# Patient Record
Sex: FEMALE | Race: ASIAN | NOT HISPANIC OR LATINO
[De-identification: names, ages, dates, MRNs, and addresses within clinical notes are randomized per-mention and may not be internally consistent; named-entity substitution may affect disease eponyms.]

---

## 2019-09-23 ENCOUNTER — RESULT REVIEW (OUTPATIENT)
Age: 64
End: 2019-09-23

## 2019-11-14 ENCOUNTER — INPATIENT (INPATIENT)
Facility: HOSPITAL | Age: 64
LOS: 2 days | Discharge: HOME | End: 2019-11-17
Attending: INTERNAL MEDICINE | Admitting: INTERNAL MEDICINE
Payer: COMMERCIAL

## 2019-11-14 VITALS
TEMPERATURE: 98 F | RESPIRATION RATE: 16 BRPM | HEART RATE: 90 BPM | OXYGEN SATURATION: 100 % | DIASTOLIC BLOOD PRESSURE: 107 MMHG | SYSTOLIC BLOOD PRESSURE: 184 MMHG | WEIGHT: 154.32 LBS

## 2019-11-14 DIAGNOSIS — R07.9 CHEST PAIN, UNSPECIFIED: ICD-10-CM

## 2019-11-14 DIAGNOSIS — E11.69 TYPE 2 DIABETES MELLITUS WITH OTHER SPECIFIED COMPLICATION: ICD-10-CM

## 2019-11-14 DIAGNOSIS — I10 ESSENTIAL (PRIMARY) HYPERTENSION: ICD-10-CM

## 2019-11-14 DIAGNOSIS — I82.90 ACUTE EMBOLISM AND THROMBOSIS OF UNSPECIFIED VEIN: ICD-10-CM

## 2019-11-14 DIAGNOSIS — J45.909 UNSPECIFIED ASTHMA, UNCOMPLICATED: ICD-10-CM

## 2019-11-14 DIAGNOSIS — I21.3 ST ELEVATION (STEMI) MYOCARDIAL INFARCTION OF UNSPECIFIED SITE: ICD-10-CM

## 2019-11-14 DIAGNOSIS — Z90.49 ACQUIRED ABSENCE OF OTHER SPECIFIED PARTS OF DIGESTIVE TRACT: Chronic | ICD-10-CM

## 2019-11-14 DIAGNOSIS — E78.5 HYPERLIPIDEMIA, UNSPECIFIED: ICD-10-CM

## 2019-11-14 LAB
ALBUMIN SERPL ELPH-MCNC: 4.4 G/DL — SIGNIFICANT CHANGE UP (ref 3.3–5)
ALP SERPL-CCNC: 72 U/L — SIGNIFICANT CHANGE UP (ref 40–120)
ALT FLD-CCNC: 26 U/L — SIGNIFICANT CHANGE UP (ref 4–33)
ANION GAP SERPL CALC-SCNC: 19 MMO/L — HIGH (ref 7–14)
APTT BLD: 32.2 SEC — SIGNIFICANT CHANGE UP (ref 27.5–36.3)
AST SERPL-CCNC: 30 U/L — SIGNIFICANT CHANGE UP (ref 4–32)
BASOPHILS # BLD AUTO: 0.05 K/UL — SIGNIFICANT CHANGE UP (ref 0–0.2)
BASOPHILS NFR BLD AUTO: 0.4 % — SIGNIFICANT CHANGE UP (ref 0–2)
BILIRUB SERPL-MCNC: 0.5 MG/DL — SIGNIFICANT CHANGE UP (ref 0.2–1.2)
BUN SERPL-MCNC: 16 MG/DL — SIGNIFICANT CHANGE UP (ref 7–23)
CALCIUM SERPL-MCNC: 10.5 MG/DL — SIGNIFICANT CHANGE UP (ref 8.4–10.5)
CHLORIDE SERPL-SCNC: 95 MMOL/L — LOW (ref 98–107)
CK MB BLD-MCNC: 2.9 — HIGH (ref 0–2.5)
CK MB BLD-MCNC: 7.57 NG/ML — HIGH (ref 1–4.7)
CK SERPL-CCNC: 262 U/L — HIGH (ref 25–170)
CO2 SERPL-SCNC: 23 MMOL/L — SIGNIFICANT CHANGE UP (ref 22–31)
CREAT SERPL-MCNC: 0.66 MG/DL — SIGNIFICANT CHANGE UP (ref 0.5–1.3)
EOSINOPHIL # BLD AUTO: 0.24 K/UL — SIGNIFICANT CHANGE UP (ref 0–0.5)
EOSINOPHIL NFR BLD AUTO: 1.8 % — SIGNIFICANT CHANGE UP (ref 0–6)
GLUCOSE BLDC GLUCOMTR-MCNC: 136 MG/DL — HIGH (ref 70–99)
GLUCOSE SERPL-MCNC: 234 MG/DL — HIGH (ref 70–99)
HCT VFR BLD CALC: 49.4 % — HIGH (ref 34.5–45)
HGB BLD-MCNC: 16.8 G/DL — HIGH (ref 11.5–15.5)
IMM GRANULOCYTES NFR BLD AUTO: 0.3 % — SIGNIFICANT CHANGE UP (ref 0–1.5)
INR BLD: 0.97 — SIGNIFICANT CHANGE UP (ref 0.88–1.17)
LYMPHOCYTES # BLD AUTO: 18.2 % — SIGNIFICANT CHANGE UP (ref 13–44)
LYMPHOCYTES # BLD AUTO: 2.4 K/UL — SIGNIFICANT CHANGE UP (ref 1–3.3)
MAGNESIUM SERPL-MCNC: 1.2 MG/DL — LOW (ref 1.6–2.6)
MCHC RBC-ENTMCNC: 29.3 PG — SIGNIFICANT CHANGE UP (ref 27–34)
MCHC RBC-ENTMCNC: 34 % — SIGNIFICANT CHANGE UP (ref 32–36)
MCV RBC AUTO: 86.1 FL — SIGNIFICANT CHANGE UP (ref 80–100)
MONOCYTES # BLD AUTO: 1 K/UL — HIGH (ref 0–0.9)
MONOCYTES NFR BLD AUTO: 7.6 % — SIGNIFICANT CHANGE UP (ref 2–14)
NEUTROPHILS # BLD AUTO: 9.43 K/UL — HIGH (ref 1.8–7.4)
NEUTROPHILS NFR BLD AUTO: 71.7 % — SIGNIFICANT CHANGE UP (ref 43–77)
NRBC # FLD: 0 K/UL — SIGNIFICANT CHANGE UP (ref 0–0)
PHOSPHATE SERPL-MCNC: 3.8 MG/DL — SIGNIFICANT CHANGE UP (ref 2.5–4.5)
PLATELET # BLD AUTO: 290 K/UL — SIGNIFICANT CHANGE UP (ref 150–400)
PMV BLD: 11.9 FL — SIGNIFICANT CHANGE UP (ref 7–13)
POTASSIUM SERPL-MCNC: 3.7 MMOL/L — SIGNIFICANT CHANGE UP (ref 3.5–5.3)
POTASSIUM SERPL-SCNC: 3.7 MMOL/L — SIGNIFICANT CHANGE UP (ref 3.5–5.3)
PROT SERPL-MCNC: 8.2 G/DL — SIGNIFICANT CHANGE UP (ref 6–8.3)
PROTHROM AB SERPL-ACNC: 11.1 SEC — SIGNIFICANT CHANGE UP (ref 9.8–13.1)
RBC # BLD: 5.74 M/UL — HIGH (ref 3.8–5.2)
RBC # FLD: 13.5 % — SIGNIFICANT CHANGE UP (ref 10.3–14.5)
SODIUM SERPL-SCNC: 137 MMOL/L — SIGNIFICANT CHANGE UP (ref 135–145)
TROPONIN T, HIGH SENSITIVITY: 39 NG/L — SIGNIFICANT CHANGE UP (ref ?–14)
TSH SERPL-MCNC: 1.98 UIU/ML — SIGNIFICANT CHANGE UP (ref 0.27–4.2)
WBC # BLD: 13.16 K/UL — HIGH (ref 3.8–10.5)
WBC # FLD AUTO: 13.16 K/UL — HIGH (ref 3.8–10.5)

## 2019-11-14 PROCEDURE — 93010 ELECTROCARDIOGRAM REPORT: CPT

## 2019-11-14 RX ORDER — INSULIN LISPRO 100/ML
VIAL (ML) SUBCUTANEOUS
Refills: 0 | Status: DISCONTINUED | OUTPATIENT
Start: 2019-11-14 | End: 2019-11-17

## 2019-11-14 RX ORDER — DEXTROSE 50 % IN WATER 50 %
12.5 SYRINGE (ML) INTRAVENOUS ONCE
Refills: 0 | Status: DISCONTINUED | OUTPATIENT
Start: 2019-11-14 | End: 2019-11-17

## 2019-11-14 RX ORDER — SODIUM CHLORIDE 9 MG/ML
1000 INJECTION, SOLUTION INTRAVENOUS
Refills: 0 | Status: DISCONTINUED | OUTPATIENT
Start: 2019-11-14 | End: 2019-11-17

## 2019-11-14 RX ORDER — MAGNESIUM SULFATE 500 MG/ML
2 VIAL (ML) INJECTION ONCE
Refills: 0 | Status: COMPLETED | OUTPATIENT
Start: 2019-11-14 | End: 2019-11-14

## 2019-11-14 RX ORDER — HEPARIN SODIUM 5000 [USP'U]/ML
4000 INJECTION INTRAVENOUS; SUBCUTANEOUS EVERY 6 HOURS
Refills: 0 | Status: DISCONTINUED | OUTPATIENT
Start: 2019-11-14 | End: 2019-11-14

## 2019-11-14 RX ORDER — TICAGRELOR 90 MG/1
90 TABLET ORAL EVERY 12 HOURS
Refills: 0 | Status: DISCONTINUED | OUTPATIENT
Start: 2019-11-15 | End: 2019-11-17

## 2019-11-14 RX ORDER — DEXTROSE 50 % IN WATER 50 %
25 SYRINGE (ML) INTRAVENOUS ONCE
Refills: 0 | Status: DISCONTINUED | OUTPATIENT
Start: 2019-11-14 | End: 2019-11-17

## 2019-11-14 RX ORDER — ASPIRIN/CALCIUM CARB/MAGNESIUM 324 MG
324 TABLET ORAL ONCE
Refills: 0 | Status: DISCONTINUED | OUTPATIENT
Start: 2019-11-14 | End: 2019-11-14

## 2019-11-14 RX ORDER — INSULIN LISPRO 100/ML
VIAL (ML) SUBCUTANEOUS AT BEDTIME
Refills: 0 | Status: DISCONTINUED | OUTPATIENT
Start: 2019-11-14 | End: 2019-11-17

## 2019-11-14 RX ORDER — INSULIN GLARGINE 100 [IU]/ML
15 INJECTION, SOLUTION SUBCUTANEOUS AT BEDTIME
Refills: 0 | Status: DISCONTINUED | OUTPATIENT
Start: 2019-11-15 | End: 2019-11-17

## 2019-11-14 RX ORDER — HEPARIN SODIUM 5000 [USP'U]/ML
4000 INJECTION INTRAVENOUS; SUBCUTANEOUS ONCE
Refills: 0 | Status: COMPLETED | OUTPATIENT
Start: 2019-11-14 | End: 2019-11-14

## 2019-11-14 RX ORDER — TICAGRELOR 90 MG/1
180 TABLET ORAL ONCE
Refills: 0 | Status: COMPLETED | OUTPATIENT
Start: 2019-11-14 | End: 2019-11-14

## 2019-11-14 RX ORDER — ASPIRIN/CALCIUM CARB/MAGNESIUM 324 MG
81 TABLET ORAL DAILY
Refills: 0 | Status: DISCONTINUED | OUTPATIENT
Start: 2019-11-15 | End: 2019-11-17

## 2019-11-14 RX ORDER — ATORVASTATIN CALCIUM 80 MG/1
80 TABLET, FILM COATED ORAL AT BEDTIME
Refills: 0 | Status: DISCONTINUED | OUTPATIENT
Start: 2019-11-14 | End: 2019-11-17

## 2019-11-14 RX ORDER — POTASSIUM CHLORIDE 20 MEQ
40 PACKET (EA) ORAL ONCE
Refills: 0 | Status: COMPLETED | OUTPATIENT
Start: 2019-11-14 | End: 2019-11-14

## 2019-11-14 RX ORDER — GLUCAGON INJECTION, SOLUTION 0.5 MG/.1ML
1 INJECTION, SOLUTION SUBCUTANEOUS ONCE
Refills: 0 | Status: DISCONTINUED | OUTPATIENT
Start: 2019-11-14 | End: 2019-11-17

## 2019-11-14 RX ORDER — HEPARIN SODIUM 5000 [USP'U]/ML
INJECTION INTRAVENOUS; SUBCUTANEOUS
Qty: 25000 | Refills: 0 | Status: DISCONTINUED | OUTPATIENT
Start: 2019-11-14 | End: 2019-11-14

## 2019-11-14 RX ORDER — DEXTROSE 50 % IN WATER 50 %
15 SYRINGE (ML) INTRAVENOUS ONCE
Refills: 0 | Status: DISCONTINUED | OUTPATIENT
Start: 2019-11-14 | End: 2019-11-17

## 2019-11-14 RX ORDER — INSULIN GLARGINE 100 [IU]/ML
7 INJECTION, SOLUTION SUBCUTANEOUS ONCE
Refills: 0 | Status: COMPLETED | OUTPATIENT
Start: 2019-11-14 | End: 2019-11-14

## 2019-11-14 RX ORDER — CHLORHEXIDINE GLUCONATE 213 G/1000ML
1 SOLUTION TOPICAL
Refills: 0 | Status: DISCONTINUED | OUTPATIENT
Start: 2019-11-14 | End: 2019-11-17

## 2019-11-14 RX ORDER — ASPIRIN/CALCIUM CARB/MAGNESIUM 324 MG
162 TABLET ORAL ONCE
Refills: 0 | Status: COMPLETED | OUTPATIENT
Start: 2019-11-14 | End: 2019-11-14

## 2019-11-14 RX ORDER — NITROGLYCERIN 6.5 MG
0.4 CAPSULE, EXTENDED RELEASE ORAL ONCE
Refills: 0 | Status: COMPLETED | OUTPATIENT
Start: 2019-11-14 | End: 2019-11-14

## 2019-11-14 RX ADMIN — INSULIN GLARGINE 7 UNIT(S): 100 INJECTION, SOLUTION SUBCUTANEOUS at 22:51

## 2019-11-14 RX ADMIN — HEPARIN SODIUM 800 UNIT(S)/HR: 5000 INJECTION INTRAVENOUS; SUBCUTANEOUS at 19:08

## 2019-11-14 RX ADMIN — ATORVASTATIN CALCIUM 80 MILLIGRAM(S): 80 TABLET, FILM COATED ORAL at 22:12

## 2019-11-14 RX ADMIN — Medication 40 MILLIEQUIVALENT(S): at 22:12

## 2019-11-14 RX ADMIN — Medication 50 GRAM(S): at 22:12

## 2019-11-14 RX ADMIN — Medication 0.4 MILLIGRAM(S): at 18:11

## 2019-11-14 RX ADMIN — TICAGRELOR 180 MILLIGRAM(S): 90 TABLET ORAL at 19:06

## 2019-11-14 RX ADMIN — Medication 162 MILLIGRAM(S): at 18:20

## 2019-11-14 RX ADMIN — HEPARIN SODIUM 4000 UNIT(S): 5000 INJECTION INTRAVENOUS; SUBCUTANEOUS at 19:07

## 2019-11-14 NOTE — CONSULT NOTE ADULT - SUBJECTIVE AND OBJECTIVE BOX
HPI: 65 y/o F w/ PMH of HTN, HLD, IDDM2 c/o CP x 6 hours. Pt states that she was in her USOH when she was woken up from sleep w/ severe CP. CP is substernal and described as severe, 8/10 squeezing pain. No radiation. No associated dyspnea. No associated nausea. No diaphoresis. She attempted to wait out her sxs but somebody called 911 on her behalf. She denies any hx of CAD. On arrival, given ASA and nitro SL x 3; she reports she is w/o CP at time of my interview.    PMH: As above  Meds: lisinopril, amlodipine, metformin, insulin  All: NKDA  Fam: No hx of premature CAD    ROS: 10 organ ROS is unremarkable.    Phyx exam:  /107   Gen: NAD.  HEENT: NCAT  CV: S1, S2 RRR. No MRG.  Chest: CTAB. No WRR.  Abd: +BSx4. Soft. NTND.  Ext: No edema  Skin: No rash.    No labs  No imaging    ECG w/ SARA in inf leads, V3-V6. Possible R wave and ST depression in V1, V2.

## 2019-11-14 NOTE — H&P ADULT - ASSESSMENT
65 y/o F w/ PMH of HTN, HLD, IDDM2 c/o CP x 6 hours. Pt states that she was in her USOH when she was woken up from sleep w/ severe CP w/ ECG w/ STEMI criteria.

## 2019-11-14 NOTE — ED PROVIDER NOTE - OBJECTIVE STATEMENT
64 Y F with hx of asthma, DM, HTN, HLD, no hx of smoking, here with CP that started today while at rest. Pt says that she has never had this before, she denies prior cardiac hx. She says when the pain started she was very fixated on stress at her job. She denies nausea, vomiting, diaphoresis, LOC. 64 Y F with hx of asthma, DM, HTN, HLD, no hx of smoking, here with pressure like CP that started today while at rest at 12pm (6hrs ago). Pt says that she has never had this before, she denies prior cardiac hx. She says when the pain started she was very fixated on stress at her job. She denies nausea, vomiting, diaphoresis, LOC.  Pain has been constant.  EMS gave her 2 sprays NTG and also 162 ASA.  States pain is much improved after sprays, but still present.  Denies SOB though appears SOB. States she is at her baseline resp status and attributes mild SOB to asthma. No recent travel, known sick contacts. No orthopnea.  No recent travel, trauma, or surgeries.

## 2019-11-14 NOTE — ED ADULT NURSE NOTE - OBJECTIVE STATEMENT
Pt. c/o non-radiating cp. Began at 12PM while lying down. Denies n/v, sob, palpitations, dizziness or fevers. Given 162 asa and 2 sublingual nitro in field. PIV inserted, EKG concerning for stemi-Cardiology called. CATH activated. Medicated as ordered.

## 2019-11-14 NOTE — ED ADULT TRIAGE NOTE - CHIEF COMPLAINT QUOTE
Pt. c/o non-radiating cp and diarrhea starting today. Denies n/v, sob, palpitations, dizziness or fevers. Pmhx: HTN, DM Pt. c/o non-radiating cp and diarrhea starting today. Denies n/v, sob, palpitations, dizziness or fevers. Given 162 asa and 2 sublingual nitro.

## 2019-11-14 NOTE — H&P ADULT - PROBLEM SELECTOR PLAN 4
Patient has h/o hyperlipidemia and is on   Lipid panel in am  Continue statin therapy with lipitor 80mg Lipid panel in am  Continue statin therapy with lipitor 80mg

## 2019-11-14 NOTE — H&P ADULT - NEUROLOGICAL DETAILS
sensation intact/responds to pain/deep reflexes intact/alert and oriented x 3/responds to verbal commands

## 2019-11-14 NOTE — H&P ADULT - NSICDXPASTMEDICALHX_GEN_ALL_CORE_FT
PAST MEDICAL HISTORY:  Asthma     DM (diabetes mellitus)     HLD (hyperlipidemia)     HTN (hypertension)

## 2019-11-14 NOTE — H&P ADULT - PROBLEM SELECTOR PLAN 2
-Has h/o diabetes and is on lantus  -Hold metformin  -Check HbA1c  -Monitor blood sugars ac and hs  -Lispro insulin sliding scale  -Lantus 15u at bedtime  -Low carbohydrate diet  -Diabetes education  -Endocrine consult if needed  -Nutrition consult if needed

## 2019-11-14 NOTE — H&P ADULT - GASTROINTESTINAL DETAILS
no bruit/no masses palpable/bowel sounds normal/normal/nontender/no guarding/no rebound tenderness/soft/no distention/no rigidity

## 2019-11-14 NOTE — ED PROVIDER NOTE - CLINICAL SUMMARY MEDICAL DECISION MAKING FREE TEXT BOX
Pt p/w pressure like substernal chest pain x 6 hrs.  Constant, non radiating, no assoc sx.  h/o DM, HTN, HLD, Asthma.  No known cardiac hx.  BP very elevated in ED and chest pain somewhat improved c NTG.  2nd ECG c dynamic changes and concerning for STEMI by Sgarbossa. Will activate STEMI c/s, provide additional ASA, and reassess.

## 2019-11-14 NOTE — H&P ADULT - HISTORY OF PRESENT ILLNESS
This is a 64yoF w/ PMHx HTN, HLD, DMII presents with severe substernal chest pain with no associated symptoms.  Was given ASA and nitro sublingual with resolution of chest pain.  Upon arrival to ED, EKG concerning for STEMI criteria and was emergently taken the cardiac cath lab.  Found to have 99% OM1 and 60% Cx s/p 2 MAURO.  Admitted to CCU for further management.

## 2019-11-14 NOTE — CONSULT NOTE ADULT - ASSESSMENT
63 y/o F w/ PMH of HTN, HLD, IDDM2 c/o CP x 6 hours. Pt states that she was in her USOH when she was woken up from sleep w/ severe CP w/ ECG w/ STEMI criteria.    #STEMI  -S/p   -S/p ticagrelor  -C/w ASA/ticagrelor for maintenance  -S/p IV heparin; c/w gtt  -Start high intensity statin  -Check A1c, lipids  -Check TTE  -For emergent LHC now    #Pt will CCU bed after LHC  #Call w/ any Qs    Erlin Elliott MD  Cardiology Fellow  [phone]  All Cardiology service information can be found 24/7 on [email or web address], password: [email or web address]

## 2019-11-14 NOTE — H&P ADULT - PROBLEM SELECTOR PROBLEM 5
Asthma, unspecified asthma severity, unspecified whether complicated, unspecified whether persistent VTE (venous thromboembolism)

## 2019-11-14 NOTE — H&P ADULT - MUSCULOSKELETAL
details… detailed exam ROM intact/normal/no joint swelling/no joint erythema/no joint warmth/no calf tenderness/normal strength

## 2019-11-14 NOTE — H&P ADULT - PROBLEM SELECTOR PLAN 1
-S/p ASA 32, ticagrelor 180mg and heparin gtt load  -s/p emergent LHC  -C/w ASA/ticagrelor for maintenance  -Start lipitor 80mg  -Check A1c, lipids  -Check TTE -S/p ASA 325mg, ticagrelor 180mg and heparin gtt load  -s/p emergent LHC  -C/w ASA/ticagrelor for maintenance  -Start lipitor 80mg  -Check A1c, lipids  -Check TTE

## 2019-11-14 NOTE — H&P ADULT - RS GEN PE MLT RESP DETAILS PC
airway patent/normal/no rhonchi/breath sounds equal/respirations non-labored/good air movement/no chest wall tenderness/clear to auscultation bilaterally/no intercostal retractions/no wheezes/no subcutaneous emphysema/no rales

## 2019-11-14 NOTE — ED ADULT NURSE NOTE - CHIEF COMPLAINT QUOTE
Pt. c/o non-radiating cp and diarrhea starting today. Denies n/v, sob, palpitations, dizziness or fevers. Given 162 asa and 2 sublingual nitro.

## 2019-11-14 NOTE — H&P ADULT - NSHPLABSRESULTS_GEN_ALL_CORE
LABORATORY VALUES	 	                          16.8   13.16 )-----------( 290      ( 14 Nov 2019 18:00 )             49.4       11-14    137  |  95<L>  |  16  ----------------------------<  234<H>  3.7   |  23  |  0.66    Ca    10.5      14 Nov 2019 18:00  Phos  3.8     11-14  Mg     1.2     11-14    TPro  8.2  /  Alb  4.4  /  TBili  0.5  /  DBili  x   /  AST  30  /  ALT  26  /  AlkPhos  72  11-14    LIVER FUNCTIONS - ( 14 Nov 2019 18:00 )  Alb: 4.4 g/dL / Pro: 8.2 g/dL / ALK PHOS: 72 u/L / ALT: 26 u/L / AST: 30 u/L / GGT: x           Prothrombin Time, Plasma: 11.1 SEC (11-14 @ 18:00)      CARDIAC MARKERS:  Creatine Kinase, Serum: 262 u/L (11-14 @ 18:00)    CKMB: 7.57 ng/mL (11-14 @ 18:00)    CKMB Relative Index: 2.9 (11-14 @ 18:00)        Troponin T, High Sensitivity: 39 ng/L (11-14-19 @ 18:00)            Thyroid Stimulating Hormone, Serum: 1.98 uIU/mL (11-14 @ 18:00)        CAPILLARY BLOOD GLUCOSE      POCT Blood Glucose.: 136 mg/dL (14 Nov 2019 22:08) LABORATORY VALUES	 	                          16.8   13.16 )-----------( 290      ( 14 Nov 2019 18:00 )             49.4       11-14    137  |  95<L>  |  16  ----------------------------<  234<H>  3.7   |  23  |  0.66    Ca    10.5      14 Nov 2019 18:00  Phos  3.8     11-14  Mg     1.2     11-14    TPro  8.2  /  Alb  4.4  /  TBili  0.5  /  DBili  x   /  AST  30  /  ALT  26  /  AlkPhos  72  11-14    LIVER FUNCTIONS - ( 14 Nov 2019 18:00 )  Alb: 4.4 g/dL / Pro: 8.2 g/dL / ALK PHOS: 72 u/L / ALT: 26 u/L / AST: 30 u/L / GGT: x           Prothrombin Time, Plasma: 11.1 SEC (11-14 @ 18:00)      CARDIAC MARKERS:  Creatine Kinase, Serum: 262 u/L (11-14 @ 18:00)    CKMB: 7.57 ng/mL (11-14 @ 18:00)    CKMB Relative Index: 2.9 (11-14 @ 18:00)    Troponin T, High Sensitivity: 39 ng/L (11-14-19 @ 18:00)    Thyroid Stimulating Hormone, Serum: 1.98 uIU/mL (11-14 @ 18:00)      CAPILLARY BLOOD GLUCOSE  POCT Blood Glucose.: 136 mg/dL (14 Nov 2019 22:08)

## 2019-11-14 NOTE — H&P ADULT - PROBLEM SELECTOR PLAN 3
Patient with h/o hypertension and is on   Continue home medications on enalapril and amlodipine  DASH diet  Monitor blood pressure  would hold ACE at this time given recent dye load  dc norvas  would start patient on metoprolol 12.5mg BID

## 2019-11-14 NOTE — ED PROVIDER NOTE - ATTENDING CONTRIBUTION TO CARE
Attending Attestation: Dr. White  I have personally performed a history and physical examination of the patient and discussed management with the resident as well as the patient.  I reviewed the resident's note and agree with the documented findings and plan of care.  I have authored and modified critical sections of the Provider Note, including but not limited to HPI, Physical Exam and MDM. Pt p/w pressure like substernal chest pain x 6 hrs.  Constant, non radiating, no assoc sx.  h/o DM, HTN, HLD, Asthma.  No known cardiac hx.  BP very elevated in ED and chest pain somewhat improved c NTG.  2nd ECG c dynamic changes and concerning for STEMI by Sgarbossa. Will activate STEMI c/s, provide additional ASA, and reassess.

## 2019-11-15 ENCOUNTER — TRANSCRIPTION ENCOUNTER (OUTPATIENT)
Age: 64
End: 2019-11-15

## 2019-11-15 DIAGNOSIS — Z29.9 ENCOUNTER FOR PROPHYLACTIC MEASURES, UNSPECIFIED: ICD-10-CM

## 2019-11-15 LAB
ALBUMIN SERPL ELPH-MCNC: 3.6 G/DL — SIGNIFICANT CHANGE UP (ref 3.3–5)
ALBUMIN SERPL ELPH-MCNC: 3.7 G/DL — SIGNIFICANT CHANGE UP (ref 3.3–5)
ALP SERPL-CCNC: 58 U/L — SIGNIFICANT CHANGE UP (ref 40–120)
ALP SERPL-CCNC: 59 U/L — SIGNIFICANT CHANGE UP (ref 40–120)
ALT FLD-CCNC: 37 U/L — HIGH (ref 4–33)
ALT FLD-CCNC: 48 U/L — HIGH (ref 4–33)
ANION GAP SERPL CALC-SCNC: 12 MMO/L — SIGNIFICANT CHANGE UP (ref 7–14)
ANION GAP SERPL CALC-SCNC: 15 MMO/L — HIGH (ref 7–14)
AST SERPL-CCNC: 177 U/L — HIGH (ref 4–32)
AST SERPL-CCNC: 288 U/L — HIGH (ref 4–32)
BILIRUB SERPL-MCNC: 0.6 MG/DL — SIGNIFICANT CHANGE UP (ref 0.2–1.2)
BILIRUB SERPL-MCNC: 0.8 MG/DL — SIGNIFICANT CHANGE UP (ref 0.2–1.2)
BUN SERPL-MCNC: 15 MG/DL — SIGNIFICANT CHANGE UP (ref 7–23)
BUN SERPL-MCNC: 16 MG/DL — SIGNIFICANT CHANGE UP (ref 7–23)
CALCIUM SERPL-MCNC: 9.5 MG/DL — SIGNIFICANT CHANGE UP (ref 8.4–10.5)
CALCIUM SERPL-MCNC: 9.5 MG/DL — SIGNIFICANT CHANGE UP (ref 8.4–10.5)
CHLORIDE SERPL-SCNC: 97 MMOL/L — LOW (ref 98–107)
CHLORIDE SERPL-SCNC: 99 MMOL/L — SIGNIFICANT CHANGE UP (ref 98–107)
CHOLEST SERPL-MCNC: 101 MG/DL — LOW (ref 120–199)
CHOLEST SERPL-MCNC: 105 MG/DL — LOW (ref 120–199)
CK MB BLD-MCNC: 13.3 — HIGH (ref 0–2.5)
CK MB BLD-MCNC: 148.5 NG/ML — HIGH (ref 1–4.7)
CK MB BLD-MCNC: 309.8 NG/ML — HIGH (ref 1–4.7)
CK MB BLD-MCNC: 353.2 NG/ML — HIGH (ref 1–4.7)
CK MB BLD-MCNC: 8.7 — HIGH (ref 0–2.5)
CK SERPL-CCNC: 1705 U/L — HIGH (ref 25–170)
CK SERPL-CCNC: 2086 U/L — HIGH (ref 25–170)
CK SERPL-CCNC: 2648 U/L — HIGH (ref 25–170)
CO2 SERPL-SCNC: 22 MMOL/L — SIGNIFICANT CHANGE UP (ref 22–31)
CO2 SERPL-SCNC: 26 MMOL/L — SIGNIFICANT CHANGE UP (ref 22–31)
CREAT SERPL-MCNC: 0.75 MG/DL — SIGNIFICANT CHANGE UP (ref 0.5–1.3)
CREAT SERPL-MCNC: 0.78 MG/DL — SIGNIFICANT CHANGE UP (ref 0.5–1.3)
GLUCOSE BLDC GLUCOMTR-MCNC: 126 MG/DL — HIGH (ref 70–99)
GLUCOSE BLDC GLUCOMTR-MCNC: 140 MG/DL — HIGH (ref 70–99)
GLUCOSE BLDC GLUCOMTR-MCNC: 143 MG/DL — HIGH (ref 70–99)
GLUCOSE BLDC GLUCOMTR-MCNC: 173 MG/DL — HIGH (ref 70–99)
GLUCOSE SERPL-MCNC: 148 MG/DL — HIGH (ref 70–99)
GLUCOSE SERPL-MCNC: 179 MG/DL — HIGH (ref 70–99)
HBA1C BLD-MCNC: 8.6 % — HIGH (ref 4–5.6)
HBA1C BLD-MCNC: 8.8 % — HIGH (ref 4–5.6)
HCT VFR BLD CALC: 44.4 % — SIGNIFICANT CHANGE UP (ref 34.5–45)
HCT VFR BLD CALC: 45.2 % — HIGH (ref 34.5–45)
HCV AB S/CO SERPL IA: 0.14 S/CO — SIGNIFICANT CHANGE UP (ref 0–0.99)
HCV AB SERPL-IMP: SIGNIFICANT CHANGE UP
HDLC SERPL-MCNC: 32 MG/DL — LOW (ref 45–65)
HDLC SERPL-MCNC: 34 MG/DL — LOW (ref 45–65)
HGB BLD-MCNC: 14.8 G/DL — SIGNIFICANT CHANGE UP (ref 11.5–15.5)
HGB BLD-MCNC: 15.1 G/DL — SIGNIFICANT CHANGE UP (ref 11.5–15.5)
LIPID PNL WITH DIRECT LDL SERPL: 55 MG/DL — SIGNIFICANT CHANGE UP
LIPID PNL WITH DIRECT LDL SERPL: 56 MG/DL — SIGNIFICANT CHANGE UP
MAGNESIUM SERPL-MCNC: 1.8 MG/DL — SIGNIFICANT CHANGE UP (ref 1.6–2.6)
MAGNESIUM SERPL-MCNC: 2.1 MG/DL — SIGNIFICANT CHANGE UP (ref 1.6–2.6)
MCHC RBC-ENTMCNC: 28.3 PG — SIGNIFICANT CHANGE UP (ref 27–34)
MCHC RBC-ENTMCNC: 28.3 PG — SIGNIFICANT CHANGE UP (ref 27–34)
MCHC RBC-ENTMCNC: 33.3 % — SIGNIFICANT CHANGE UP (ref 32–36)
MCHC RBC-ENTMCNC: 33.4 % — SIGNIFICANT CHANGE UP (ref 32–36)
MCV RBC AUTO: 84.8 FL — SIGNIFICANT CHANGE UP (ref 80–100)
MCV RBC AUTO: 84.9 FL — SIGNIFICANT CHANGE UP (ref 80–100)
NRBC # FLD: 0 K/UL — SIGNIFICANT CHANGE UP (ref 0–0)
NRBC # FLD: 0 K/UL — SIGNIFICANT CHANGE UP (ref 0–0)
PHOSPHATE SERPL-MCNC: 4 MG/DL — SIGNIFICANT CHANGE UP (ref 2.5–4.5)
PHOSPHATE SERPL-MCNC: 4.1 MG/DL — SIGNIFICANT CHANGE UP (ref 2.5–4.5)
PLATELET # BLD AUTO: 264 K/UL — SIGNIFICANT CHANGE UP (ref 150–400)
PLATELET # BLD AUTO: 265 K/UL — SIGNIFICANT CHANGE UP (ref 150–400)
PMV BLD: 11.6 FL — SIGNIFICANT CHANGE UP (ref 7–13)
PMV BLD: 11.8 FL — SIGNIFICANT CHANGE UP (ref 7–13)
POTASSIUM SERPL-MCNC: 3.5 MMOL/L — SIGNIFICANT CHANGE UP (ref 3.5–5.3)
POTASSIUM SERPL-MCNC: 4.3 MMOL/L — SIGNIFICANT CHANGE UP (ref 3.5–5.3)
POTASSIUM SERPL-SCNC: 3.5 MMOL/L — SIGNIFICANT CHANGE UP (ref 3.5–5.3)
POTASSIUM SERPL-SCNC: 4.3 MMOL/L — SIGNIFICANT CHANGE UP (ref 3.5–5.3)
PROT SERPL-MCNC: 6.8 G/DL — SIGNIFICANT CHANGE UP (ref 6–8.3)
PROT SERPL-MCNC: 7 G/DL — SIGNIFICANT CHANGE UP (ref 6–8.3)
RBC # BLD: 5.23 M/UL — HIGH (ref 3.8–5.2)
RBC # BLD: 5.33 M/UL — HIGH (ref 3.8–5.2)
RBC # FLD: 13.3 % — SIGNIFICANT CHANGE UP (ref 10.3–14.5)
RBC # FLD: 13.6 % — SIGNIFICANT CHANGE UP (ref 10.3–14.5)
SODIUM SERPL-SCNC: 134 MMOL/L — LOW (ref 135–145)
SODIUM SERPL-SCNC: 137 MMOL/L — SIGNIFICANT CHANGE UP (ref 135–145)
TRIGL SERPL-MCNC: 157 MG/DL — HIGH (ref 10–149)
TRIGL SERPL-MCNC: 97 MG/DL — SIGNIFICANT CHANGE UP (ref 10–149)
TROPONIN T, HIGH SENSITIVITY: 3299 NG/L — CRITICAL HIGH (ref ?–14)
TROPONIN T, HIGH SENSITIVITY: 6901 NG/L — CRITICAL HIGH (ref ?–14)
TSH SERPL-MCNC: 1.48 UIU/ML — SIGNIFICANT CHANGE UP (ref 0.27–4.2)
TSH SERPL-MCNC: 1.82 UIU/ML — SIGNIFICANT CHANGE UP (ref 0.27–4.2)
WBC # BLD: 10.85 K/UL — HIGH (ref 3.8–10.5)
WBC # BLD: 12.88 K/UL — HIGH (ref 3.8–10.5)
WBC # FLD AUTO: 10.85 K/UL — HIGH (ref 3.8–10.5)
WBC # FLD AUTO: 12.88 K/UL — HIGH (ref 3.8–10.5)

## 2019-11-15 PROCEDURE — 93306 TTE W/DOPPLER COMPLETE: CPT | Mod: 26

## 2019-11-15 RX ORDER — HEPARIN SODIUM 5000 [USP'U]/ML
5000 INJECTION INTRAVENOUS; SUBCUTANEOUS EVERY 12 HOURS
Refills: 0 | Status: DISCONTINUED | OUTPATIENT
Start: 2019-11-15 | End: 2019-11-17

## 2019-11-15 RX ORDER — POTASSIUM CHLORIDE 20 MEQ
40 PACKET (EA) ORAL ONCE
Refills: 0 | Status: COMPLETED | OUTPATIENT
Start: 2019-11-15 | End: 2019-11-15

## 2019-11-15 RX ORDER — MAGNESIUM SULFATE 500 MG/ML
2 VIAL (ML) INJECTION ONCE
Refills: 0 | Status: COMPLETED | OUTPATIENT
Start: 2019-11-15 | End: 2019-11-15

## 2019-11-15 RX ORDER — PANTOPRAZOLE SODIUM 20 MG/1
40 TABLET, DELAYED RELEASE ORAL
Refills: 0 | Status: DISCONTINUED | OUTPATIENT
Start: 2019-11-15 | End: 2019-11-17

## 2019-11-15 RX ORDER — FUROSEMIDE 40 MG
40 TABLET ORAL ONCE
Refills: 0 | Status: COMPLETED | OUTPATIENT
Start: 2019-11-15 | End: 2019-11-15

## 2019-11-15 RX ORDER — HYDROCHLOROTHIAZIDE 25 MG
25 TABLET ORAL DAILY
Refills: 0 | Status: DISCONTINUED | OUTPATIENT
Start: 2019-11-15 | End: 2019-11-17

## 2019-11-15 RX ORDER — MONTELUKAST 4 MG/1
10 TABLET, CHEWABLE ORAL DAILY
Refills: 0 | Status: DISCONTINUED | OUTPATIENT
Start: 2019-11-15 | End: 2019-11-17

## 2019-11-15 RX ORDER — LISINOPRIL 2.5 MG/1
5 TABLET ORAL DAILY
Refills: 0 | Status: DISCONTINUED | OUTPATIENT
Start: 2019-11-15 | End: 2019-11-17

## 2019-11-15 RX ADMIN — HEPARIN SODIUM 5000 UNIT(S): 5000 INJECTION INTRAVENOUS; SUBCUTANEOUS at 05:49

## 2019-11-15 RX ADMIN — Medication 40 MILLIEQUIVALENT(S): at 02:15

## 2019-11-15 RX ADMIN — MONTELUKAST 10 MILLIGRAM(S): 4 TABLET, CHEWABLE ORAL at 22:01

## 2019-11-15 RX ADMIN — CHLORHEXIDINE GLUCONATE 1 APPLICATION(S): 213 SOLUTION TOPICAL at 12:34

## 2019-11-15 RX ADMIN — Medication 50 GRAM(S): at 02:15

## 2019-11-15 RX ADMIN — ATORVASTATIN CALCIUM 80 MILLIGRAM(S): 80 TABLET, FILM COATED ORAL at 22:01

## 2019-11-15 RX ADMIN — PANTOPRAZOLE SODIUM 40 MILLIGRAM(S): 20 TABLET, DELAYED RELEASE ORAL at 05:49

## 2019-11-15 RX ADMIN — INSULIN GLARGINE 15 UNIT(S): 100 INJECTION, SOLUTION SUBCUTANEOUS at 22:01

## 2019-11-15 RX ADMIN — Medication 1: at 16:53

## 2019-11-15 RX ADMIN — TICAGRELOR 90 MILLIGRAM(S): 90 TABLET ORAL at 05:49

## 2019-11-15 RX ADMIN — HEPARIN SODIUM 5000 UNIT(S): 5000 INJECTION INTRAVENOUS; SUBCUTANEOUS at 17:04

## 2019-11-15 RX ADMIN — Medication 81 MILLIGRAM(S): at 12:34

## 2019-11-15 RX ADMIN — Medication 40 MILLIGRAM(S): at 05:52

## 2019-11-15 RX ADMIN — TICAGRELOR 90 MILLIGRAM(S): 90 TABLET ORAL at 17:04

## 2019-11-15 NOTE — DISCHARGE NOTE PROVIDER - NSDCFUADDINST_GEN_ALL_CORE_FT
Follow up with your PCP & cardiology within 1-2 weeks; call for appointments.    Monitor cardiac catheterization site for signs of bleeding, increased bruising, swelling, or discharge. If you experience any of these symptoms, please follow up with your primary care physician or return to the hospital immediately. Do not submerge the site in water (bathe or swim). You may shower. No strenuous activity for 3 weeks. Do not drive for 48hrs following angiogram.

## 2019-11-15 NOTE — DISCHARGE NOTE PROVIDER - PROVIDER TOKENS
PROVIDER:[TOKEN:[[id number]]] PROVIDER:[TOKEN:[[id number]]],FREE:[LAST:[Correa],PHONE:[(   )    -],FAX:[(   )    -],ADDRESS:[PCP]]

## 2019-11-15 NOTE — DISCHARGE NOTE PROVIDER - NSDCACTIVITY_GEN_ALL_CORE
Walking - Outdoors allowed/Showering allowed/Stairs allowed/Walking - Indoors allowed/No restrictions/No heavy lifting/straining

## 2019-11-15 NOTE — CHART NOTE - NSCHARTNOTEFT_GEN_A_CORE
63yo F with PMH of HTN, HLD, asthma, DM2 presented with chest tightness 2/2 inferiorlateral STEMI. Patient woke up in the evening with chest tightness and R hand pain. No previous hx of MI, CVA, HF. EKG demonstrated ST elevation in II, III, avF, V3-V6. Cath showed pLCx 60% stentx1, pOM1 100% (MAURO) with EF 35% and EDP 25. Patient was started on ASA, Brilinta, and high intensity statin.     ICU Vital Signs Last 24 Hrs  T(C): 37.2 (15 Nov 2019 16:17), Max: 37.2 (15 Nov 2019 16:17)  T(F): 98.9 (15 Nov 2019 16:17), Max: 98.9 (15 Nov 2019 16:17)  HR: 94 (15 Nov 2019 17:00) (71 - 100)  BP: 141/80 (15 Nov 2019 17:00) (108/79 - 230/118)  BP(mean): 95 (15 Nov 2019 17:00) (73 - 136)  ABP: --  ABP(mean): --  RR: 15 (15 Nov 2019 17:00) (15 - 23)  SpO2: 99% (15 Nov 2019 17:00) (97% - 100%)    PHYSICAL EXAM  GENERAL: NAD, lying comfortably in bed   HEAD:  Atraumatic, Normocephalic  EYES: EOMI b/l, PERRLA b/l, conjunctiva and sclera clear  NECK: Supple, No JVD, No LAD   CHEST/LUNG: Clear to auscultation bilaterally; No wheeze or ronchi  HEART: Regular rate and rhythm; S1 and S2 present, No murmurs, rubs, or gallops  ABDOMEN: Soft, Nontender, Nondistended; Bowel sounds present  EXTREMITIES:  2+ Peripheral Pulses, No clubbing, cyanosis, or edema. R groin dressing CDI, no hematoma  NEURO: AAOx3, non-focal   SKIN: No rashes or lesions    63yo F with PMH of HTN, HLD, asthma, DM2 presented with chest tightness 2/2 inferiorlateral STEMI s/p L. cath pLCx 60% stentx1, pOM1 100% (MAURO) with EF 35% and EDP 25, on ASA, Brilinta, and atorvastatin.       # ST elevation myocardial infarction (STEMI), unspecified artery.   s/p L. cath pLCx 60% stentx1, pOM1 100% (MAURO) with EF 35% and EDP 25  cw asa, brinlita, atorvastatin  start metoprolol tomorrow 12.5mg BID  Echo: 40% EF with moderate LV systolic dysfunction. Inferior and vasal to mid inferolateral wall hypokinesis   trend cardiac enzymes.     #Type 2 diabetes mellitus with other specified complication, with long-term current use of insulin.   SSI and FS  A1c 8.6.     #Prophylactic measure.  Plan: DVT: Heparin SubQ  Diet: DASH.     OUTSTANDING ISSUES  [] start metoprolol 12.5mg BID tomorrow  [] confirm home insulin dose  [] may increase lisinopril if HTN  [] trend cardiac enzymes until downtrending q8  [] needs follow up with cardiologist 65yo F with PMH of HTN, HLD, asthma, DM2 presented with chest tightness 2/2 inferiorlateral STEMI. Patient woke up in the evening with chest tightness and R hand pain. No previous hx of MI, CVA, HF. EKG demonstrated ST elevation in II, III, avF, V3-V6. Cath showed pLCx 60% stentx1, pOM1 100% (MAURO) with EF 35% and EDP 25. Patient was started on ASA, Brilinta, and high intensity statin.     ICU Vital Signs Last 24 Hrs  T(C): 37.2 (15 Nov 2019 16:17), Max: 37.2 (15 Nov 2019 16:17)  T(F): 98.9 (15 Nov 2019 16:17), Max: 98.9 (15 Nov 2019 16:17)  HR: 94 (15 Nov 2019 17:00) (71 - 100)  BP: 141/80 (15 Nov 2019 17:00) (108/79 - 230/118)  BP(mean): 95 (15 Nov 2019 17:00) (73 - 136)  ABP: --  ABP(mean): --  RR: 15 (15 Nov 2019 17:00) (15 - 23)  SpO2: 99% (15 Nov 2019 17:00) (97% - 100%)    PHYSICAL EXAM  GENERAL: NAD, lying comfortably in bed   HEAD:  Atraumatic, Normocephalic  EYES: EOMI b/l, PERRLA b/l, conjunctiva and sclera clear  NECK: Supple, No JVD, No LAD   CHEST/LUNG: Clear to auscultation bilaterally; No wheeze or ronchi  HEART: Regular rate and rhythm; S1 and S2 present, No murmurs, rubs, or gallops  ABDOMEN: Soft, Nontender, Nondistended; Bowel sounds present  EXTREMITIES:  2+ Peripheral Pulses, No clubbing, cyanosis, or edema. R groin dressing CDI, no hematoma  NEURO: AAOx3, non-focal   SKIN: No rashes or lesions    65yo F with PMH of HTN, HLD, asthma, DM2 presented with chest tightness 2/2 inferiorlateral STEMI s/p L. cath pLCx 60% stentx1, pOM1 100% (MAURO) with EF 35% and EDP 25, on ASA, Brilinta, and atorvastatin.       # ST elevation myocardial infarction (STEMI), unspecified artery.   s/p L. cath pLCx 60% stentx1, pOM1 100% (MAURO) with EF 35% and EDP 25  cw asa, brinlita, atorvastatin  start metoprolol tomorrow 12.5mg BID  Echo: 40% EF with moderate LV systolic dysfunction. Inferior and vasal to mid inferolateral wall hypokinesis   trend cardiac enzymes.     #Type 2 diabetes mellitus with other specified complication, with long-term current use of insulin.   SSI and FS  A1c 8.6.     #Prophylactic measure.  Plan: DVT: Heparin SubQ  Diet: DASH.     OUTSTANDING ISSUES  [] start metoprolol 12.5mg BID tomorrow  [] confirm home insulin dose  [] may increase lisinopril if HTN  [] needs follow up with cardiologist

## 2019-11-15 NOTE — DISCHARGE NOTE PROVIDER - CARE PROVIDER_API CALL
Chloé Triplett)  Cardiovascular Disease  [facility], [address] [place] [address] [id number] [place] [address]  Phone: [phone]  Fax: [phone]  Follow Up Time: Chloé Triplett)  Cardiovascular Disease  [facility] [place] [facility], [address] [place] [address] [id number] [place] [address]  Phone: [phone]  Fax: [phone]  Follow Up Time: Chloé Triplett)  Cardiovascular Disease  [facility], [address] [place] [address] [address] [place] [address]  Phone: [phone]  Fax: [phone]  Follow Up Time:     PARVEEN Correa  Phone: (   )    -  Fax: (   )    -  Follow Up Time:

## 2019-11-15 NOTE — CHART NOTE - NSCHARTNOTEFT_GEN_A_CORE
Rt 6 Fr arterial sheath discontinued. Manual pressure applied for 18 mins and good hemostatis obtained. No hematoma or bleeding noted. Vital sign stable .Patient tolerated procedure well. OBDULIO Vickers instructed to monitor groin for bleeding or hematoma  .

## 2019-11-15 NOTE — DISCHARGE NOTE PROVIDER - CARE PROVIDERS DIRECT ADDRESSES
[email or web address]

## 2019-11-15 NOTE — PROGRESS NOTE ADULT - SUBJECTIVE AND OBJECTIVE BOX
CONTACT INFO:  Melissa Boucher MD  Internal Medicine PGY1  Pager NS: [phone]      HPI / INTERVAL HISTORY:  Patient without significant events overnight. Patient seen and examined this morning.     TELEMTRY:    EKG:    MEDICATIONS:  aspirin enteric coated 81 milliGRAM(s) Oral daily  atorvastatin 80 milliGRAM(s) Oral at bedtime  chlorhexidine 4% Liquid 1 Application(s) Topical <User Schedule>  dextrose 40% Gel 15 Gram(s) Oral once PRN  dextrose 5%. 1000 milliLiter(s) IV Continuous <Continuous>  dextrose 50% Injectable 12.5 Gram(s) IV Push once  dextrose 50% Injectable 25 Gram(s) IV Push once  dextrose 50% Injectable 25 Gram(s) IV Push once  glucagon  Injectable 1 milliGRAM(s) IntraMuscular once PRN  heparin  Injectable 5000 Unit(s) SubCutaneous every 12 hours  insulin glargine Injectable (LANTUS) 15 Unit(s) SubCutaneous at bedtime  insulin lispro (HumaLOG) corrective regimen sliding scale   SubCutaneous three times a day before meals  insulin lispro (HumaLOG) corrective regimen sliding scale   SubCutaneous at bedtime  montelukast 10 milliGRAM(s) Oral daily  pantoprazole    Tablet 40 milliGRAM(s) Oral before breakfast  ticagrelor 90 milliGRAM(s) Oral every 12 hours      ALLERGIES:  No Known Allergies      OBJECTIVE:  VITAL SIGNS:  ICU Vital Signs Last 24 Hrs  T(C): 36.9 (15 Nov 2019 07:36), Max: 36.9 (15 Nov 2019 07:36)  T(F): 98.4 (15 Nov 2019 07:36), Max: 98.4 (15 Nov 2019 07:36)  HR: 84 (15 Nov 2019 08:00) (71 - 90)  BP: 130/96 (15 Nov 2019 08:00) (109/64 - 230/118)  BP(mean): 104 (15 Nov 2019 08:00) (73 - 136)  ABP: --  ABP(mean): --  RR: 17 (15 Nov 2019 08:00) (15 - 21)  SpO2: 99% (15 Nov 2019 08:00) (97% - 100%)        11-14 @ 07:01  -  11-15 @ 07:00  --------------------------------------------------------  IN: 580 mL / OUT: 550 mL / NET: 30 mL    11-15 @ 07:01  -  11-15 @ 08:09  --------------------------------------------------------  IN: 240 mL / OUT: 450 mL / NET: -210 mL      CAPILLARY BLOOD GLUCOSE      POCT Blood Glucose.: 126 mg/dL (15 Nov 2019 07:42)      PHYSICAL EXAM:  Gen: NAD  HEENT: NC/AT; PERRL, anicteric sclera  Neck: supple, no JVD  Resp: clear to ausculation B/L; no wheezes, rales or rhonchi  Cardiovasc: S1, S2 normal; RRR; no murmurs, rubs or gallops  GI: soft, nondistended, nontender; +BS  Extr: warm, well-perfused, PT/DP pulses 2+ B/L; no LE edema  Skin: normal color and turgor  Neuro: no focal deficits     LABS:                        15.1   12.88 )-----------( 264      ( 15 Nov 2019 06:00 )             45.2     11-15    137  |  99  |  16  ----------------------------<  148<H>  4.3   |  26  |  0.75    Ca    9.5      15 Nov 2019 06:00  Phos  4.1     11-15  Mg     2.1     11-15    TPro  6.8  /  Alb  3.7  /  TBili  0.8  /  DBili  x   /  AST  288<H>  /  ALT  48<H>  /  AlkPhos  59  11-15    LIVER FUNCTIONS - ( 15 Nov 2019 06:00 )  Alb: 3.7 g/dL / Pro: 6.8 g/dL / ALK PHOS: 59 u/L / ALT: 48 u/L / AST: 288 u/L / GGT: x           PT/INR - ( 14 Nov 2019 18:00 )   PT: 11.1 SEC;   INR: 0.97          PTT - ( 14 Nov 2019 18:00 )  PTT:32.2 SEC    CARDIAC MARKERS ( 15 Nov 2019 06:00 )  x     / x     / 2648 u/L / 353.20 ng/mL / x      CARDIAC MARKERS ( 15 Nov 2019 00:46 )  x     / x     / 2086 u/L / 309.80 ng/mL / x      CARDIAC MARKERS ( 14 Nov 2019 18:00 )  x     / x     / 262 u/L / 7.57 ng/mL / x              RADIOLOGY & ADDITIONAL TESTS: CONTACT INFO:  Melissa Boucher MD  Internal Medicine PGY1  Pager NS: [phone]      HPI / INTERVAL HISTORY:  63yo F with PMH of HTN, HLD, asthma, DM2 presented with chest tightness 2/2 STEMI. Patient woke up in the evening with chest tightness and R hand pain. No previous hx of MI, CVA, HF. EKG demonstrated ST elevation in II, III, avF, V3-V6, interiorlateral. Cath showed pLCx 60% stentx1, pOM1 100% (MAURO) with EF 35% and EDP 25. Lasix given for SOB    Patient seen and examined this morning. She denies CP, palpiations, SOB, numbness and tingling. She is tolerating diet.     TELEMTERY: NSR 70s    EKG: NSR 81, ST depressions II, III, aVF, V4-6    MEDICATIONS:  aspirin enteric coated 81 milliGRAM(s) Oral daily  atorvastatin 80 milliGRAM(s) Oral at bedtime  chlorhexidine 4% Liquid 1 Application(s) Topical <User Schedule>  dextrose 40% Gel 15 Gram(s) Oral once PRN  dextrose 5%. 1000 milliLiter(s) IV Continuous <Continuous>  dextrose 50% Injectable 12.5 Gram(s) IV Push once  dextrose 50% Injectable 25 Gram(s) IV Push once  dextrose 50% Injectable 25 Gram(s) IV Push once  glucagon  Injectable 1 milliGRAM(s) IntraMuscular once PRN  heparin  Injectable 5000 Unit(s) SubCutaneous every 12 hours  insulin glargine Injectable (LANTUS) 15 Unit(s) SubCutaneous at bedtime  insulin lispro (HumaLOG) corrective regimen sliding scale   SubCutaneous three times a day before meals  insulin lispro (HumaLOG) corrective regimen sliding scale   SubCutaneous at bedtime  montelukast 10 milliGRAM(s) Oral daily  pantoprazole    Tablet 40 milliGRAM(s) Oral before breakfast  ticagrelor 90 milliGRAM(s) Oral every 12 hours      ALLERGIES:  No Known Allergies      OBJECTIVE:  VITAL SIGNS:  ICU Vital Signs Last 24 Hrs  T(C): 36.9 (15 Nov 2019 07:36), Max: 36.9 (15 Nov 2019 07:36)  T(F): 98.4 (15 Nov 2019 07:36), Max: 98.4 (15 Nov 2019 07:36)  HR: 84 (15 Nov 2019 08:00) (71 - 90)  BP: 130/96 (15 Nov 2019 08:00) (109/64 - 230/118)  BP(mean): 104 (15 Nov 2019 08:00) (73 - 136)  ABP: --  ABP(mean): --  RR: 17 (15 Nov 2019 08:00) (15 - 21)  SpO2: 99% (15 Nov 2019 08:00) (97% - 100%)        11-14 @ 07:01  -  11-15 @ 07:00  --------------------------------------------------------  IN: 580 mL / OUT: 550 mL / NET: 30 mL    11-15 @ 07:01  -  11-15 @ 08:09  --------------------------------------------------------  IN: 240 mL / OUT: 450 mL / NET: -210 mL      CAPILLARY BLOOD GLUCOSE      POCT Blood Glucose.: 126 mg/dL (15 Nov 2019 07:42)      PHYSICAL EXAM:  Gen: NAD  HEENT: NC/AT; PERRL, anicteric sclera  Neck: supple, no JVD  Resp: clear to ausculation B/L; no wheezes, rales or rhonchi  Cardiovasc: S1, S2 normal; RRR; no murmurs, rubs or gallops  GI: soft, nondistended, nontender; +BS  Extr: warm, well-perfused, PT/DP pulses 2+ B/L; no LE edema. Right groin dressing CDI, no hematoma  Skin: normal color and turgor  Neuro: no focal deficits     LABS:                        15.1   12.88 )-----------( 264      ( 15 Nov 2019 06:00 )             45.2     11-15    137  |  99  |  16  ----------------------------<  148<H>  4.3   |  26  |  0.75    Ca    9.5      15 Nov 2019 06:00  Phos  4.1     11-15  Mg     2.1     11-15    TPro  6.8  /  Alb  3.7  /  TBili  0.8  /  DBili  x   /  AST  288<H>  /  ALT  48<H>  /  AlkPhos  59  11-15    LIVER FUNCTIONS - ( 15 Nov 2019 06:00 )  Alb: 3.7 g/dL / Pro: 6.8 g/dL / ALK PHOS: 59 u/L / ALT: 48 u/L / AST: 288 u/L / GGT: x           PT/INR - ( 14 Nov 2019 18:00 )   PT: 11.1 SEC;   INR: 0.97          PTT - ( 14 Nov 2019 18:00 )  PTT:32.2 SEC    CARDIAC MARKERS ( 15 Nov 2019 06:00 )  x     / x     / 2648 u/L / 353.20 ng/mL / x      CARDIAC MARKERS ( 15 Nov 2019 00:46 )  x     / x     / 2086 u/L / 309.80 ng/mL / x      CARDIAC MARKERS ( 14 Nov 2019 18:00 )  x     / x     / 262 u/L / 7.57 ng/mL / x          < from: Cardiac Cath Lab - Adult (11.14.19 @ 18:59) >  CORONARY VESSELS: The coronary circulation is left dominant.  LM:   --  LM: Normal.  LAD:   --  LAD: Angiography showed minor luminal irregularities with no  flow limiting lesions.  CX:   --  Proximal circumflex: There was a discrete 60 % stenosis. There  was PEPPER grade 3 flow through the vessel (brisk flow).  --  OM1: There was a 100 % stenosis at the ostium of the vessel segment.  There was PEPPER grade 0 flow through the vessel (no flow).  RCA:   --  RCA: Normal.  COMPLICATIONS: There were no complications.  SUMMARY:  1ST LESION INTERVENTIONS: A successful thrombectomy with stent was  performed on the 100 % lesion in the 1st obtuse marginal. Following  intervention there was a 1 % residual stenosis.  2ND LESION INTERVENTIONS: A successful stent was performed on the 60 %  lesion in the proximal circumflex. Following intervention there was a 1 %  residual stenosis.  DIAGNOSTIC RECOMMENDATIONS: PCI of proximalOM1 for treatment of  inferolateral STEMI.  INTERVENTIONAL RECOMMENDATIONS: Add aspirin, 81 mg, PO, daily. Add  ticagrelor 90 mg twice daily. Patient management should include aggressive  medical therapy.    < end of copied text >

## 2019-11-15 NOTE — DISCHARGE NOTE PROVIDER - HOSPITAL COURSE
64F with HTN, HLD, DM presents with chest pain and + NSTEMI. She is now s/p MAURO X 1 to pLCX and pOM1. Patient with decreased LV function on LV gram (35%). CCU course has been uncomplicated, awaiting ECHO. 64F with HTN, HLD, DM presents with chest pain and STEMI. She is now s/p MAURO X 1 to pLCX and pOM1. Patient with decreased LV function on LV gram (35%). CCU course has been uncomplicated. Repeat Echo demonstrated EF 40% with moderate LV systolic dysfunction with inferior and basal to mid inferolateral wall hypokinesis. Patient on high dose stain, resumed home dose lisinopril and started on ASA with Brilinta. 64F with HTN, HLD, DM presents with chest pain and STEMI. She is now s/p MAURO X 1 to pLCX and pOM1. Patient with decreased LV function on LV gram (35%). CCU course has been uncomplicated. Repeat Echo demonstrated EF 40% with moderate LV systolic dysfunction with inferior and basal to mid inferolateral wall hypokinesis. Patient on high dose stain, resumed home dose lisinopril and started on ASA with Brilinta.  Started low dose BB.  Patient cleared for discharge home with outpatient f/u with PCP & cardiology.         11/15 Echo: 1. Normal left ventricular internal dimensions and wall thicknesses. 2. Endocardial visualization enhanced with intravenous injection of echo contrast (Definity). Moderate segmental left ventricular systolic dysfunction. Inferior and basal to mid inferolateral wall hypokinesis. 3. Normal right ventricular size and function.    11/14/19 Cardiac cath - EF 35%, EDP 25, pLCx 60%=> stent x 1, pOM1 100%=> stent x 1. RFA access. ASA 81mg + Brilinta 90 mg PO BID. 64F with HTN, HLD, DM presents with chest pain and STEMI. She is now s/p MAURO X 1 to pLCX and pOM1. Patient with decreased LV function on LV gram (35%). CCU course has been uncomplicated. Repeat Echo demonstrated EF 40% with moderate LV systolic dysfunction with inferior and basal to mid inferolateral wall hypokinesis. Patient on high dose stain, resumed home dose lisinopril and started on ASA with Brilinta.  Started low dose BB.  Brilinta noted to not be covered by patient's insurance on discharge.  As per cardiology, switch to plavix on discharge.  Give plavix 600mg po x 1 dose on 11/18/19 AM.  Then start plavix 75mg po qd on 11/19/19.  Patient cleared for discharge home with outpatient f/u with PCP & cardiology.         11/15 Echo: 1. Normal left ventricular internal dimensions and wall thicknesses. 2. Endocardial visualization enhanced with intravenous injection of echo contrast (Definity). Moderate segmental left ventricular systolic dysfunction. Inferior and basal to mid inferolateral wall hypokinesis. 3. Normal right ventricular size and function.    11/14/19 Cardiac cath - EF 35%, EDP 25, pLCx 60%=> stent x 1, pOM1 100%=> stent x 1. RFA access. 64F with HTN, HLD, DM presents with chest pain and STEMI. She is now s/p MAURO X 1 to pLCX and pOM1. Patient with decreased LV function on LV gram (35%). CCU course has been uncomplicated. Repeat Echo demonstrated EF 40% with moderate LV systolic dysfunction with inferior and basal to mid inferolateral wall hypokinesis. Patient on high dose stain, resumed home dose lisinopril and started on ASA with Brilinta.  Started low dose BB.  Brilinta noted to not be covered by patient's insurance on discharge.  As per cardiology fellow 11/17, switch to plavix on discharge.  Give plavix 600mg po x 1 dose on 11/18/19 AM.  Then start plavix 75mg po qd on 11/19/19.  Patient cleared for discharge home by cardiology 11/17 with outpatient f/u with PCP & cardiology.         11/15 Echo: 1. Normal left ventricular internal dimensions and wall thicknesses. 2. Endocardial visualization enhanced with intravenous injection of echo contrast (Definity). Moderate segmental left ventricular systolic dysfunction. Inferior and basal to mid inferolateral wall hypokinesis. 3. Normal right ventricular size and function.    11/14/19 Cardiac cath - EF 35%, EDP 25, pLCx 60%=> stent x 1, pOM1 100%=> stent x 1. RFA access. 64F with HTN, HLD, DM presents with chest pain and NSTEMI. She is now s/p MAURO X 1 to pLCX and pOM1. Patient with decreased LV function on LV gram (35%). CCU course has been uncomplicated. Repeat Echo demonstrated EF 40% with moderate LV systolic dysfunction with inferior and basal to mid inferolateral wall hypokinesis. Patient on high dose stain, resumed home dose lisinopril and started on ASA with Brilinta.  Started low dose BB.  Brilinta noted to not be covered by patient's insurance on discharge.  As per cardiology fellow 11/17, switch to plavix on discharge.  Give plavix 600mg po x 1 dose on 11/18/19 AM.  Then start plavix 75mg po qd on 11/19/19.  Patient cleared for discharge home by cardiology 11/17 with outpatient f/u with PCP & cardiology.         11/15 Echo: 1. Normal left ventricular internal dimensions and wall thicknesses. 2. Endocardial visualization enhanced with intravenous injection of echo contrast (Definity). Moderate segmental left ventricular systolic dysfunction. Inferior and basal to mid inferolateral wall hypokinesis. 3. Normal right ventricular size and function.    11/14/19 Cardiac cath - EF 35%, EDP 25, pLCx 60%=> stent x 1, pOM1 100%=> stent x 1. RFA access.

## 2019-11-15 NOTE — DISCHARGE NOTE PROVIDER - NSDCCPCAREPLAN_GEN_ALL_CORE_FT
PRINCIPAL DISCHARGE DIAGNOSIS  Diagnosis: Non-ST elevation MI (NSTEMI)  Assessment and Plan of Treatment: Continue aspirin and brilinta & do not stop unless instructed by your physician.  Continue low salt, fat, cholesterol and carbohydrate diet. Follow up with cardiologist and primary care physician's routine appointment.   Monitor cardiac catheterization site for signs of bleeding, increased bruising, swelling, or discharge. If you experience any of these symptoms, please follow up with your primary care physician or return to the hospital immediately. Do not submerge the site in water (bathe or swim). You may shower. No strenuous activity for 3 weeks. Do not drive for 48hrs following angiogram.      SECONDARY DISCHARGE DIAGNOSES  Diagnosis: Asthma  Assessment and Plan of Treatment: Continue medications as prescribed.  Follow up with your PCP.    Diagnosis: HTN (hypertension)  Assessment and Plan of Treatment: Low sodium and fat diet, continue anti-hypertensive medications, and follow up with primary care physician.    Diagnosis: HLD (hyperlipidemia)  Assessment and Plan of Treatment: Low fat diet, exercise daily and continue current medications. Follow up with primary care physician and cardiologist for management.    Diagnosis: DM (diabetes mellitus)  Assessment and Plan of Treatment: Monitor finger sticks pre-meal and bedtime, low salt, fat and carbohydrate diet, minimize glucose intake.  Exercise daily for at least 30 minutes and weight loss.  Follow up with primary care physician and endocrinologist for routine Hemoglobin A1C checks and management.  Follow up with your ophthalmologist for routine yearly vision exams. PRINCIPAL DISCHARGE DIAGNOSIS  Diagnosis: Non-ST elevation MI (NSTEMI)  Assessment and Plan of Treatment: Continue aspirin and plavix & do not stop unless instructed by your physician.    Take 600mg by mouth of plavix on 11/18/19 AM for one dose.  Then on 11/19/19, take plavix 75mg by mouth daily.  Continue low salt, fat, cholesterol and carbohydrate diet. Follow up with your PCP & cardiology within 1-2 weeks; call for appointments.   Monitor cardiac catheterization site for signs of bleeding, increased bruising, swelling, or discharge. If you experience any of these symptoms, please follow up with your primary care physician or return to the hospital immediately. Do not submerge the site in water (bathe or swim). You may shower. No strenuous activity for 3 weeks. Do not drive for 48hrs following angiogram.      SECONDARY DISCHARGE DIAGNOSES  Diagnosis: Asthma  Assessment and Plan of Treatment: Continue medications as prescribed.  Follow up with your PCP.    Diagnosis: HTN (hypertension)  Assessment and Plan of Treatment: Low sodium and fat diet, continue anti-hypertensive medications, and follow up with primary care physician.    Diagnosis: HLD (hyperlipidemia)  Assessment and Plan of Treatment: Low fat diet, exercise daily and continue current medications. Follow up with primary care physician and cardiologist for management.    Diagnosis: DM (diabetes mellitus)  Assessment and Plan of Treatment: Monitor finger sticks pre-meal and bedtime, low salt, fat and carbohydrate diet, minimize glucose intake.  Exercise daily for at least 30 minutes and weight loss.  Follow up with primary care physician and endocrinologist for routine Hemoglobin A1C checks and management.  Follow up with your ophthalmologist for routine yearly vision exams.

## 2019-11-16 DIAGNOSIS — Z02.9 ENCOUNTER FOR ADMINISTRATIVE EXAMINATIONS, UNSPECIFIED: ICD-10-CM

## 2019-11-16 DIAGNOSIS — I21.4 NON-ST ELEVATION (NSTEMI) MYOCARDIAL INFARCTION: ICD-10-CM

## 2019-11-16 LAB
ANION GAP SERPL CALC-SCNC: 14 MMO/L — SIGNIFICANT CHANGE UP (ref 7–14)
ANION GAP SERPL CALC-SCNC: 14 MMO/L — SIGNIFICANT CHANGE UP (ref 7–14)
BUN SERPL-MCNC: 29 MG/DL — HIGH (ref 7–23)
BUN SERPL-MCNC: 29 MG/DL — HIGH (ref 7–23)
CALCIUM SERPL-MCNC: 9.3 MG/DL — SIGNIFICANT CHANGE UP (ref 8.4–10.5)
CALCIUM SERPL-MCNC: 9.3 MG/DL — SIGNIFICANT CHANGE UP (ref 8.4–10.5)
CHLORIDE SERPL-SCNC: 99 MMOL/L — SIGNIFICANT CHANGE UP (ref 98–107)
CHLORIDE SERPL-SCNC: 99 MMOL/L — SIGNIFICANT CHANGE UP (ref 98–107)
CK MB BLD-MCNC: 27.52 NG/ML — HIGH (ref 1–4.7)
CK MB BLD-MCNC: 3.3 — HIGH (ref 0–2.5)
CK SERPL-CCNC: 829 U/L — HIGH (ref 25–170)
CO2 SERPL-SCNC: 24 MMOL/L — SIGNIFICANT CHANGE UP (ref 22–31)
CO2 SERPL-SCNC: 24 MMOL/L — SIGNIFICANT CHANGE UP (ref 22–31)
CREAT SERPL-MCNC: 0.89 MG/DL — SIGNIFICANT CHANGE UP (ref 0.5–1.3)
CREAT SERPL-MCNC: 0.89 MG/DL — SIGNIFICANT CHANGE UP (ref 0.5–1.3)
GLUCOSE BLDC GLUCOMTR-MCNC: 140 MG/DL — HIGH (ref 70–99)
GLUCOSE BLDC GLUCOMTR-MCNC: 144 MG/DL — HIGH (ref 70–99)
GLUCOSE BLDC GLUCOMTR-MCNC: 150 MG/DL — HIGH (ref 70–99)
GLUCOSE BLDC GLUCOMTR-MCNC: 189 MG/DL — HIGH (ref 70–99)
GLUCOSE SERPL-MCNC: 156 MG/DL — HIGH (ref 70–99)
GLUCOSE SERPL-MCNC: 156 MG/DL — HIGH (ref 70–99)
HCT VFR BLD CALC: 43 % — SIGNIFICANT CHANGE UP (ref 34.5–45)
HGB BLD-MCNC: 14.4 G/DL — SIGNIFICANT CHANGE UP (ref 11.5–15.5)
MAGNESIUM SERPL-MCNC: 1.8 MG/DL — SIGNIFICANT CHANGE UP (ref 1.6–2.6)
MCHC RBC-ENTMCNC: 28.9 PG — SIGNIFICANT CHANGE UP (ref 27–34)
MCHC RBC-ENTMCNC: 33.5 % — SIGNIFICANT CHANGE UP (ref 32–36)
MCV RBC AUTO: 86.3 FL — SIGNIFICANT CHANGE UP (ref 80–100)
NRBC # FLD: 0 K/UL — SIGNIFICANT CHANGE UP (ref 0–0)
PHOSPHATE SERPL-MCNC: 3.8 MG/DL — SIGNIFICANT CHANGE UP (ref 2.5–4.5)
PLATELET # BLD AUTO: 263 K/UL — SIGNIFICANT CHANGE UP (ref 150–400)
PMV BLD: 12.1 FL — SIGNIFICANT CHANGE UP (ref 7–13)
POTASSIUM SERPL-MCNC: 3.4 MMOL/L — LOW (ref 3.5–5.3)
POTASSIUM SERPL-MCNC: 3.4 MMOL/L — LOW (ref 3.5–5.3)
POTASSIUM SERPL-SCNC: 3.4 MMOL/L — LOW (ref 3.5–5.3)
POTASSIUM SERPL-SCNC: 3.4 MMOL/L — LOW (ref 3.5–5.3)
RBC # BLD: 4.98 M/UL — SIGNIFICANT CHANGE UP (ref 3.8–5.2)
RBC # FLD: 13.6 % — SIGNIFICANT CHANGE UP (ref 10.3–14.5)
SODIUM SERPL-SCNC: 137 MMOL/L — SIGNIFICANT CHANGE UP (ref 135–145)
SODIUM SERPL-SCNC: 137 MMOL/L — SIGNIFICANT CHANGE UP (ref 135–145)
TROPONIN T, HIGH SENSITIVITY: 5808 NG/L — CRITICAL HIGH (ref ?–14)
WBC # BLD: 10.56 K/UL — HIGH (ref 3.8–10.5)
WBC # FLD AUTO: 10.56 K/UL — HIGH (ref 3.8–10.5)

## 2019-11-16 PROCEDURE — 99233 SBSQ HOSP IP/OBS HIGH 50: CPT

## 2019-11-16 RX ORDER — POTASSIUM CHLORIDE 20 MEQ
40 PACKET (EA) ORAL EVERY 4 HOURS
Refills: 0 | Status: COMPLETED | OUTPATIENT
Start: 2019-11-16 | End: 2019-11-16

## 2019-11-16 RX ORDER — ASPIRIN/CALCIUM CARB/MAGNESIUM 324 MG
1 TABLET ORAL
Qty: 0 | Refills: 0 | DISCHARGE
Start: 2019-11-16

## 2019-11-16 RX ORDER — TICAGRELOR 90 MG/1
1 TABLET ORAL
Qty: 0 | Refills: 0 | DISCHARGE
Start: 2019-11-16

## 2019-11-16 RX ORDER — METOPROLOL TARTRATE 50 MG
12.5 TABLET ORAL
Refills: 0 | Status: DISCONTINUED | OUTPATIENT
Start: 2019-11-16 | End: 2019-11-17

## 2019-11-16 RX ORDER — METOPROLOL TARTRATE 50 MG
12.5 TABLET ORAL
Qty: 0 | Refills: 0 | DISCHARGE
Start: 2019-11-16

## 2019-11-16 RX ORDER — ATORVASTATIN CALCIUM 80 MG/1
1 TABLET, FILM COATED ORAL
Qty: 0 | Refills: 0 | DISCHARGE

## 2019-11-16 RX ORDER — PANTOPRAZOLE SODIUM 20 MG/1
1 TABLET, DELAYED RELEASE ORAL
Qty: 0 | Refills: 0 | DISCHARGE
Start: 2019-11-16

## 2019-11-16 RX ADMIN — Medication 12.5 MILLIGRAM(S): at 11:34

## 2019-11-16 RX ADMIN — Medication 1: at 17:16

## 2019-11-16 RX ADMIN — TICAGRELOR 90 MILLIGRAM(S): 90 TABLET ORAL at 17:15

## 2019-11-16 RX ADMIN — INSULIN GLARGINE 15 UNIT(S): 100 INJECTION, SOLUTION SUBCUTANEOUS at 22:01

## 2019-11-16 RX ADMIN — HEPARIN SODIUM 5000 UNIT(S): 5000 INJECTION INTRAVENOUS; SUBCUTANEOUS at 05:23

## 2019-11-16 RX ADMIN — MONTELUKAST 10 MILLIGRAM(S): 4 TABLET, CHEWABLE ORAL at 11:34

## 2019-11-16 RX ADMIN — Medication 81 MILLIGRAM(S): at 11:34

## 2019-11-16 RX ADMIN — Medication 25 MILLIGRAM(S): at 05:24

## 2019-11-16 RX ADMIN — Medication 12.5 MILLIGRAM(S): at 22:01

## 2019-11-16 RX ADMIN — LISINOPRIL 5 MILLIGRAM(S): 2.5 TABLET ORAL at 05:24

## 2019-11-16 RX ADMIN — TICAGRELOR 90 MILLIGRAM(S): 90 TABLET ORAL at 05:24

## 2019-11-16 RX ADMIN — PANTOPRAZOLE SODIUM 40 MILLIGRAM(S): 20 TABLET, DELAYED RELEASE ORAL at 05:24

## 2019-11-16 RX ADMIN — ATORVASTATIN CALCIUM 80 MILLIGRAM(S): 80 TABLET, FILM COATED ORAL at 22:01

## 2019-11-16 RX ADMIN — Medication 40 MILLIEQUIVALENT(S): at 11:34

## 2019-11-16 RX ADMIN — Medication 40 MILLIEQUIVALENT(S): at 08:51

## 2019-11-16 NOTE — PROGRESS NOTE ADULT - SUBJECTIVE AND OBJECTIVE BOX
Subjective/Objective: Patient feels well, OOB in room no difficulty.    MEDICATIONS  (STANDING):  aspirin enteric coated 81 milliGRAM(s) Oral daily  atorvastatin 80 milliGRAM(s) Oral at bedtime  chlorhexidine 4% Liquid 1 Application(s) Topical <User Schedule>  dextrose 5%. 1000 milliLiter(s) (50 mL/Hr) IV Continuous <Continuous>  dextrose 50% Injectable 12.5 Gram(s) IV Push once  dextrose 50% Injectable 25 Gram(s) IV Push once  dextrose 50% Injectable 25 Gram(s) IV Push once  heparin  Injectable 5000 Unit(s) SubCutaneous every 12 hours  hydrochlorothiazide 25 milliGRAM(s) Oral daily  insulin glargine Injectable (LANTUS) 15 Unit(s) SubCutaneous at bedtime  insulin lispro (HumaLOG) corrective regimen sliding scale   SubCutaneous three times a day before meals  insulin lispro (HumaLOG) corrective regimen sliding scale   SubCutaneous at bedtime  lisinopril 5 milliGRAM(s) Oral daily  montelukast 10 milliGRAM(s) Oral daily  pantoprazole    Tablet 40 milliGRAM(s) Oral before breakfast  potassium chloride    Tablet ER 40 milliEquivalent(s) Oral every 4 hours  ticagrelor 90 milliGRAM(s) Oral every 12 hours    MEDICATIONS  (PRN):  dextrose 40% Gel 15 Gram(s) Oral once PRN Blood Glucose LESS THAN 70 milliGRAM(s)/deciliter  glucagon  Injectable 1 milliGRAM(s) IntraMuscular once PRN Glucose LESS THAN 70 milligrams/deciliter          Vital Signs Last 24 Hrs  T(C): 36.7 (16 Nov 2019 05:23), Max: 37.2 (15 Nov 2019 16:17)  T(F): 98 (16 Nov 2019 05:23), Max: 98.9 (15 Nov 2019 16:17)  HR: 80 (16 Nov 2019 05:23) (80 - 100)  BP: 109/70 (16 Nov 2019 05:23) (108/79 - 141/80)  BP(mean): 82 (15 Nov 2019 18:00) (74 - 95)  RR: 17 (16 Nov 2019 05:23) (15 - 23)  SpO2: 100% (16 Nov 2019 05:23) (96% - 100%)  I&O's Detail    15 Nov 2019 07:01  -  16 Nov 2019 07:00  --------------------------------------------------------  IN:    Oral Fluid: 720 mL  Total IN: 720 mL    OUT:    Voided: 1050 mL  Total OUT: 1050 mL    Total NET: -330 mL    PHYSICAL EXAM  GEN: NAD, skin W & D  RESP: CTA B/L  CV: nl S1S2  GI: soft, NT/ND  EXT: no C/C/E  NEURO: A & O X 3      EKG/ TELEM: NSR    LABS:                          14.4   10.56 )-----------( 263      ( 16 Nov 2019 06:00 )             43.0     PT/INR - ( 14 Nov 2019 18:00 )   PT: 11.1 SEC;   INR: 0.97          PTT - ( 14 Nov 2019 18:00 )  PTT:32.2 SEC  16 Nov 2019 06:00    137    |  99     |  29<H>  ----------------------------<  156<H>  3.4<L>   |  24     |  0.89     15 Nov 2019 06:00    137    |  99     |  16     ----------------------------<  148<H>  4.3     |  26     |  0.75     Ca    9.3        16 Nov 2019 06:00  Ca    9.5        15 Nov 2019 06:00  Phos  3.8       16 Nov 2019 06:00  Phos  4.1       15 Nov 2019 06:00  Mg     1.8       16 Nov 2019 06:00  Mg     2.1       15 Nov 2019 06:00    TPro  6.8    /  Alb  3.7    /  TBili  0.8    /  DBili  x      /  AST  288<H>  /  ALT  48<H>  /  AlkPhos  59     15 Nov 2019 06:00  TPro  7.0    /  Alb  3.6    /  TBili  0.6    /  DBili  x      /  AST  177<H>  /  ALT  37<H>  /  AlkPhos  58     15 Nov 2019 00:46    CARDIAC MARKERS ( 16 Nov 2019 06:00 )  x     / x     / 829 u/L / 27.52 ng/mL / x      CARDIAC MARKERS ( 15 Nov 2019 14:00 )  x     / x     / 1705 u/L / 148.50 ng/mL / x      CARDIAC MARKERS ( 15 Nov 2019 06:00 )  x     / x     / 2648 u/L / 353.20 ng/mL / x      CARDIAC MARKERS ( 15 Nov 2019 00:46 )  x     / x     / 2086 u/L / 309.80 ng/mL / x      CARDIAC MARKERS ( 14 Nov 2019 18:00 )  x     / x     / 262 u/L / 7.57 ng/mL / x          Troponin T, High Sensitivity: 5808 ng/L (11-16-19 @ 06:00)  Troponin T, High Sensitivity: 6901 ng/L (11-15-19 @ 14:00)  Troponin T, High Sensitivity: 3299 ng/L (11-15-19 @ 00:46)    Creatine Kinase, Serum: 829 u/L (11-16-19 @ 06:00)  Creatine Kinase, Serum: 1705 u/L (11-15-19 @ 14:00)  Creatine Kinase, Serum: 2648 u/L (11-15-19 @ 06:00)  Creatine Kinase, Serum: 2086 u/L (11-15-19 @ 00:46)  Creatine Kinase, Serum: 262 u/L (11-14-19 @ 18:00)    CKMB: 27.52 ng/mL (11-16-19 @ 06:00)  CKMB: 148.50 ng/mL (11-15-19 @ 14:00)  CKMB: 353.20 ng/mL (11-15-19 @ 06:00)  CKMB: 309.80 ng/mL (11-15-19 @ 00:46)  CKMB: 7.57 ng/mL (11-14-19 @ 18:00)      Hemoglobin A1C, Whole Blood: 8.6 % (11-15-19 @ 06:00)  Hemoglobin A1C, Whole Blood: 8.8 % (11-15-19 @ 00:40)

## 2019-11-17 ENCOUNTER — TRANSCRIPTION ENCOUNTER (OUTPATIENT)
Age: 64
End: 2019-11-17

## 2019-11-17 VITALS
HEART RATE: 79 BPM | DIASTOLIC BLOOD PRESSURE: 86 MMHG | OXYGEN SATURATION: 100 % | TEMPERATURE: 98 F | RESPIRATION RATE: 17 BRPM | SYSTOLIC BLOOD PRESSURE: 125 MMHG

## 2019-11-17 LAB
ANION GAP SERPL CALC-SCNC: 13 MMO/L — SIGNIFICANT CHANGE UP (ref 7–14)
BUN SERPL-MCNC: 32 MG/DL — HIGH (ref 7–23)
CALCIUM SERPL-MCNC: 9.6 MG/DL — SIGNIFICANT CHANGE UP (ref 8.4–10.5)
CHLORIDE SERPL-SCNC: 103 MMOL/L — SIGNIFICANT CHANGE UP (ref 98–107)
CO2 SERPL-SCNC: 25 MMOL/L — SIGNIFICANT CHANGE UP (ref 22–31)
CREAT SERPL-MCNC: 0.94 MG/DL — SIGNIFICANT CHANGE UP (ref 0.5–1.3)
GLUCOSE BLDC GLUCOMTR-MCNC: 143 MG/DL — HIGH (ref 70–99)
GLUCOSE BLDC GLUCOMTR-MCNC: 164 MG/DL — HIGH (ref 70–99)
GLUCOSE SERPL-MCNC: 157 MG/DL — HIGH (ref 70–99)
HCT VFR BLD CALC: 42 % — SIGNIFICANT CHANGE UP (ref 34.5–45)
HGB BLD-MCNC: 14.2 G/DL — SIGNIFICANT CHANGE UP (ref 11.5–15.5)
MAGNESIUM SERPL-MCNC: 1.7 MG/DL — SIGNIFICANT CHANGE UP (ref 1.6–2.6)
MCHC RBC-ENTMCNC: 29.5 PG — SIGNIFICANT CHANGE UP (ref 27–34)
MCHC RBC-ENTMCNC: 33.8 % — SIGNIFICANT CHANGE UP (ref 32–36)
MCV RBC AUTO: 87.3 FL — SIGNIFICANT CHANGE UP (ref 80–100)
NRBC # FLD: 0 K/UL — SIGNIFICANT CHANGE UP (ref 0–0)
PLATELET # BLD AUTO: 245 K/UL — SIGNIFICANT CHANGE UP (ref 150–400)
PMV BLD: 11.8 FL — SIGNIFICANT CHANGE UP (ref 7–13)
POTASSIUM SERPL-MCNC: 3.8 MMOL/L — SIGNIFICANT CHANGE UP (ref 3.5–5.3)
POTASSIUM SERPL-SCNC: 3.8 MMOL/L — SIGNIFICANT CHANGE UP (ref 3.5–5.3)
RBC # BLD: 4.81 M/UL — SIGNIFICANT CHANGE UP (ref 3.8–5.2)
RBC # FLD: 13.4 % — SIGNIFICANT CHANGE UP (ref 10.3–14.5)
SODIUM SERPL-SCNC: 141 MMOL/L — SIGNIFICANT CHANGE UP (ref 135–145)
WBC # BLD: 9.99 K/UL — SIGNIFICANT CHANGE UP (ref 3.8–10.5)
WBC # FLD AUTO: 9.99 K/UL — SIGNIFICANT CHANGE UP (ref 3.8–10.5)

## 2019-11-17 PROCEDURE — 99232 SBSQ HOSP IP/OBS MODERATE 35: CPT

## 2019-11-17 RX ORDER — CLOPIDOGREL BISULFATE 75 MG/1
75 TABLET, FILM COATED ORAL DAILY
Refills: 0 | Status: CANCELLED | OUTPATIENT
Start: 2019-11-19 | End: 2019-11-17

## 2019-11-17 RX ORDER — ATORVASTATIN CALCIUM 80 MG/1
1 TABLET, FILM COATED ORAL
Qty: 30 | Refills: 0
Start: 2019-11-17 | End: 2019-12-16

## 2019-11-17 RX ORDER — LISINOPRIL 2.5 MG/1
1 TABLET ORAL
Qty: 30 | Refills: 0
Start: 2019-11-17 | End: 2019-12-16

## 2019-11-17 RX ORDER — METOPROLOL TARTRATE 50 MG
0.5 TABLET ORAL
Qty: 30 | Refills: 0
Start: 2019-11-17 | End: 2019-12-16

## 2019-11-17 RX ORDER — CLOPIDOGREL BISULFATE 75 MG/1
600 TABLET, FILM COATED ORAL
Refills: 0 | Status: DISCONTINUED | OUTPATIENT
Start: 2019-11-18 | End: 2019-11-17

## 2019-11-17 RX ORDER — OMEPRAZOLE 10 MG/1
1 CAPSULE, DELAYED RELEASE ORAL
Qty: 30 | Refills: 0
Start: 2019-11-17 | End: 2019-12-16

## 2019-11-17 RX ORDER — PANTOPRAZOLE SODIUM 20 MG/1
1 TABLET, DELAYED RELEASE ORAL
Qty: 30 | Refills: 0
Start: 2019-11-17 | End: 2019-12-16

## 2019-11-17 RX ORDER — INSULIN GLARGINE 100 [IU]/ML
15 INJECTION, SOLUTION SUBCUTANEOUS
Qty: 0 | Refills: 0 | DISCHARGE
Start: 2019-11-17

## 2019-11-17 RX ORDER — AMLODIPINE BESYLATE 2.5 MG/1
1 TABLET ORAL
Qty: 0 | Refills: 0 | DISCHARGE

## 2019-11-17 RX ORDER — ATORVASTATIN CALCIUM 80 MG/1
1 TABLET, FILM COATED ORAL
Qty: 0 | Refills: 0 | DISCHARGE

## 2019-11-17 RX ORDER — LISINOPRIL 2.5 MG/1
1 TABLET ORAL
Qty: 0 | Refills: 0 | DISCHARGE

## 2019-11-17 RX ORDER — TICAGRELOR 90 MG/1
1 TABLET ORAL
Qty: 60 | Refills: 0
Start: 2019-11-17 | End: 2019-12-16

## 2019-11-17 RX ADMIN — Medication 81 MILLIGRAM(S): at 12:01

## 2019-11-17 RX ADMIN — Medication 1: at 12:01

## 2019-11-17 RX ADMIN — HEPARIN SODIUM 5000 UNIT(S): 5000 INJECTION INTRAVENOUS; SUBCUTANEOUS at 05:55

## 2019-11-17 RX ADMIN — TICAGRELOR 90 MILLIGRAM(S): 90 TABLET ORAL at 05:54

## 2019-11-17 RX ADMIN — Medication 25 MILLIGRAM(S): at 05:54

## 2019-11-17 RX ADMIN — PANTOPRAZOLE SODIUM 40 MILLIGRAM(S): 20 TABLET, DELAYED RELEASE ORAL at 05:54

## 2019-11-17 RX ADMIN — Medication 12.5 MILLIGRAM(S): at 05:55

## 2019-11-17 RX ADMIN — MONTELUKAST 10 MILLIGRAM(S): 4 TABLET, CHEWABLE ORAL at 12:01

## 2019-11-17 RX ADMIN — LISINOPRIL 5 MILLIGRAM(S): 2.5 TABLET ORAL at 05:54

## 2019-11-17 NOTE — PROGRESS NOTE ADULT - ATTENDING COMMENTS
Patient seen and examined  Agree with above sp NSTEMI with PCI x 2   Cont current meds
personally saw and examined patient  labs/vitals reviewed  agree with above assessment and plan
personally saw and examined patient  labs/vitals reviewed  agree with above assessment and plan

## 2019-11-17 NOTE — DISCHARGE NOTE NURSING/CASE MANAGEMENT/SOCIAL WORK - PATIENT PORTAL LINK FT
You can access the FollowMyHealth Patient Portal offered by [facility] by registering at the following website: [email or web address]. By joining [facility]’s FollowMyHealth portal, you will also be able to view your health information using other applications (apps) compatible with our system.

## 2019-11-17 NOTE — PROGRESS NOTE ADULT - SUBJECTIVE AND OBJECTIVE BOX
Subjective/Objective: Patient feels well, OOB in room.    MEDICATIONS  (STANDING):  aspirin enteric coated 81 milliGRAM(s) Oral daily  atorvastatin 80 milliGRAM(s) Oral at bedtime  chlorhexidine 4% Liquid 1 Application(s) Topical <User Schedule>  dextrose 5%. 1000 milliLiter(s) (50 mL/Hr) IV Continuous <Continuous>  dextrose 50% Injectable 12.5 Gram(s) IV Push once  dextrose 50% Injectable 25 Gram(s) IV Push once  dextrose 50% Injectable 25 Gram(s) IV Push once  heparin  Injectable 5000 Unit(s) SubCutaneous every 12 hours  hydrochlorothiazide 25 milliGRAM(s) Oral daily  insulin glargine Injectable (LANTUS) 15 Unit(s) SubCutaneous at bedtime  insulin lispro (HumaLOG) corrective regimen sliding scale   SubCutaneous three times a day before meals  insulin lispro (HumaLOG) corrective regimen sliding scale   SubCutaneous at bedtime  lisinopril 5 milliGRAM(s) Oral daily  metoprolol tartrate 12.5 milliGRAM(s) Oral two times a day  montelukast 10 milliGRAM(s) Oral daily  pantoprazole    Tablet 40 milliGRAM(s) Oral before breakfast  ticagrelor 90 milliGRAM(s) Oral every 12 hours    MEDICATIONS  (PRN):  dextrose 40% Gel 15 Gram(s) Oral once PRN Blood Glucose LESS THAN 70 milliGRAM(s)/deciliter  glucagon  Injectable 1 milliGRAM(s) IntraMuscular once PRN Glucose LESS THAN 70 milligrams/deciliter          Vital Signs Last 24 Hrs  T(C): 36.8 (17 Nov 2019 05:53), Max: 37.2 (16 Nov 2019 21:57)  T(F): 98.3 (17 Nov 2019 05:53), Max: 98.9 (16 Nov 2019 21:57)  HR: 79 (17 Nov 2019 05:53) (79 - 82)  BP: 125/86 (17 Nov 2019 05:53) (104/67 - 125/86)  BP(mean): --  RR: 17 (17 Nov 2019 05:53) (17 - 17)  SpO2: 100% (17 Nov 2019 05:53) (100% - 100%)  I&O's Detail    16 Nov 2019 07:01  -  17 Nov 2019 07:00  --------------------------------------------------------  IN:    Oral Fluid: 400 mL  Total IN: 400 mL    OUT:  Total OUT: 0 mL    Total NET: 400 mL    PHYSICAL EXAM  GEN: NAD, skin W & D  RESP: CTA B/L  CV: nl S1S2  GI: soft, NT/ND  EXT: no C/C/E  NEURO: A & O X 3    EKG/ TELEM: NSR    LABS:                          14.2   9.99  )-----------( 245      ( 17 Nov 2019 07:18 )             42.0       17 Nov 2019 07:18    141    |  103    |  32<H>  ----------------------------<  157<H>  3.8     |  25     |  0.94     16 Nov 2019 06:00    137    |  99     |  29<H>  ----------------------------<  156<H>  3.4<L>   |  24     |  0.89     Ca    9.6        17 Nov 2019 07:18  Ca    9.3        16 Nov 2019 06:00  Phos  3.8       16 Nov 2019 06:00  Mg     1.7       17 Nov 2019 07:18  Mg     1.8       16 Nov 2019 06:00      CARDIAC MARKERS ( 16 Nov 2019 06:00 )  x     / x     / 829 u/L / 27.52 ng/mL / x      CARDIAC MARKERS ( 15 Nov 2019 14:00 )  x     / x     / 1705 u/L / 148.50 ng/mL / x          Troponin T, High Sensitivity: 5808 ng/L (11-16-19 @ 06:00)  Troponin T, High Sensitivity: 6901 ng/L (11-15-19 @ 14:00)  Troponin T, High Sensitivity: 3299 ng/L (11-15-19 @ 00:46)    Creatine Kinase, Serum: 829 u/L (11-16-19 @ 06:00)  Creatine Kinase, Serum: 1705 u/L (11-15-19 @ 14:00)  Creatine Kinase, Serum: 2648 u/L (11-15-19 @ 06:00)  Creatine Kinase, Serum: 2086 u/L (11-15-19 @ 00:46)  Creatine Kinase, Serum: 262 u/L (11-14-19 @ 18:00)    CKMB: 27.52 ng/mL (11-16-19 @ 06:00)  CKMB: 148.50 ng/mL (11-15-19 @ 14:00)  CKMB: 353.20 ng/mL (11-15-19 @ 06:00)  CKMB: 309.80 ng/mL (11-15-19 @ 00:46)  CKMB: 7.57 ng/mL (11-14-19 @ 18:00)

## 2019-11-17 NOTE — PROGRESS NOTE ADULT - PROBLEM SELECTOR PROBLEM 2
Discharge planning issues
Type 2 diabetes mellitus with other specified complication, with long-term current use of insulin
Type 2 diabetes mellitus with other specified complication, with long-term current use of insulin

## 2019-11-17 NOTE — PROGRESS NOTE ADULT - PROBLEM SELECTOR PLAN 1
Continue DAPT, lipitor, and Lisinopril. Tolerating metoprolol well.
Continue DAPT, lipitor, lisinopril. Will add low dose metoprolol today. Cardiac enzymes trending down.
s/p L. cath pLCx 60% stentx1, pOM1 100% (MAURO) with EF 35% and EDP 25  cw asa, brinlita, atorvastatin  start metoprolol tomorrow  f/u echo  trend cardiac enzymes

## 2019-11-17 NOTE — PROGRESS NOTE ADULT - PROBLEM SELECTOR PLAN 2
D/C planning today with outpatient followup in 1-2 weeks.
FS stable, continue diet, lantus, and IHSS.
SSI and FS  A1c 8.6

## 2019-11-17 NOTE — PROGRESS NOTE ADULT - ASSESSMENT
64F with HTN, HLD, and DM presents with chest pain and found to have NSTEMI, now s/p MAURO to pLCX and pOM1.
64F with HTN, HLD, and DM presents with chest pain and found to have NSTEMI, now s/p MAURO to pLCX and pOM1.
65yo F with PMH of HTN, HLD, asthma, DM2 presented with chest tightness 2/2 inferiorlateral STEMI s/p L. cath pLCx 60% stentx1, pOM1 100% (MAURO) with EF 35% and EDP 25, on ASA, brilinta, and atorvastatin.

## 2019-11-17 NOTE — PROGRESS NOTE ADULT - PROBLEM SELECTOR PROBLEM 1
Non-ST elevation MI (NSTEMI)
Non-ST elevation MI (NSTEMI)
ST elevation myocardial infarction (STEMI), unspecified artery

## 2019-11-18 RX ORDER — CLOPIDOGREL BISULFATE 75 MG/1
2 TABLET, FILM COATED ORAL
Qty: 2 | Refills: 0
Start: 2019-11-18 | End: 2019-11-18

## 2019-11-19 RX ORDER — CLOPIDOGREL BISULFATE 75 MG/1
1 TABLET, FILM COATED ORAL
Qty: 30 | Refills: 0
Start: 2019-11-19 | End: 2019-12-18

## 2019-11-21 PROBLEM — E78.5 HYPERLIPIDEMIA, UNSPECIFIED: Chronic | Status: ACTIVE | Noted: 2019-11-14

## 2019-11-21 PROBLEM — Z00.00 ENCOUNTER FOR PREVENTIVE HEALTH EXAMINATION: Status: ACTIVE | Noted: 2019-11-21

## 2019-11-21 PROBLEM — E11.9 TYPE 2 DIABETES MELLITUS WITHOUT COMPLICATIONS: Chronic | Status: ACTIVE | Noted: 2019-11-14

## 2019-11-21 PROBLEM — I10 ESSENTIAL (PRIMARY) HYPERTENSION: Chronic | Status: ACTIVE | Noted: 2019-11-14

## 2019-11-26 ENCOUNTER — RECORD ABSTRACTING (OUTPATIENT)
Age: 64
End: 2019-11-26

## 2019-11-26 DIAGNOSIS — I21.3 ST ELEVATION (STEMI) MYOCARDIAL INFARCTION OF UNSPECIFIED SITE: ICD-10-CM

## 2019-11-26 DIAGNOSIS — I82.90 ACUTE EMBOLISM AND THROMBOSIS OF UNSPECIFIED VEIN: ICD-10-CM

## 2019-11-26 DIAGNOSIS — Z78.9 OTHER SPECIFIED HEALTH STATUS: ICD-10-CM

## 2019-11-26 DIAGNOSIS — I10 ESSENTIAL (PRIMARY) HYPERTENSION: ICD-10-CM

## 2019-11-26 DIAGNOSIS — E78.5 HYPERLIPIDEMIA, UNSPECIFIED: ICD-10-CM

## 2019-11-26 DIAGNOSIS — E11.9 TYPE 2 DIABETES MELLITUS W/OUT COMPLICATIONS: ICD-10-CM

## 2019-11-26 RX ORDER — GLYBURIDE 5 MG/1
5 TABLET ORAL TWICE DAILY
Refills: 0 | Status: ACTIVE | COMMUNITY

## 2019-11-26 RX ORDER — METFORMIN HYDROCHLORIDE 500 MG/1
500 TABLET, COATED ORAL TWICE DAILY
Refills: 0 | Status: ACTIVE | COMMUNITY

## 2019-11-26 RX ORDER — MONTELUKAST 10 MG/1
10 TABLET, FILM COATED ORAL DAILY
Refills: 0 | Status: ACTIVE | COMMUNITY

## 2019-11-26 RX ORDER — OMEPRAZOLE 20 MG/1
20 TABLET, DELAYED RELEASE ORAL DAILY
Refills: 0 | Status: ACTIVE | COMMUNITY

## 2019-11-26 RX ORDER — HYDROCHLOROTHIAZIDE 25 MG/1
25 TABLET ORAL DAILY
Refills: 0 | Status: ACTIVE | COMMUNITY

## 2019-11-26 RX ORDER — ATORVASTATIN CALCIUM 80 MG/1
80 TABLET, FILM COATED ORAL
Qty: 90 | Refills: 3 | Status: ACTIVE | COMMUNITY

## 2019-11-27 ENCOUNTER — NON-APPOINTMENT (OUTPATIENT)
Age: 64
End: 2019-11-27

## 2019-11-27 ENCOUNTER — APPOINTMENT (OUTPATIENT)
Dept: CARDIOLOGY | Facility: CLINIC | Age: 64
End: 2019-11-27
Payer: COMMERCIAL

## 2019-11-27 VITALS
WEIGHT: 172 LBS | HEART RATE: 80 BPM | BODY MASS INDEX: 30.48 KG/M2 | HEIGHT: 63 IN | OXYGEN SATURATION: 98 % | DIASTOLIC BLOOD PRESSURE: 79 MMHG | SYSTOLIC BLOOD PRESSURE: 144 MMHG

## 2019-11-27 PROCEDURE — 99214 OFFICE O/P EST MOD 30 MIN: CPT

## 2019-11-27 PROCEDURE — 93000 ELECTROCARDIOGRAM COMPLETE: CPT

## 2019-11-27 RX ORDER — BLOOD SUGAR DIAGNOSTIC
STRIP MISCELLANEOUS
Qty: 200 | Refills: 0 | Status: ACTIVE | COMMUNITY
Start: 2019-11-22

## 2019-11-27 RX ORDER — ATORVASTATIN CALCIUM 20 MG/1
20 TABLET, FILM COATED ORAL
Qty: 90 | Refills: 0 | Status: DISCONTINUED | COMMUNITY
Start: 2019-09-05 | End: 2019-11-27

## 2019-11-27 RX ORDER — PEN NEEDLE, DIABETIC 29 G X1/2"
31G X 8 MM NEEDLE, DISPOSABLE MISCELLANEOUS
Qty: 50 | Refills: 0 | Status: ACTIVE | COMMUNITY
Start: 2019-09-05

## 2019-11-27 RX ORDER — LISINOPRIL AND HYDROCHLOROTHIAZIDE TABLETS 20; 25 MG/1; MG/1
20-25 TABLET ORAL
Qty: 180 | Refills: 0 | Status: DISCONTINUED | COMMUNITY
Start: 2019-02-01 | End: 2019-11-27

## 2019-11-27 RX ORDER — BLOOD-GLUCOSE METER
EACH MISCELLANEOUS
Qty: 300 | Refills: 0 | Status: ACTIVE | COMMUNITY
Start: 2019-11-22

## 2019-11-27 RX ORDER — CLOPIDOGREL BISULFATE 75 MG/1
75 TABLET, FILM COATED ORAL
Qty: 90 | Refills: 3 | Status: ACTIVE | COMMUNITY
Start: 1900-01-01 | End: 1900-01-01

## 2019-11-27 RX ORDER — BLOOD-GLUCOSE METER
KIT MISCELLANEOUS
Qty: 1 | Refills: 0 | Status: ACTIVE | COMMUNITY
Start: 2019-11-23

## 2019-11-27 RX ORDER — ALCOHOL ANTISEPTIC PADS
33G X 5 MM PADS, MEDICATED (EA) TOPICAL
Qty: 100 | Refills: 0 | Status: ACTIVE | COMMUNITY
Start: 2019-11-22

## 2019-11-27 RX ORDER — METOPROLOL TARTRATE 25 MG/1
25 TABLET, FILM COATED ORAL TWICE DAILY
Refills: 0 | Status: DISCONTINUED | COMMUNITY
End: 2019-11-27

## 2019-11-27 RX ORDER — GLYBURIDE AND METFORMIN HYDROCHLORIDE 5; 500 MG/1; MG/1
5-500 TABLET, FILM COATED ORAL
Qty: 360 | Refills: 0 | Status: DISCONTINUED | COMMUNITY
Start: 2019-11-22 | End: 2019-11-27

## 2019-11-27 RX ORDER — CARVEDILOL 6.25 MG/1
6.25 TABLET, FILM COATED ORAL TWICE DAILY
Qty: 180 | Refills: 2 | Status: ACTIVE | COMMUNITY
Start: 2019-11-27 | End: 1900-01-01

## 2019-11-27 RX ORDER — ASPIRIN ENTERIC COATED TABLETS 81 MG 81 MG/1
81 TABLET, DELAYED RELEASE ORAL
Qty: 90 | Refills: 3 | Status: ACTIVE | COMMUNITY
Start: 1900-01-01 | End: 1900-01-01

## 2019-11-27 RX ORDER — INSULIN DETEMIR 100 [IU]/ML
100 INJECTION, SOLUTION SUBCUTANEOUS
Qty: 15 | Refills: 0 | Status: ACTIVE | COMMUNITY
Start: 2019-11-22

## 2019-11-27 RX ORDER — LISINOPRIL 10 MG/1
10 TABLET ORAL DAILY
Qty: 30 | Refills: 3 | Status: ACTIVE | COMMUNITY
Start: 1900-01-01 | End: 1900-01-01

## 2019-11-27 RX ORDER — FLUTICASONE PROPIONATE AND SALMETEROL 250; 50 UG/1; UG/1
250-50 POWDER RESPIRATORY (INHALATION)
Qty: 60 | Refills: 0 | Status: ACTIVE | COMMUNITY
Start: 2019-02-28

## 2019-11-27 RX ORDER — AMLODIPINE BESYLATE 5 MG/1
5 TABLET ORAL
Qty: 90 | Refills: 0 | Status: ACTIVE | COMMUNITY
Start: 2019-06-07

## 2019-11-27 NOTE — REVIEW OF SYSTEMS
[Fever] : no fever [Blurry Vision] : no blurred vision [Earache] : no earache [Shortness Of Breath] : no shortness of breath [Cough] : no cough [Heartburn] : no heartburn [Incontinence] : no incontinence [Skin: A Rash] : no rash: [Dizziness] : no dizziness [Excessive Thirst] : no polydipsia

## 2019-11-27 NOTE — DISCUSSION/SUMMARY
[FreeTextEntry1] : CAD: continue DAPT and aggressive risk factor modification \par \par HTN: Increase Lisinopril to 10mg po daily and change metoprolol to carvedilol. \par \par Ischemic CM: no symptoms, changes as above and repeat TTE in 3 months.

## 2019-11-27 NOTE — HISTORY OF PRESENT ILLNESS
[FreeTextEntry1] : 64 yr F with CAD STEMI s/p MAURO to LCx and OM-1 11/14/2109, EF 40%\par \par No chest pain, dyspnea, palpitations, syncope or presyncope.

## 2019-11-27 NOTE — PHYSICAL EXAM
[General Appearance - Well Developed] : well developed [Normal Conjunctiva] : the conjunctiva exhibited no abnormalities [Normal Oropharynx] : normal oropharynx [Normal Jugular Venous V Waves Present] : normal jugular venous V waves present [Heart Sounds] : normal S1 and S2 [Respiration, Rhythm And Depth] : normal respiratory rhythm and effort [Abdomen Soft] : soft [Abnormal Walk] : normal gait

## 2020-02-19 ENCOUNTER — APPOINTMENT (OUTPATIENT)
Dept: CV DIAGNOSITCS | Facility: HOSPITAL | Age: 65
End: 2020-02-19
Payer: COMMERCIAL

## 2020-02-19 ENCOUNTER — NON-APPOINTMENT (OUTPATIENT)
Age: 65
End: 2020-02-19

## 2020-02-19 ENCOUNTER — APPOINTMENT (OUTPATIENT)
Dept: CARDIOLOGY | Facility: CLINIC | Age: 65
End: 2020-02-19
Payer: COMMERCIAL

## 2020-02-19 ENCOUNTER — OUTPATIENT (OUTPATIENT)
Dept: OUTPATIENT SERVICES | Facility: HOSPITAL | Age: 65
LOS: 1 days | End: 2020-02-19

## 2020-02-19 VITALS
HEART RATE: 76 BPM | SYSTOLIC BLOOD PRESSURE: 167 MMHG | BODY MASS INDEX: 30.48 KG/M2 | OXYGEN SATURATION: 99 % | DIASTOLIC BLOOD PRESSURE: 108 MMHG | HEIGHT: 63 IN | WEIGHT: 172 LBS

## 2020-02-19 VITALS — DIASTOLIC BLOOD PRESSURE: 86 MMHG | SYSTOLIC BLOOD PRESSURE: 142 MMHG

## 2020-02-19 DIAGNOSIS — I21.3 ST ELEVATION (STEMI) MYOCARDIAL INFARCTION OF UNSPECIFIED SITE: ICD-10-CM

## 2020-02-19 DIAGNOSIS — Z90.49 ACQUIRED ABSENCE OF OTHER SPECIFIED PARTS OF DIGESTIVE TRACT: Chronic | ICD-10-CM

## 2020-02-19 PROCEDURE — 93306 TTE W/DOPPLER COMPLETE: CPT | Mod: 26

## 2020-02-19 PROCEDURE — 93000 ELECTROCARDIOGRAM COMPLETE: CPT

## 2020-02-19 PROCEDURE — 99215 OFFICE O/P EST HI 40 MIN: CPT

## 2020-02-19 NOTE — PHYSICAL EXAM
[General Appearance - Well Developed] : well developed [Normal Conjunctiva] : the conjunctiva exhibited no abnormalities [Normal Oropharynx] : normal oropharynx [Normal Jugular Venous V Waves Present] : normal jugular venous V waves present [Respiration, Rhythm And Depth] : normal respiratory rhythm and effort [Heart Sounds] : normal S1 and S2 [Abdomen Soft] : soft [Abnormal Walk] : normal gait

## 2020-02-27 NOTE — REVIEW OF SYSTEMS
[Fever] : no fever [Earache] : no earache [Shortness Of Breath] : no shortness of breath [Blurry Vision] : no blurred vision [Heartburn] : no heartburn [Cough] : no cough [Skin: A Rash] : no rash: [Incontinence] : no incontinence [Dizziness] : no dizziness [Excessive Thirst] : no polydipsia

## 2020-05-20 ENCOUNTER — APPOINTMENT (OUTPATIENT)
Dept: CARDIOLOGY | Facility: CLINIC | Age: 65
End: 2020-05-20

## 2020-06-05 ENCOUNTER — APPOINTMENT (OUTPATIENT)
Dept: CARDIOLOGY | Facility: CLINIC | Age: 65
End: 2020-06-05

## 2020-06-29 ENCOUNTER — RESULT REVIEW (OUTPATIENT)
Age: 65
End: 2020-06-29

## 2020-07-20 ENCOUNTER — APPOINTMENT (OUTPATIENT)
Dept: SURGERY | Facility: CLINIC | Age: 65
End: 2020-07-20
Payer: COMMERCIAL

## 2020-07-20 VITALS
TEMPERATURE: 97.2 F | DIASTOLIC BLOOD PRESSURE: 102 MMHG | SYSTOLIC BLOOD PRESSURE: 152 MMHG | OXYGEN SATURATION: 99 % | HEART RATE: 97 BPM | HEIGHT: 63 IN | BODY MASS INDEX: 29.77 KG/M2 | WEIGHT: 168 LBS

## 2020-07-20 DIAGNOSIS — C50.911 MALIGNANT NEOPLASM OF UNSPECIFIED SITE OF RIGHT FEMALE BREAST: ICD-10-CM

## 2020-07-20 PROCEDURE — 99204 OFFICE O/P NEW MOD 45 MIN: CPT

## 2020-07-20 NOTE — ASSESSMENT
[FreeTextEntry1] : SATINDER KELLY is a 65 year old F w PMHX T2DM, Asthma, hx MI/ 2 coronary stents placed, (11/2019) who is referred to the office for consultation visit, she presents w recently diagnosed, R. breast CA. Patient is S/P R. breast retroareolar, 12:00 core BX, 06/29/20; results c/w DCIS; ER/OR positive. Path results were consistent with DCIS, solid papillary pattern, which is concordant w imaging.

## 2020-07-20 NOTE — CONSULT LETTER
[Dear  ___] : Dear  [unfilled], [Consult Closing:] : Thank you very much for allowing me to participate in the care of this patient.  If you have any questions, please do not hesitate to contact me. [Consult Letter:] : I had the pleasure of evaluating your patient, [unfilled]. [Sincerely,] : Sincerely, [FreeTextEntry3] : Good Lopez MD\par

## 2020-07-20 NOTE — REVIEW OF SYSTEMS
[Chills] : no chills [Feeling Poorly] : not feeling poorly [Fever] : no fever [Shortness Of Breath] : no shortness of breath [Lower Ext Edema] : no lower extremity edema [Abdominal Pain] : no abdominal pain [Wheezing] : no wheezing [Pelvic Pain] : no pelvic pain [Skin Lesions] : no skin lesions [Joint Stiffness] : no joint stiffness [Joint Swelling] : no joint swelling [Skin Wound] : no skin wound [Breast Pain] : no breast pain [Dizziness] : no dizziness [Breast Lump] : no breast lump [Anxiety] : no anxiety [Muscle Weakness] : no muscle weakness [Swollen Glands] : no swollen glands [FreeTextEntry6] : hx asthma, denies steroid use. denies intubation/hospitalization hx ; denies SOB [de-identified] : R. breast, DCIS [FreeTextEntry5] : recently had MI, /CAD- 2 stents placed (Nov 2019)

## 2020-07-20 NOTE — PLAN
[FreeTextEntry1] : Had  a long d/w the patient. She has newly diagnosed Right breast cancer/DCIS. All the options, benefits and risks were discussed. The options between breast conservation versus mastectomy were discussed. Lumpectomy followed by radiation therapy versus mastectomy with or without reconstruction were discussed. The potential complications including infection, bleeding, upper extremity swelling, recurrence and other complications were also discussed. All the questions were answered to her satisfaction. \par \par We will schedule for pre op needle loc followed by R. breast lumpectomy, at [facility] [place]. \par \par

## 2020-07-20 NOTE — HISTORY OF PRESENT ILLNESS
[de-identified] : SATINDER KELLY is a 65 year old F w PMHX T2DM, Asthma, hx MI/ 2 coronary stents placed, (11/2019) who is referred to the office for consultation visit, she presents w recently diagnosed, R. breast CA. Patient is S/P R. breast retroareolar, 12:00 core BX, 06/29/20; results c/w DCIS; ER/DC positive. Path results were consistent with DCIS, solid papillary pattern, which is concordant w imaging. \par FHx: Mother - had Breast CA. S/P mastectomy- Age 62 \par \par Denies breast pain. Denies palpable breast masses/lumps to either breast. No nipple discharge, no nipple retraction/inversion, or skin changes. Denies constitutional symptoms.\par No previous breast biopsies reported.

## 2020-07-20 NOTE — REASON FOR VISIT
[Consultation] : a consultation visit [Family Member] : family member [FreeTextEntry1] : DCIS, R. breast

## 2020-07-20 NOTE — PHYSICAL EXAM
[Normal Rate and Rhythm] : normal rate and rhythm [Normal Breath Sounds] : Normal breath sounds [No Rash or Lesion] : No rash or lesion [Oriented to Person] : oriented to person [Alert] : alert [Oriented to Place] : oriented to place [Calm] : calm [Oriented to Time] : oriented to time [JVD] : no jugular venous distention  [de-identified] : A/Ox3; NAD. appears comfortable [de-identified] : EOMI; sclera anicteric. Nasal mucosa pink, septum midline. Oral mucosa pink. Tongue midline, Pharynx without exudates. [de-identified] : +ROM, no joint swelling [de-identified] : abd is soft, NT/ND\par  [de-identified] : Nodularity felt to the R. breast, posterior to areola, 11-12:00; No chest deformity, No asymmetry, Normal contours,No nodules, No masses, No axillary adenopathy, No nipple discharge,No tenderness\par

## 2020-07-20 NOTE — DATA REVIEWED
[FreeTextEntry1] : Date of Exam: 06-\par EXAM:  DIGITAL BILATERAL SCREENING MAMMOGRAM WITH CAD AND TOMOSYNTHESIS AND RIGHT \par BREAST ULTRASOUND\par \par HISTORY:  The patient is 65 years old and is seen for a short term follow-up. Family history of breast cancer: Mother diagnosed at an unknown age. \par CLINICAL BREAST EXAMINATION:  The patient does not state when she had her last clinical breast exam. \par \par COMPARISON:  The present examination has been compared to prior breast imaging studies dating back to 9/20/2019\par \par MAMMOGRAM:\par FINDINGS: \par BREAST COMPOSITION:  There are scattered areas of fibroglandular density.\par No suspicious masses, architectural distortion, or significant calcifications are detected.\par \par BREAST ULTRASOUND:\par HISTORY:  Supplemental screening evaluation of dense breasts.\par FINDINGS: \par The right 12 o'clock retroareolar region demonstrated a 4 x 4 x 5 mm intraductal mass and an adjacent 5 x 3 x 4 mm cyst. \par \par No suspicious solid or complex cystic mass is detected in either breast. There is no pathological lymphadenopathy in either axilla.\par \par IMPRESSION: Right retroareolar intraductal mass for which ultrasound-guided core biopsy is recommended. This was told the patient immediately after the examination. The patient has been scheduled for 6/29/2020.\par FOLLOW-UP:  Ultrasound guided biopsy.\par ASSESSMENT:  BI-RADS Category 4:  Suspicious.

## 2020-08-07 ENCOUNTER — OUTPATIENT (OUTPATIENT)
Dept: OUTPATIENT SERVICES | Facility: HOSPITAL | Age: 65
LOS: 1 days | End: 2020-08-07

## 2020-08-07 DIAGNOSIS — Z90.49 ACQUIRED ABSENCE OF OTHER SPECIFIED PARTS OF DIGESTIVE TRACT: Chronic | ICD-10-CM

## 2020-08-07 DIAGNOSIS — C50.911 MALIGNANT NEOPLASM OF UNSPECIFIED SITE OF RIGHT FEMALE BREAST: ICD-10-CM

## 2020-08-12 ENCOUNTER — OUTPATIENT (OUTPATIENT)
Dept: OUTPATIENT SERVICES | Facility: HOSPITAL | Age: 65
LOS: 1 days | End: 2020-08-12

## 2020-08-12 VITALS
OXYGEN SATURATION: 98 % | RESPIRATION RATE: 16 BRPM | SYSTOLIC BLOOD PRESSURE: 157 MMHG | HEIGHT: 63 IN | TEMPERATURE: 99 F | DIASTOLIC BLOOD PRESSURE: 88 MMHG | WEIGHT: 175.93 LBS | HEART RATE: 88 BPM

## 2020-08-12 DIAGNOSIS — I10 ESSENTIAL (PRIMARY) HYPERTENSION: ICD-10-CM

## 2020-08-12 DIAGNOSIS — J45.909 UNSPECIFIED ASTHMA, UNCOMPLICATED: ICD-10-CM

## 2020-08-12 DIAGNOSIS — Z98.891 HISTORY OF UTERINE SCAR FROM PREVIOUS SURGERY: Chronic | ICD-10-CM

## 2020-08-12 DIAGNOSIS — Z01.818 ENCOUNTER FOR OTHER PREPROCEDURAL EXAMINATION: ICD-10-CM

## 2020-08-12 DIAGNOSIS — E11.9 TYPE 2 DIABETES MELLITUS WITHOUT COMPLICATIONS: ICD-10-CM

## 2020-08-12 DIAGNOSIS — C50.911 MALIGNANT NEOPLASM OF UNSPECIFIED SITE OF RIGHT FEMALE BREAST: ICD-10-CM

## 2020-08-12 DIAGNOSIS — Z95.5 PRESENCE OF CORONARY ANGIOPLASTY IMPLANT AND GRAFT: ICD-10-CM

## 2020-08-12 DIAGNOSIS — E78.5 HYPERLIPIDEMIA, UNSPECIFIED: ICD-10-CM

## 2020-08-12 DIAGNOSIS — Z98.890 OTHER SPECIFIED POSTPROCEDURAL STATES: Chronic | ICD-10-CM

## 2020-08-12 DIAGNOSIS — Z90.49 ACQUIRED ABSENCE OF OTHER SPECIFIED PARTS OF DIGESTIVE TRACT: Chronic | ICD-10-CM

## 2020-08-12 RX ORDER — CHLORHEXIDINE GLUCONATE 213 G/1000ML
1 SOLUTION TOPICAL
Qty: 1 | Refills: 0
Start: 2020-08-12 | End: 2020-08-12

## 2020-08-12 RX ORDER — MONTELUKAST 4 MG/1
1 TABLET, CHEWABLE ORAL
Qty: 0 | Refills: 0 | DISCHARGE

## 2020-08-12 RX ORDER — METFORMIN HYDROCHLORIDE 850 MG/1
1 TABLET ORAL
Qty: 0 | Refills: 0 | DISCHARGE

## 2020-08-12 RX ORDER — GLYBURIDE 5 MG
1 TABLET ORAL
Qty: 0 | Refills: 0 | DISCHARGE

## 2020-08-12 NOTE — H&P PST ADULT - NEGATIVE NEUROLOGICAL SYMPTOMS
no syncope/no tremors/no loss of consciousness/no hemiparesis/no transient paralysis/no focal seizures/no loss of sensation/no difficulty walking/no paresthesias/no headache/no confusion/no weakness/no generalized seizures/no vertigo

## 2020-08-12 NOTE — H&P PST ADULT - NSICDXPASTSURGICALHX_GEN_ALL_CORE_FT
PAST SURGICAL HISTORY:  History of cholecystectomy     S/P cardiac catheterization NSTEMI with 2 coronary stents 2019    S/P  section 1986

## 2020-08-12 NOTE — H&P PST ADULT - NSANTHOSAYNRD_GEN_A_CORE
No. SHAHRIAR screening performed.  STOP BANG Legend: 0-2 = LOW Risk; 3-4 = INTERMEDIATE Risk; 5-8 = HIGH Risk

## 2020-08-12 NOTE — H&P PST ADULT - NEGATIVE GENERAL GENITOURINARY SYMPTOMS
no flank pain L/no dysuria/no incontinence/no hematuria/normal urinary frequency/no renal colic/no flank pain R/no urinary hesitancy

## 2020-08-12 NOTE — H&P PST ADULT - NSICDXPASTMEDICALHX_GEN_ALL_CORE_FT
PAST MEDICAL HISTORY:  Asthma last attack 3 moths ago, never intubated , never hospitalized    Cystocele with prolapse     DM (diabetes mellitus)     Exostosis of bone of ankle left    H/O cholecystitis     HLD (hyperlipidemia)     HTN (hypertension)     Non-ST elevation MI (NSTEMI) 11/14/2019 with 2 coronary stents placement, pt on Plavix and ASA 81 mg    Posterior heel bump left

## 2020-08-12 NOTE — H&P PST ADULT - NEGATIVE OPHTHALMOLOGIC SYMPTOMS
no blurred vision L/no blurred vision R/no lacrimation L/no lacrimation R/no photophobia/no diplopia

## 2020-08-12 NOTE — H&P PST ADULT - HISTORY OF PRESENT ILLNESS
65 years old female called to obtain info before surgery, patient was diagnosed with malignant neoplasm of unspecified site of right female breast and is scheduled for right breast lumpectomy with preop needle localization on 8/14/2020.

## 2020-08-12 NOTE — H&P PST ADULT - NSICDXPROBLEM_GEN_ALL_CORE_FT
PROBLEM DIAGNOSES  Problem: Malignant neoplasm of unspecified site of right female breast  Assessment and Plan: Patient  is scheduled for right breast lumpectomy with preop needle localization on 8/14/2020.     Problem: Coronary stent patent  Assessment and Plan: Patient on Plavix and ASA 81 mg , as per pt she stopped Plavix 5 days ago for sx and ASA 81 mg 10 days ago for previous bx, notified Dr. Gamez Anesthesia and Dr. Lopez Surgery, pt to start ASA 81 mg tonight  8/12/2020 and next dose tommorow evening 8/13/2020 , both doctors aware and ok with it , pt verbalized nderstanding , still hold Plavix. As per Dr. Gamez no need for cardiac evaluation, pt denies chest pain, palpitations, SMITH, can walk 5-6 blocks w/o SOB, climb 2 floors w/o SOB, echo 2/19/2020 EF- 45% , pt has medical clearance for surgery, optimized BP ,increased meds for better control of HTN    Problem: Asthma  Assessment and Plan: Continue use of inhalers and use same on day of surgery. Follow-up with PCP postop for management     Problem: Type 2 diabetes mellitus  Assessment and Plan: Continue antidiabetic meds and hold on day of surgery. Take 15 units of Detemir Insulin night before surgery. Perioperative glucose monitoring and cover as needed. Follow-up with PCP for diabetic management     Problem: Hypertension  Assessment and Plan: Continue antihypertensive meds and take with sips of water on day of surgery.  Cleared by PCP. Follow-up with PCP postop for management.     Problem: HLD (hyperlipidemia)  Assessment and Plan: Instructed pt to continue Atorvastatin. Follow-up with PCP postop for lipid management

## 2020-08-12 NOTE — H&P PST ADULT - RS GEN PE MLT RESP DETAILS PC
airway patent/no rales/respirations non-labored/clear to auscultation bilaterally/no wheezes/no rhonchi

## 2020-08-12 NOTE — H&P PST ADULT - NEGATIVE GASTROINTESTINAL SYMPTOMS
no flatulence/no constipation/no abdominal pain/no vomiting/no diarrhea/no nausea/no change in bowel habits

## 2020-08-12 NOTE — H&P PST ADULT - NEGATIVE BREAST SYMPTOMS
no breast lump L/no breast tenderness L/no nipple discharge L/no nipple discharge R/no breast tenderness R

## 2020-08-13 ENCOUNTER — TRANSCRIPTION ENCOUNTER (OUTPATIENT)
Age: 65
End: 2020-08-13

## 2020-08-14 ENCOUNTER — RESULT REVIEW (OUTPATIENT)
Age: 65
End: 2020-08-14

## 2020-08-14 ENCOUNTER — APPOINTMENT (OUTPATIENT)
Dept: SURGERY | Facility: HOSPITAL | Age: 65
End: 2020-08-14

## 2020-08-14 ENCOUNTER — OUTPATIENT (OUTPATIENT)
Dept: OUTPATIENT SERVICES | Facility: HOSPITAL | Age: 65
LOS: 1 days | End: 2020-08-14
Payer: COMMERCIAL

## 2020-08-14 VITALS
HEART RATE: 88 BPM | OXYGEN SATURATION: 98 % | TEMPERATURE: 99 F | SYSTOLIC BLOOD PRESSURE: 157 MMHG | RESPIRATION RATE: 16 BRPM | HEIGHT: 63 IN | DIASTOLIC BLOOD PRESSURE: 88 MMHG | WEIGHT: 175.93 LBS

## 2020-08-14 VITALS
SYSTOLIC BLOOD PRESSURE: 140 MMHG | OXYGEN SATURATION: 96 % | HEART RATE: 79 BPM | TEMPERATURE: 98 F | RESPIRATION RATE: 15 BRPM | DIASTOLIC BLOOD PRESSURE: 70 MMHG

## 2020-08-14 DIAGNOSIS — Z98.890 OTHER SPECIFIED POSTPROCEDURAL STATES: Chronic | ICD-10-CM

## 2020-08-14 DIAGNOSIS — C50.911 MALIGNANT NEOPLASM OF UNSPECIFIED SITE OF RIGHT FEMALE BREAST: ICD-10-CM

## 2020-08-14 DIAGNOSIS — Z90.49 ACQUIRED ABSENCE OF OTHER SPECIFIED PARTS OF DIGESTIVE TRACT: Chronic | ICD-10-CM

## 2020-08-14 DIAGNOSIS — Z98.891 HISTORY OF UTERINE SCAR FROM PREVIOUS SURGERY: Chronic | ICD-10-CM

## 2020-08-14 LAB
GLUCOSE BLDC GLUCOMTR-MCNC: 122 MG/DL — HIGH (ref 70–99)
GLUCOSE BLDC GLUCOMTR-MCNC: 79 MG/DL — SIGNIFICANT CHANGE UP (ref 70–99)

## 2020-08-14 PROCEDURE — 88305 TISSUE EXAM BY PATHOLOGIST: CPT | Mod: 26

## 2020-08-14 PROCEDURE — 88341 IMHCHEM/IMCYTCHM EA ADD ANTB: CPT | Mod: 26

## 2020-08-14 PROCEDURE — 88342 IMHCHEM/IMCYTCHM 1ST ANTB: CPT

## 2020-08-14 PROCEDURE — 88305 TISSUE EXAM BY PATHOLOGIST: CPT

## 2020-08-14 PROCEDURE — 88342 IMHCHEM/IMCYTCHM 1ST ANTB: CPT | Mod: 26

## 2020-08-14 PROCEDURE — 88307 TISSUE EXAM BY PATHOLOGIST: CPT

## 2020-08-14 PROCEDURE — 19281 PERQ DEVICE BREAST 1ST IMAG: CPT

## 2020-08-14 PROCEDURE — 82962 GLUCOSE BLOOD TEST: CPT

## 2020-08-14 PROCEDURE — 88307 TISSUE EXAM BY PATHOLOGIST: CPT | Mod: 26

## 2020-08-14 PROCEDURE — 19281 PERQ DEVICE BREAST 1ST IMAG: CPT | Mod: RT

## 2020-08-14 PROCEDURE — 76098 X-RAY EXAM SURGICAL SPECIMEN: CPT

## 2020-08-14 PROCEDURE — 19301 PARTIAL MASTECTOMY: CPT | Mod: AS,RT

## 2020-08-14 PROCEDURE — 19301 PARTIAL MASTECTOMY: CPT | Mod: RT

## 2020-08-14 PROCEDURE — 76098 X-RAY EXAM SURGICAL SPECIMEN: CPT | Mod: 26

## 2020-08-14 PROCEDURE — 88341 IMHCHEM/IMCYTCHM EA ADD ANTB: CPT

## 2020-08-14 RX ORDER — HYDROMORPHONE HYDROCHLORIDE 2 MG/ML
0.5 INJECTION INTRAMUSCULAR; INTRAVENOUS; SUBCUTANEOUS
Refills: 0 | Status: DISCONTINUED | OUTPATIENT
Start: 2020-08-14 | End: 2020-08-14

## 2020-08-14 RX ORDER — SODIUM CHLORIDE 9 MG/ML
3 INJECTION INTRAMUSCULAR; INTRAVENOUS; SUBCUTANEOUS EVERY 8 HOURS
Refills: 0 | Status: DISCONTINUED | OUTPATIENT
Start: 2020-08-14 | End: 2020-08-14

## 2020-08-14 RX ORDER — HYDROMORPHONE HYDROCHLORIDE 2 MG/ML
1 INJECTION INTRAMUSCULAR; INTRAVENOUS; SUBCUTANEOUS
Refills: 0 | Status: DISCONTINUED | OUTPATIENT
Start: 2020-08-14 | End: 2020-08-14

## 2020-08-14 RX ORDER — TRAMADOL HYDROCHLORIDE 50 MG/1
1 TABLET ORAL
Qty: 12 | Refills: 0
Start: 2020-08-14

## 2020-08-14 RX ORDER — SODIUM CHLORIDE 9 MG/ML
1000 INJECTION, SOLUTION INTRAVENOUS
Refills: 0 | Status: DISCONTINUED | OUTPATIENT
Start: 2020-08-14 | End: 2020-08-14

## 2020-08-14 RX ADMIN — SODIUM CHLORIDE 3 MILLILITER(S): 9 INJECTION INTRAMUSCULAR; INTRAVENOUS; SUBCUTANEOUS at 10:26

## 2020-08-14 NOTE — ASU DISCHARGE PLAN (ADULT/PEDIATRIC) - ASU DC SPECIAL INSTRUCTIONSFT
- can remove dressing in 48 hours  - do not take steri-strips off, allow them to fall off on their own  - take over the counter medications for pain, tylenol or ibuprofen, and prescribed medication only for severe pain  - shower as necessary, do not soak or bathe until cleared by surgeon  - follow up with surgeon in 1-2 weeks, call to schedule an appointment

## 2020-08-14 NOTE — ASU PATIENT PROFILE, ADULT - AS SC BRADEN ACTIVITY
89yo  M w/ no PPH, PMH Diabetes, s/p unwitnessed fall with TBI, Traumatic SAH stable, SDH. Psychiatry consulted for delirium and visual hallucination
(4) walks frequently

## 2020-08-14 NOTE — BRIEF OPERATIVE NOTE - NSICDXBRIEFPROCEDURE_GEN_ALL_CORE_FT
PROCEDURES:  Lumpectomy of right breast after needle localization 14-Aug-2020 14:14:49  Patricia Bejarano Statement Selected

## 2020-08-14 NOTE — BRIEF OPERATIVE NOTE - OPERATION/FINDINGS
R breast lumpectomy  specimen clip intact confirmed per radiology  subareolar margin negative per pathology

## 2020-08-14 NOTE — ASU DISCHARGE PLAN (ADULT/PEDIATRIC) - CALL YOUR DOCTOR IF YOU HAVE ANY OF THE FOLLOWING:
Wound/Surgical Site with redness, or foul smelling discharge or pus/Bleeding that does not stop Wound/Surgical Site with redness, or foul smelling discharge or pus/Bleeding that does not stop/Fever greater than (need to indicate Fahrenheit or Celsius)/Nausea and vomiting that does not stop

## 2020-08-14 NOTE — ASU PATIENT PROFILE, ADULT - NS TRANSFER DENTURES
Spoke to pt. Pt stated that she received the message. She reports some improvement with the cramping. She stated that she completed her morning walk today.  U/a and urine culture scheduled for today.   Upper/Partial

## 2020-08-14 NOTE — ASU DISCHARGE PLAN (ADULT/PEDIATRIC) - CARE PROVIDER_API CALL
Good Lopez (MD)  Surgery  [address] [place] [id number]  Phone: [phone]  Fax: [phone]  Follow Up Time: 2 weeks

## 2020-08-20 PROBLEM — M89.8X7 OTHER SPECIFIED DISORDERS OF BONE, ANKLE AND FOOT: Chronic | Status: ACTIVE | Noted: 2020-08-12

## 2020-08-20 PROBLEM — J45.909 UNSPECIFIED ASTHMA, UNCOMPLICATED: Chronic | Status: ACTIVE | Noted: 2019-11-14

## 2020-08-20 PROBLEM — R22.40 LOCALIZED SWELLING, MASS AND LUMP, UNSPECIFIED LOWER LIMB: Chronic | Status: ACTIVE | Noted: 2020-08-12

## 2020-08-20 PROBLEM — N81.4 UTEROVAGINAL PROLAPSE, UNSPECIFIED: Chronic | Status: ACTIVE | Noted: 2020-08-12

## 2020-08-20 PROBLEM — Z87.19 PERSONAL HISTORY OF OTHER DISEASES OF THE DIGESTIVE SYSTEM: Chronic | Status: ACTIVE | Noted: 2020-08-12

## 2020-08-20 PROBLEM — I21.4 NON-ST ELEVATION (NSTEMI) MYOCARDIAL INFARCTION: Chronic | Status: ACTIVE | Noted: 2020-08-12

## 2020-08-21 NOTE — H&P ADULT - LYMPHATIC
119 supraclavicular R/posterior cervical R/anterior cervical R/supraclavicular L/posterior cervical L/anterior cervical L

## 2020-08-25 LAB — SURGICAL PATHOLOGY STUDY: SIGNIFICANT CHANGE UP

## 2020-08-27 ENCOUNTER — APPOINTMENT (OUTPATIENT)
Dept: SURGERY | Facility: CLINIC | Age: 65
End: 2020-08-27
Payer: COMMERCIAL

## 2020-08-27 PROCEDURE — 99024 POSTOP FOLLOW-UP VISIT: CPT

## 2020-08-27 NOTE — HISTORY OF PRESENT ILLNESS
[de-identified] : SATINDER KELLY with DCIS of R. breast , presents to the office for postoperative visit today, she is s/p preoperative needle localization and right breast lumpectomy, 08/14/20. \par Patient is without reported complaints. No evidence of infection/inflammation. Incision site healing well. No fevers/chills .\par \par

## 2020-08-27 NOTE — REASON FOR VISIT
[Post Op: _________] : a [unfilled] post op visit [FreeTextEntry1] : s/p preoperative needle localization and right breast lumpectomy, 08/14/20.

## 2020-08-27 NOTE — PHYSICAL EXAM
[Normal Breath Sounds] : Normal breath sounds [Normal Rate and Rhythm] : normal rate and rhythm [No Rash or Lesion] : No rash or lesion [Oriented to Person] : oriented to person [Alert] : alert [Oriented to Place] : oriented to place [Oriented to Time] : oriented to time [Calm] : calm [JVD] : no jugular venous distention  [de-identified] : A/Ox3; NAD. appears comfortable [de-identified] : wound healing well with no seroma, drainage or erythema. No infection noted. \par

## 2020-08-27 NOTE — PLAN
[FreeTextEntry1] : called to schedule appointment with oncology, Dr. Morton, 08/31\par will call patient to follow up with results after speaking w pathologist \par \par return to the office for F/U visit in 3 months/sooner if there are any problems

## 2020-08-27 NOTE — ASSESSMENT
[FreeTextEntry1] : SATINDER KELLY with DCIS of R. breast , presents to the office for postoperative visit today, she is s/p preoperative needle localization and right breast lumpectomy, 08/14/20. \par \par Incision site is healing well and as expected. There is no evidence of infection/complication, and patient is progressing as expected. Post-operative wound care, activities, restrictions and precautions were reinforced. Pathology results were discussed in detail. Will also follow up w pathologist and call patient w final results. \par \par Patient's questions and concerns addressed to patient's satisfaction.\par

## 2020-08-27 NOTE — CONSULT LETTER
[Dear  ___] : Dear  [unfilled], [Consult Letter:] : I had the pleasure of evaluating your patient, [unfilled]. [Consult Closing:] : Thank you very much for allowing me to participate in the care of this patient.  If you have any questions, please do not hesitate to contact me. [Sincerely,] : Sincerely, [FreeTextEntry3] : Good Lopez MD\par

## 2021-08-14 NOTE — DISCHARGE NOTE PROVIDER - NSDCMRMEDTOKEN_GEN_ALL_CORE_FT
amLODIPine 5 mg oral tablet: 1 tab(s) orally once a day  atorvastatin 20 mg oral tablet: 1 tab(s) orally once a day  glyBURIDE 5 mg oral tablet: 1 tab(s) orally 2 times a day  hydroCHLOROthiazide 25 mg oral tablet: 1 tab(s) orally once a day  lisinopril 5 mg oral tablet: 1 tab(s) orally once a day  metFORMIN 500 mg oral tablet: 1 tab(s) orally 2 times a day  montelukast 10 mg oral tablet: 1 tab(s) orally once a day FAMILY HISTORY:  Father  Still living? Unknown  Family history of heart disease, Age at diagnosis: Age Unknown     amLODIPine 5 mg oral tablet: 1 tab(s) orally once a day  aspirin 81 mg oral delayed release tablet: 1 tab(s) orally once a day  atorvastatin 80 mg oral tablet: 1 tab(s) orally once a day  glyBURIDE 5 mg oral tablet: 1 tab(s) orally 2 times a day  hydroCHLOROthiazide 25 mg oral tablet: 1 tab(s) orally once a day  lisinopril 5 mg oral tablet: 1 tab(s) orally once a day  metFORMIN 500 mg oral tablet: 1 tab(s) orally 2 times a day  metoprolol: 12.5 milligram(s) orally 2 times a day  montelukast 10 mg oral tablet: 1 tab(s) orally once a day  pantoprazole 40 mg oral delayed release tablet: 1 tab(s) orally once a day (before a meal)  ticagrelor 90 mg oral tablet: 1 tab(s) orally every 12 hours aspirin 81 mg oral delayed release tablet: 1 tab(s) orally once a day  atorvastatin 80 mg oral tablet: 1 tab(s) orally once a day  glyBURIDE 5 mg oral tablet: 1 tab(s) orally 2 times a day  hydroCHLOROthiazide 25 mg oral tablet: 1 tab(s) orally once a day  insulin glargine: 15 unit(s) subcutaneous once a day (at bedtime)  lisinopril 5 mg oral tablet: 1 tab(s) orally once a day  metFORMIN 500 mg oral tablet: 1 tab(s) orally 2 times a day  metoprolol tartrate 25 mg oral tablet: 0.5 tab(s) orally 2 times a day   montelukast 10 mg oral tablet: 1 tab(s) orally once a day  pantoprazole 40 mg oral delayed release tablet: 1 tab(s) orally once a day (before a meal)  ticagrelor 90 mg oral tablet: 1 tab(s) orally every 12 hours aspirin 81 mg oral delayed release tablet: 1 tab(s) orally once a day  atorvastatin 80 mg oral tablet: 1 tab(s) orally once a day  clopidogrel 75 mg oral tablet: 1 tab(s) orally once a day (start on 11/19/19)  glyBURIDE 5 mg oral tablet: 1 tab(s) orally 2 times a day  hydroCHLOROthiazide 25 mg oral tablet: 1 tab(s) orally once a day  insulin glargine: 15 unit(s) subcutaneous once a day (at bedtime)  lisinopril 5 mg oral tablet: 1 tab(s) orally once a day  metFORMIN 500 mg oral tablet: 1 tab(s) orally 2 times a day  metoprolol tartrate 25 mg oral tablet: 0.5 tab(s) orally 2 times a day   montelukast 10 mg oral tablet: 1 tab(s) orally once a day  omeprazole 20 mg oral delayed release tablet: 1 tab(s) orally once a day   Plavix 300 mg oral tablet: 2 tab(s) orally once a day (Take on 11/18/19 AM) aspirin 81 mg oral delayed release tablet: 1 tab(s) orally once a day  atorvastatin 80 mg oral tablet: 1 tab(s) orally once a day  clopidogrel 75 mg oral tablet: 1 tab(s) orally once a day (start on 11/19/19)  glyBURIDE 5 mg oral tablet: 1 tab(s) orally 2 times a day  hydroCHLOROthiazide 25 mg oral tablet: 1 tab(s) orally once a day  insulin glargine: 15 unit(s) subcutaneous once a day (at bedtime)  lisinopril 5 mg oral tablet: 1 tab(s) orally once a day  metFORMIN 500 mg oral tablet: 1 tab(s) orally 2 times a day  metoprolol tartrate 25 mg oral tablet: 0.5 tab(s) orally 2 times a day   montelukast 10 mg oral tablet: 1 tab(s) orally once a day  omeprazole 20 mg oral delayed release tablet: 1 tab(s) orally once a day   Plavix 300 mg oral tablet: 2 tab(s) orally once  (Take on 11/18/19 AM)

## 2023-11-10 ENCOUNTER — INPATIENT (INPATIENT)
Facility: HOSPITAL | Age: 68
LOS: 3 days | Discharge: HOME | End: 2023-11-14
Attending: HOSPITALIST | Admitting: HOSPITALIST
Payer: COMMERCIAL

## 2023-11-10 VITALS — WEIGHT: 175.05 LBS

## 2023-11-10 DIAGNOSIS — J45.909 UNSPECIFIED ASTHMA, UNCOMPLICATED: ICD-10-CM

## 2023-11-10 DIAGNOSIS — Z98.890 OTHER SPECIFIED POSTPROCEDURAL STATES: Chronic | ICD-10-CM

## 2023-11-10 DIAGNOSIS — Z98.891 HISTORY OF UTERINE SCAR FROM PREVIOUS SURGERY: Chronic | ICD-10-CM

## 2023-11-10 DIAGNOSIS — E11.9 TYPE 2 DIABETES MELLITUS WITHOUT COMPLICATIONS: ICD-10-CM

## 2023-11-10 DIAGNOSIS — I25.10 ATHEROSCLEROTIC HEART DISEASE OF NATIVE CORONARY ARTERY WITHOUT ANGINA PECTORIS: ICD-10-CM

## 2023-11-10 DIAGNOSIS — I50.9 HEART FAILURE, UNSPECIFIED: ICD-10-CM

## 2023-11-10 DIAGNOSIS — I10 ESSENTIAL (PRIMARY) HYPERTENSION: ICD-10-CM

## 2023-11-10 DIAGNOSIS — Z29.9 ENCOUNTER FOR PROPHYLACTIC MEASURES, UNSPECIFIED: ICD-10-CM

## 2023-11-10 DIAGNOSIS — J96.01 ACUTE RESPIRATORY FAILURE WITH HYPOXIA: ICD-10-CM

## 2023-11-10 DIAGNOSIS — Z90.49 ACQUIRED ABSENCE OF OTHER SPECIFIED PARTS OF DIGESTIVE TRACT: Chronic | ICD-10-CM

## 2023-11-10 LAB
ALBUMIN SERPL ELPH-MCNC: 4.2 G/DL — SIGNIFICANT CHANGE UP (ref 3.3–5)
ALBUMIN SERPL ELPH-MCNC: 4.2 G/DL — SIGNIFICANT CHANGE UP (ref 3.3–5)
ALP SERPL-CCNC: 64 U/L — SIGNIFICANT CHANGE UP (ref 40–120)
ALP SERPL-CCNC: 64 U/L — SIGNIFICANT CHANGE UP (ref 40–120)
ALT FLD-CCNC: 20 U/L — SIGNIFICANT CHANGE UP (ref 4–33)
ALT FLD-CCNC: 20 U/L — SIGNIFICANT CHANGE UP (ref 4–33)
ANION GAP SERPL CALC-SCNC: 16 MMOL/L — HIGH (ref 7–14)
APTT BLD: 30.7 SEC — SIGNIFICANT CHANGE UP (ref 24.5–35.6)
APTT BLD: 30.7 SEC — SIGNIFICANT CHANGE UP (ref 24.5–35.6)
AST SERPL-CCNC: 22 U/L — SIGNIFICANT CHANGE UP (ref 4–32)
AST SERPL-CCNC: 22 U/L — SIGNIFICANT CHANGE UP (ref 4–32)
B PERT DNA SPEC QL NAA+PROBE: SIGNIFICANT CHANGE UP
B PERT DNA SPEC QL NAA+PROBE: SIGNIFICANT CHANGE UP
B PERT+PARAPERT DNA PNL SPEC NAA+PROBE: SIGNIFICANT CHANGE UP
B PERT+PARAPERT DNA PNL SPEC NAA+PROBE: SIGNIFICANT CHANGE UP
B-OH-BUTYR SERPL-SCNC: 0.2 MMOL/L — SIGNIFICANT CHANGE UP (ref 0–0.4)
B-OH-BUTYR SERPL-SCNC: 0.2 MMOL/L — SIGNIFICANT CHANGE UP (ref 0–0.4)
BASE EXCESS BLDV CALC-SCNC: -4.1 MMOL/L — LOW (ref -2–3)
BASE EXCESS BLDV CALC-SCNC: -4.1 MMOL/L — LOW (ref -2–3)
BASE EXCESS BLDV CALC-SCNC: -5.4 MMOL/L — LOW (ref -2–3)
BASE EXCESS BLDV CALC-SCNC: -5.4 MMOL/L — LOW (ref -2–3)
BASOPHILS # BLD AUTO: 0.05 K/UL — SIGNIFICANT CHANGE UP (ref 0–0.2)
BASOPHILS # BLD AUTO: 0.05 K/UL — SIGNIFICANT CHANGE UP (ref 0–0.2)
BASOPHILS NFR BLD AUTO: 0.3 % — SIGNIFICANT CHANGE UP (ref 0–2)
BASOPHILS NFR BLD AUTO: 0.3 % — SIGNIFICANT CHANGE UP (ref 0–2)
BILIRUB SERPL-MCNC: 0.5 MG/DL — SIGNIFICANT CHANGE UP (ref 0.2–1.2)
BILIRUB SERPL-MCNC: 0.5 MG/DL — SIGNIFICANT CHANGE UP (ref 0.2–1.2)
BLOOD GAS VENOUS COMPREHENSIVE RESULT: SIGNIFICANT CHANGE UP
BORDETELLA PARAPERTUSSIS (RAPRVP): SIGNIFICANT CHANGE UP
BORDETELLA PARAPERTUSSIS (RAPRVP): SIGNIFICANT CHANGE UP
BUN SERPL-MCNC: 16 MG/DL — SIGNIFICANT CHANGE UP (ref 7–23)
BUN SERPL-MCNC: 16 MG/DL — SIGNIFICANT CHANGE UP (ref 7–23)
BUN SERPL-MCNC: 19 MG/DL — SIGNIFICANT CHANGE UP (ref 7–23)
BUN SERPL-MCNC: 19 MG/DL — SIGNIFICANT CHANGE UP (ref 7–23)
C PNEUM DNA SPEC QL NAA+PROBE: SIGNIFICANT CHANGE UP
C PNEUM DNA SPEC QL NAA+PROBE: SIGNIFICANT CHANGE UP
CALCIUM SERPL-MCNC: 9.1 MG/DL — SIGNIFICANT CHANGE UP (ref 8.4–10.5)
CALCIUM SERPL-MCNC: 9.1 MG/DL — SIGNIFICANT CHANGE UP (ref 8.4–10.5)
CALCIUM SERPL-MCNC: 9.2 MG/DL — SIGNIFICANT CHANGE UP (ref 8.4–10.5)
CALCIUM SERPL-MCNC: 9.2 MG/DL — SIGNIFICANT CHANGE UP (ref 8.4–10.5)
CHLORIDE BLDV-SCNC: 100 MMOL/L — SIGNIFICANT CHANGE UP (ref 96–108)
CHLORIDE BLDV-SCNC: 100 MMOL/L — SIGNIFICANT CHANGE UP (ref 96–108)
CHLORIDE BLDV-SCNC: 103 MMOL/L — SIGNIFICANT CHANGE UP (ref 96–108)
CHLORIDE BLDV-SCNC: 103 MMOL/L — SIGNIFICANT CHANGE UP (ref 96–108)
CHLORIDE SERPL-SCNC: 100 MMOL/L — SIGNIFICANT CHANGE UP (ref 98–107)
CHLORIDE SERPL-SCNC: 100 MMOL/L — SIGNIFICANT CHANGE UP (ref 98–107)
CHLORIDE SERPL-SCNC: 102 MMOL/L — SIGNIFICANT CHANGE UP (ref 98–107)
CHLORIDE SERPL-SCNC: 102 MMOL/L — SIGNIFICANT CHANGE UP (ref 98–107)
CK MB BLD-MCNC: 2.6 % — HIGH (ref 0–2.5)
CK MB BLD-MCNC: 2.6 % — HIGH (ref 0–2.5)
CK MB CFR SERPL CALC: 4.9 NG/ML — HIGH
CK MB CFR SERPL CALC: 4.9 NG/ML — HIGH
CK MB CFR SERPL CALC: 7 NG/ML — HIGH
CK MB CFR SERPL CALC: 7 NG/ML — HIGH
CK SERPL-CCNC: 190 U/L — HIGH (ref 25–170)
CK SERPL-CCNC: 190 U/L — HIGH (ref 25–170)
CO2 BLDV-SCNC: 24.5 MMOL/L — SIGNIFICANT CHANGE UP (ref 22–26)
CO2 BLDV-SCNC: 24.5 MMOL/L — SIGNIFICANT CHANGE UP (ref 22–26)
CO2 BLDV-SCNC: 27.1 MMOL/L — HIGH (ref 22–26)
CO2 BLDV-SCNC: 27.1 MMOL/L — HIGH (ref 22–26)
CO2 SERPL-SCNC: 20 MMOL/L — LOW (ref 22–31)
CREAT SERPL-MCNC: 0.81 MG/DL — SIGNIFICANT CHANGE UP (ref 0.5–1.3)
CREAT SERPL-MCNC: 0.81 MG/DL — SIGNIFICANT CHANGE UP (ref 0.5–1.3)
CREAT SERPL-MCNC: 1.06 MG/DL — SIGNIFICANT CHANGE UP (ref 0.5–1.3)
CREAT SERPL-MCNC: 1.06 MG/DL — SIGNIFICANT CHANGE UP (ref 0.5–1.3)
EGFR: 58 ML/MIN/1.73M2 — LOW
EGFR: 58 ML/MIN/1.73M2 — LOW
EGFR: 80 ML/MIN/1.73M2 — SIGNIFICANT CHANGE UP
EGFR: 80 ML/MIN/1.73M2 — SIGNIFICANT CHANGE UP
EOSINOPHIL # BLD AUTO: 0.26 K/UL — SIGNIFICANT CHANGE UP (ref 0–0.5)
EOSINOPHIL # BLD AUTO: 0.26 K/UL — SIGNIFICANT CHANGE UP (ref 0–0.5)
EOSINOPHIL NFR BLD AUTO: 1.3 % — SIGNIFICANT CHANGE UP (ref 0–6)
EOSINOPHIL NFR BLD AUTO: 1.3 % — SIGNIFICANT CHANGE UP (ref 0–6)
FLUAV SUBTYP SPEC NAA+PROBE: SIGNIFICANT CHANGE UP
FLUAV SUBTYP SPEC NAA+PROBE: SIGNIFICANT CHANGE UP
FLUBV RNA SPEC QL NAA+PROBE: SIGNIFICANT CHANGE UP
FLUBV RNA SPEC QL NAA+PROBE: SIGNIFICANT CHANGE UP
GAS PNL BLDV: 129 MMOL/L — LOW (ref 136–145)
GAS PNL BLDV: 129 MMOL/L — LOW (ref 136–145)
GAS PNL BLDV: 133 MMOL/L — LOW (ref 136–145)
GAS PNL BLDV: 133 MMOL/L — LOW (ref 136–145)
GAS PNL BLDV: SIGNIFICANT CHANGE UP
GAS PNL BLDV: SIGNIFICANT CHANGE UP
GLUCOSE BLDC GLUCOMTR-MCNC: 110 MG/DL — HIGH (ref 70–99)
GLUCOSE BLDC GLUCOMTR-MCNC: 110 MG/DL — HIGH (ref 70–99)
GLUCOSE BLDC GLUCOMTR-MCNC: 128 MG/DL — HIGH (ref 70–99)
GLUCOSE BLDC GLUCOMTR-MCNC: 128 MG/DL — HIGH (ref 70–99)
GLUCOSE BLDC GLUCOMTR-MCNC: 302 MG/DL — HIGH (ref 70–99)
GLUCOSE BLDC GLUCOMTR-MCNC: 302 MG/DL — HIGH (ref 70–99)
GLUCOSE BLDV-MCNC: 260 MG/DL — HIGH (ref 70–99)
GLUCOSE BLDV-MCNC: 260 MG/DL — HIGH (ref 70–99)
GLUCOSE BLDV-MCNC: 375 MG/DL — HIGH (ref 70–99)
GLUCOSE BLDV-MCNC: 375 MG/DL — HIGH (ref 70–99)
GLUCOSE SERPL-MCNC: 116 MG/DL — HIGH (ref 70–99)
GLUCOSE SERPL-MCNC: 116 MG/DL — HIGH (ref 70–99)
GLUCOSE SERPL-MCNC: 340 MG/DL — HIGH (ref 70–99)
GLUCOSE SERPL-MCNC: 340 MG/DL — HIGH (ref 70–99)
HADV DNA SPEC QL NAA+PROBE: SIGNIFICANT CHANGE UP
HADV DNA SPEC QL NAA+PROBE: SIGNIFICANT CHANGE UP
HCO3 BLDV-SCNC: 23 MMOL/L — SIGNIFICANT CHANGE UP (ref 22–29)
HCO3 BLDV-SCNC: 23 MMOL/L — SIGNIFICANT CHANGE UP (ref 22–29)
HCO3 BLDV-SCNC: 25 MMOL/L — SIGNIFICANT CHANGE UP (ref 22–29)
HCO3 BLDV-SCNC: 25 MMOL/L — SIGNIFICANT CHANGE UP (ref 22–29)
HCOV 229E RNA SPEC QL NAA+PROBE: SIGNIFICANT CHANGE UP
HCOV 229E RNA SPEC QL NAA+PROBE: SIGNIFICANT CHANGE UP
HCOV HKU1 RNA SPEC QL NAA+PROBE: SIGNIFICANT CHANGE UP
HCOV HKU1 RNA SPEC QL NAA+PROBE: SIGNIFICANT CHANGE UP
HCOV NL63 RNA SPEC QL NAA+PROBE: SIGNIFICANT CHANGE UP
HCOV NL63 RNA SPEC QL NAA+PROBE: SIGNIFICANT CHANGE UP
HCOV OC43 RNA SPEC QL NAA+PROBE: SIGNIFICANT CHANGE UP
HCOV OC43 RNA SPEC QL NAA+PROBE: SIGNIFICANT CHANGE UP
HCT VFR BLD CALC: 46.2 % — HIGH (ref 34.5–45)
HCT VFR BLD CALC: 46.2 % — HIGH (ref 34.5–45)
HCT VFR BLDA CALC: 42 % — SIGNIFICANT CHANGE UP (ref 34.5–46.5)
HCT VFR BLDA CALC: 42 % — SIGNIFICANT CHANGE UP (ref 34.5–46.5)
HCT VFR BLDA CALC: 46 % — SIGNIFICANT CHANGE UP (ref 34.5–46.5)
HCT VFR BLDA CALC: 46 % — SIGNIFICANT CHANGE UP (ref 34.5–46.5)
HGB BLD CALC-MCNC: 14.1 G/DL — SIGNIFICANT CHANGE UP (ref 11.7–16.1)
HGB BLD CALC-MCNC: 14.1 G/DL — SIGNIFICANT CHANGE UP (ref 11.7–16.1)
HGB BLD CALC-MCNC: 15.2 G/DL — SIGNIFICANT CHANGE UP (ref 11.7–16.1)
HGB BLD CALC-MCNC: 15.2 G/DL — SIGNIFICANT CHANGE UP (ref 11.7–16.1)
HGB BLD-MCNC: 15 G/DL — SIGNIFICANT CHANGE UP (ref 11.5–15.5)
HGB BLD-MCNC: 15 G/DL — SIGNIFICANT CHANGE UP (ref 11.5–15.5)
HMPV RNA SPEC QL NAA+PROBE: SIGNIFICANT CHANGE UP
HMPV RNA SPEC QL NAA+PROBE: SIGNIFICANT CHANGE UP
HPIV1 RNA SPEC QL NAA+PROBE: SIGNIFICANT CHANGE UP
HPIV1 RNA SPEC QL NAA+PROBE: SIGNIFICANT CHANGE UP
HPIV2 RNA SPEC QL NAA+PROBE: SIGNIFICANT CHANGE UP
HPIV2 RNA SPEC QL NAA+PROBE: SIGNIFICANT CHANGE UP
HPIV3 RNA SPEC QL NAA+PROBE: SIGNIFICANT CHANGE UP
HPIV3 RNA SPEC QL NAA+PROBE: SIGNIFICANT CHANGE UP
HPIV4 RNA SPEC QL NAA+PROBE: SIGNIFICANT CHANGE UP
HPIV4 RNA SPEC QL NAA+PROBE: SIGNIFICANT CHANGE UP
IANC: 15.4 K/UL — HIGH (ref 1.8–7.4)
IANC: 15.4 K/UL — HIGH (ref 1.8–7.4)
IMM GRANULOCYTES NFR BLD AUTO: 0.4 % — SIGNIFICANT CHANGE UP (ref 0–0.9)
IMM GRANULOCYTES NFR BLD AUTO: 0.4 % — SIGNIFICANT CHANGE UP (ref 0–0.9)
INR BLD: 0.93 RATIO — SIGNIFICANT CHANGE UP (ref 0.85–1.18)
INR BLD: 0.93 RATIO — SIGNIFICANT CHANGE UP (ref 0.85–1.18)
LACTATE BLDV-MCNC: 3.1 MMOL/L — HIGH (ref 0.5–2)
LACTATE BLDV-MCNC: 3.1 MMOL/L — HIGH (ref 0.5–2)
LACTATE BLDV-MCNC: 3.9 MMOL/L — HIGH (ref 0.5–2)
LACTATE BLDV-MCNC: 3.9 MMOL/L — HIGH (ref 0.5–2)
LYMPHOCYTES # BLD AUTO: 15.6 % — SIGNIFICANT CHANGE UP (ref 13–44)
LYMPHOCYTES # BLD AUTO: 15.6 % — SIGNIFICANT CHANGE UP (ref 13–44)
LYMPHOCYTES # BLD AUTO: 3.09 K/UL — SIGNIFICANT CHANGE UP (ref 1–3.3)
LYMPHOCYTES # BLD AUTO: 3.09 K/UL — SIGNIFICANT CHANGE UP (ref 1–3.3)
M PNEUMO DNA SPEC QL NAA+PROBE: SIGNIFICANT CHANGE UP
M PNEUMO DNA SPEC QL NAA+PROBE: SIGNIFICANT CHANGE UP
MAGNESIUM SERPL-MCNC: 1.6 MG/DL — SIGNIFICANT CHANGE UP (ref 1.6–2.6)
MAGNESIUM SERPL-MCNC: 1.6 MG/DL — SIGNIFICANT CHANGE UP (ref 1.6–2.6)
MCHC RBC-ENTMCNC: 29.1 PG — SIGNIFICANT CHANGE UP (ref 27–34)
MCHC RBC-ENTMCNC: 29.1 PG — SIGNIFICANT CHANGE UP (ref 27–34)
MCHC RBC-ENTMCNC: 32.5 GM/DL — SIGNIFICANT CHANGE UP (ref 32–36)
MCHC RBC-ENTMCNC: 32.5 GM/DL — SIGNIFICANT CHANGE UP (ref 32–36)
MCV RBC AUTO: 89.7 FL — SIGNIFICANT CHANGE UP (ref 80–100)
MCV RBC AUTO: 89.7 FL — SIGNIFICANT CHANGE UP (ref 80–100)
MONOCYTES # BLD AUTO: 0.93 K/UL — HIGH (ref 0–0.9)
MONOCYTES # BLD AUTO: 0.93 K/UL — HIGH (ref 0–0.9)
MONOCYTES NFR BLD AUTO: 4.7 % — SIGNIFICANT CHANGE UP (ref 2–14)
MONOCYTES NFR BLD AUTO: 4.7 % — SIGNIFICANT CHANGE UP (ref 2–14)
NEUTROPHILS # BLD AUTO: 15.4 K/UL — HIGH (ref 1.8–7.4)
NEUTROPHILS # BLD AUTO: 15.4 K/UL — HIGH (ref 1.8–7.4)
NEUTROPHILS NFR BLD AUTO: 77.7 % — HIGH (ref 43–77)
NEUTROPHILS NFR BLD AUTO: 77.7 % — HIGH (ref 43–77)
NRBC # BLD: 0 /100 WBCS — SIGNIFICANT CHANGE UP (ref 0–0)
NRBC # BLD: 0 /100 WBCS — SIGNIFICANT CHANGE UP (ref 0–0)
NRBC # FLD: 0 K/UL — SIGNIFICANT CHANGE UP (ref 0–0)
NRBC # FLD: 0 K/UL — SIGNIFICANT CHANGE UP (ref 0–0)
NT-PROBNP SERPL-SCNC: 1619 PG/ML — HIGH
NT-PROBNP SERPL-SCNC: 1619 PG/ML — HIGH
PCO2 BLDV: 49 MMHG — SIGNIFICANT CHANGE UP (ref 39–52)
PCO2 BLDV: 49 MMHG — SIGNIFICANT CHANGE UP (ref 39–52)
PCO2 BLDV: 70 MMHG — HIGH (ref 39–52)
PCO2 BLDV: 70 MMHG — HIGH (ref 39–52)
PH BLDV: 7.16 — LOW (ref 7.32–7.43)
PH BLDV: 7.16 — LOW (ref 7.32–7.43)
PH BLDV: 7.28 — LOW (ref 7.32–7.43)
PH BLDV: 7.28 — LOW (ref 7.32–7.43)
PLATELET # BLD AUTO: 309 K/UL — SIGNIFICANT CHANGE UP (ref 150–400)
PLATELET # BLD AUTO: 309 K/UL — SIGNIFICANT CHANGE UP (ref 150–400)
PO2 BLDV: 42 MMHG — SIGNIFICANT CHANGE UP (ref 25–45)
PO2 BLDV: 42 MMHG — SIGNIFICANT CHANGE UP (ref 25–45)
PO2 BLDV: 48 MMHG — HIGH (ref 25–45)
PO2 BLDV: 48 MMHG — HIGH (ref 25–45)
POTASSIUM BLDV-SCNC: 5.3 MMOL/L — HIGH (ref 3.5–5.1)
POTASSIUM BLDV-SCNC: 5.3 MMOL/L — HIGH (ref 3.5–5.1)
POTASSIUM BLDV-SCNC: 5.8 MMOL/L — HIGH (ref 3.5–5.1)
POTASSIUM BLDV-SCNC: 5.8 MMOL/L — HIGH (ref 3.5–5.1)
POTASSIUM SERPL-MCNC: 4.9 MMOL/L — SIGNIFICANT CHANGE UP (ref 3.5–5.3)
POTASSIUM SERPL-MCNC: 4.9 MMOL/L — SIGNIFICANT CHANGE UP (ref 3.5–5.3)
POTASSIUM SERPL-MCNC: 5.5 MMOL/L — HIGH (ref 3.5–5.3)
POTASSIUM SERPL-MCNC: 5.5 MMOL/L — HIGH (ref 3.5–5.3)
POTASSIUM SERPL-SCNC: 4.9 MMOL/L — SIGNIFICANT CHANGE UP (ref 3.5–5.3)
POTASSIUM SERPL-SCNC: 4.9 MMOL/L — SIGNIFICANT CHANGE UP (ref 3.5–5.3)
POTASSIUM SERPL-SCNC: 5.5 MMOL/L — HIGH (ref 3.5–5.3)
POTASSIUM SERPL-SCNC: 5.5 MMOL/L — HIGH (ref 3.5–5.3)
PROCALCITONIN SERPL-MCNC: 0.03 NG/ML — SIGNIFICANT CHANGE UP (ref 0.02–0.1)
PROCALCITONIN SERPL-MCNC: 0.03 NG/ML — SIGNIFICANT CHANGE UP (ref 0.02–0.1)
PROT SERPL-MCNC: 7.3 G/DL — SIGNIFICANT CHANGE UP (ref 6–8.3)
PROT SERPL-MCNC: 7.3 G/DL — SIGNIFICANT CHANGE UP (ref 6–8.3)
PROTHROM AB SERPL-ACNC: 10.5 SEC — SIGNIFICANT CHANGE UP (ref 9.5–13)
PROTHROM AB SERPL-ACNC: 10.5 SEC — SIGNIFICANT CHANGE UP (ref 9.5–13)
RAPID RVP RESULT: SIGNIFICANT CHANGE UP
RAPID RVP RESULT: SIGNIFICANT CHANGE UP
RBC # BLD: 5.15 M/UL — SIGNIFICANT CHANGE UP (ref 3.8–5.2)
RBC # BLD: 5.15 M/UL — SIGNIFICANT CHANGE UP (ref 3.8–5.2)
RBC # FLD: 13.9 % — SIGNIFICANT CHANGE UP (ref 10.3–14.5)
RBC # FLD: 13.9 % — SIGNIFICANT CHANGE UP (ref 10.3–14.5)
RSV RNA SPEC QL NAA+PROBE: SIGNIFICANT CHANGE UP
RSV RNA SPEC QL NAA+PROBE: SIGNIFICANT CHANGE UP
RV+EV RNA SPEC QL NAA+PROBE: SIGNIFICANT CHANGE UP
RV+EV RNA SPEC QL NAA+PROBE: SIGNIFICANT CHANGE UP
SAO2 % BLDV: 70.9 % — SIGNIFICANT CHANGE UP (ref 67–88)
SAO2 % BLDV: 70.9 % — SIGNIFICANT CHANGE UP (ref 67–88)
SAO2 % BLDV: 71.6 % — SIGNIFICANT CHANGE UP (ref 67–88)
SAO2 % BLDV: 71.6 % — SIGNIFICANT CHANGE UP (ref 67–88)
SARS-COV-2 RNA SPEC QL NAA+PROBE: SIGNIFICANT CHANGE UP
SARS-COV-2 RNA SPEC QL NAA+PROBE: SIGNIFICANT CHANGE UP
SODIUM SERPL-SCNC: 136 MMOL/L — SIGNIFICANT CHANGE UP (ref 135–145)
SODIUM SERPL-SCNC: 136 MMOL/L — SIGNIFICANT CHANGE UP (ref 135–145)
SODIUM SERPL-SCNC: 138 MMOL/L — SIGNIFICANT CHANGE UP (ref 135–145)
SODIUM SERPL-SCNC: 138 MMOL/L — SIGNIFICANT CHANGE UP (ref 135–145)
TROPONIN T, HIGH SENSITIVITY RESULT: 101 NG/L — CRITICAL HIGH
TROPONIN T, HIGH SENSITIVITY RESULT: 101 NG/L — CRITICAL HIGH
TROPONIN T, HIGH SENSITIVITY RESULT: 118 NG/L — CRITICAL HIGH
TROPONIN T, HIGH SENSITIVITY RESULT: 118 NG/L — CRITICAL HIGH
TROPONIN T, HIGH SENSITIVITY RESULT: 37 NG/L — SIGNIFICANT CHANGE UP
TROPONIN T, HIGH SENSITIVITY RESULT: 37 NG/L — SIGNIFICANT CHANGE UP
WBC # BLD: 19.81 K/UL — HIGH (ref 3.8–10.5)
WBC # BLD: 19.81 K/UL — HIGH (ref 3.8–10.5)
WBC # FLD AUTO: 19.81 K/UL — HIGH (ref 3.8–10.5)
WBC # FLD AUTO: 19.81 K/UL — HIGH (ref 3.8–10.5)

## 2023-11-10 PROCEDURE — 99223 1ST HOSP IP/OBS HIGH 75: CPT | Mod: GC

## 2023-11-10 PROCEDURE — 71045 X-RAY EXAM CHEST 1 VIEW: CPT | Mod: 26

## 2023-11-10 PROCEDURE — 99291 CRITICAL CARE FIRST HOUR: CPT

## 2023-11-10 RX ORDER — MONTELUKAST 4 MG/1
1 TABLET, CHEWABLE ORAL
Qty: 0 | Refills: 0 | DISCHARGE

## 2023-11-10 RX ORDER — CHOLECALCIFEROL (VITAMIN D3) 125 MCG
1000 CAPSULE ORAL DAILY
Refills: 0 | Status: DISCONTINUED | OUTPATIENT
Start: 2023-11-10 | End: 2023-11-14

## 2023-11-10 RX ORDER — ENOXAPARIN SODIUM 100 MG/ML
40 INJECTION SUBCUTANEOUS EVERY 24 HOURS
Refills: 0 | Status: DISCONTINUED | OUTPATIENT
Start: 2023-11-10 | End: 2023-11-10

## 2023-11-10 RX ORDER — HEPARIN SODIUM 5000 [USP'U]/ML
4700 INJECTION INTRAVENOUS; SUBCUTANEOUS EVERY 6 HOURS
Refills: 0 | Status: DISCONTINUED | OUTPATIENT
Start: 2023-11-10 | End: 2023-11-11

## 2023-11-10 RX ORDER — AMLODIPINE BESYLATE 2.5 MG/1
5 TABLET ORAL DAILY
Refills: 0 | Status: DISCONTINUED | OUTPATIENT
Start: 2023-11-10 | End: 2023-11-14

## 2023-11-10 RX ORDER — AZITHROMYCIN 500 MG/1
500 TABLET, FILM COATED ORAL DAILY
Refills: 0 | Status: DISCONTINUED | OUTPATIENT
Start: 2023-11-10 | End: 2023-11-14

## 2023-11-10 RX ORDER — AZITHROMYCIN 500 MG/1
500 TABLET, FILM COATED ORAL ONCE
Refills: 0 | Status: COMPLETED | OUTPATIENT
Start: 2023-11-10 | End: 2023-11-10

## 2023-11-10 RX ORDER — LISINOPRIL 2.5 MG/1
1 TABLET ORAL
Qty: 0 | Refills: 0 | DISCHARGE

## 2023-11-10 RX ORDER — GLUCAGON INJECTION, SOLUTION 0.5 MG/.1ML
1 INJECTION, SOLUTION SUBCUTANEOUS ONCE
Refills: 0 | Status: DISCONTINUED | OUTPATIENT
Start: 2023-11-10 | End: 2023-11-14

## 2023-11-10 RX ORDER — DEXTROSE 50 % IN WATER 50 %
25 SYRINGE (ML) INTRAVENOUS ONCE
Refills: 0 | Status: DISCONTINUED | OUTPATIENT
Start: 2023-11-10 | End: 2023-11-14

## 2023-11-10 RX ORDER — CLOPIDOGREL BISULFATE 75 MG/1
75 TABLET, FILM COATED ORAL DAILY
Refills: 0 | Status: DISCONTINUED | OUTPATIENT
Start: 2023-11-11 | End: 2023-11-11

## 2023-11-10 RX ORDER — INSULIN LISPRO 100/ML
VIAL (ML) SUBCUTANEOUS
Refills: 0 | Status: DISCONTINUED | OUTPATIENT
Start: 2023-11-10 | End: 2023-11-14

## 2023-11-10 RX ORDER — GLYBURIDE-METFORMIN HYDROCHLORIDE 1.25; 25 MG/1; MG/1
2 TABLET ORAL
Qty: 0 | Refills: 0 | DISCHARGE

## 2023-11-10 RX ORDER — FLUTICASONE PROPIONATE AND SALMETEROL 50; 250 UG/1; UG/1
2 POWDER ORAL; RESPIRATORY (INHALATION)
Refills: 0 | DISCHARGE

## 2023-11-10 RX ORDER — HEPARIN SODIUM 5000 [USP'U]/ML
INJECTION INTRAVENOUS; SUBCUTANEOUS
Qty: 25000 | Refills: 0 | Status: DISCONTINUED | OUTPATIENT
Start: 2023-11-10 | End: 2023-11-11

## 2023-11-10 RX ORDER — ENOXAPARIN SODIUM 100 MG/ML
40 INJECTION SUBCUTANEOUS EVERY 12 HOURS
Refills: 0 | Status: DISCONTINUED | OUTPATIENT
Start: 2023-11-10 | End: 2023-11-10

## 2023-11-10 RX ORDER — FUROSEMIDE 40 MG
40 TABLET ORAL DAILY
Refills: 0 | Status: DISCONTINUED | OUTPATIENT
Start: 2023-11-10 | End: 2023-11-10

## 2023-11-10 RX ORDER — CHOLECALCIFEROL (VITAMIN D3) 125 MCG
1 CAPSULE ORAL
Qty: 0 | Refills: 0 | DISCHARGE

## 2023-11-10 RX ORDER — DEXTROSE 50 % IN WATER 50 %
12.5 SYRINGE (ML) INTRAVENOUS ONCE
Refills: 0 | Status: DISCONTINUED | OUTPATIENT
Start: 2023-11-10 | End: 2023-11-14

## 2023-11-10 RX ORDER — CARVEDILOL PHOSPHATE 80 MG/1
6.25 CAPSULE, EXTENDED RELEASE ORAL EVERY 12 HOURS
Refills: 0 | Status: DISCONTINUED | OUTPATIENT
Start: 2023-11-10 | End: 2023-11-14

## 2023-11-10 RX ORDER — LISINOPRIL 2.5 MG/1
40 TABLET ORAL DAILY
Refills: 0 | Status: DISCONTINUED | OUTPATIENT
Start: 2023-11-10 | End: 2023-11-10

## 2023-11-10 RX ORDER — ASPIRIN/CALCIUM CARB/MAGNESIUM 324 MG
324 TABLET ORAL ONCE
Refills: 0 | Status: COMPLETED | OUTPATIENT
Start: 2023-11-10 | End: 2023-11-10

## 2023-11-10 RX ORDER — CEFTRIAXONE 500 MG/1
1000 INJECTION, POWDER, FOR SOLUTION INTRAMUSCULAR; INTRAVENOUS ONCE
Refills: 0 | Status: COMPLETED | OUTPATIENT
Start: 2023-11-10 | End: 2023-11-10

## 2023-11-10 RX ORDER — CARVEDILOL PHOSPHATE 80 MG/1
6.25 CAPSULE, EXTENDED RELEASE ORAL EVERY 12 HOURS
Refills: 0 | Status: DISCONTINUED | OUTPATIENT
Start: 2023-11-10 | End: 2023-11-10

## 2023-11-10 RX ORDER — ASCORBIC ACID 60 MG
500 TABLET,CHEWABLE ORAL DAILY
Refills: 0 | Status: DISCONTINUED | OUTPATIENT
Start: 2023-11-10 | End: 2023-11-14

## 2023-11-10 RX ORDER — LANOLIN ALCOHOL/MO/W.PET/CERES
3 CREAM (GRAM) TOPICAL AT BEDTIME
Refills: 0 | Status: DISCONTINUED | OUTPATIENT
Start: 2023-11-10 | End: 2023-11-14

## 2023-11-10 RX ORDER — FLUTICASONE PROPIONATE AND SALMETEROL 50; 250 UG/1; UG/1
1 POWDER ORAL; RESPIRATORY (INHALATION)
Qty: 0 | Refills: 0 | DISCHARGE

## 2023-11-10 RX ORDER — ALBUTEROL 90 UG/1
2 AEROSOL, METERED ORAL
Qty: 0 | Refills: 0 | DISCHARGE

## 2023-11-10 RX ORDER — CLOPIDOGREL BISULFATE 75 MG/1
300 TABLET, FILM COATED ORAL ONCE
Refills: 0 | Status: COMPLETED | OUTPATIENT
Start: 2023-11-10 | End: 2023-11-10

## 2023-11-10 RX ORDER — HYDROCHLOROTHIAZIDE 25 MG
1 TABLET ORAL
Qty: 0 | Refills: 0 | DISCHARGE

## 2023-11-10 RX ORDER — MONTELUKAST 4 MG/1
10 TABLET, CHEWABLE ORAL DAILY
Refills: 0 | Status: DISCONTINUED | OUTPATIENT
Start: 2023-11-10 | End: 2023-11-14

## 2023-11-10 RX ORDER — ACETAMINOPHEN 500 MG
650 TABLET ORAL EVERY 6 HOURS
Refills: 0 | Status: DISCONTINUED | OUTPATIENT
Start: 2023-11-10 | End: 2023-11-14

## 2023-11-10 RX ORDER — ALBUTEROL 90 UG/1
2 AEROSOL, METERED ORAL EVERY 6 HOURS
Refills: 0 | Status: DISCONTINUED | OUTPATIENT
Start: 2023-11-10 | End: 2023-11-14

## 2023-11-10 RX ORDER — INSULIN LISPRO 100/ML
5 VIAL (ML) SUBCUTANEOUS ONCE
Refills: 0 | Status: DISCONTINUED | OUTPATIENT
Start: 2023-11-10 | End: 2023-11-10

## 2023-11-10 RX ORDER — CARVEDILOL PHOSPHATE 80 MG/1
1 CAPSULE, EXTENDED RELEASE ORAL
Qty: 0 | Refills: 0 | DISCHARGE

## 2023-11-10 RX ORDER — AMLODIPINE BESYLATE 2.5 MG/1
5 TABLET ORAL DAILY
Refills: 0 | Status: DISCONTINUED | OUTPATIENT
Start: 2023-11-10 | End: 2023-11-10

## 2023-11-10 RX ORDER — INSULIN DETEMIR 100/ML (3)
15 INSULIN PEN (ML) SUBCUTANEOUS
Qty: 0 | Refills: 0 | DISCHARGE

## 2023-11-10 RX ORDER — AMLODIPINE BESYLATE 2.5 MG/1
1 TABLET ORAL
Qty: 0 | Refills: 0 | DISCHARGE

## 2023-11-10 RX ORDER — ASCORBIC ACID 60 MG
1 TABLET,CHEWABLE ORAL
Qty: 0 | Refills: 0 | DISCHARGE

## 2023-11-10 RX ORDER — SODIUM CHLORIDE 9 MG/ML
1000 INJECTION, SOLUTION INTRAVENOUS
Refills: 0 | Status: DISCONTINUED | OUTPATIENT
Start: 2023-11-10 | End: 2023-11-14

## 2023-11-10 RX ORDER — ASPIRIN/CALCIUM CARB/MAGNESIUM 324 MG
81 TABLET ORAL DAILY
Refills: 0 | Status: DISCONTINUED | OUTPATIENT
Start: 2023-11-10 | End: 2023-11-11

## 2023-11-10 RX ORDER — ATORVASTATIN CALCIUM 80 MG/1
80 TABLET, FILM COATED ORAL AT BEDTIME
Refills: 0 | Status: DISCONTINUED | OUTPATIENT
Start: 2023-11-10 | End: 2023-11-14

## 2023-11-10 RX ORDER — CEFTRIAXONE 500 MG/1
1000 INJECTION, POWDER, FOR SOLUTION INTRAMUSCULAR; INTRAVENOUS EVERY 24 HOURS
Refills: 0 | Status: COMPLETED | OUTPATIENT
Start: 2023-11-10 | End: 2023-11-14

## 2023-11-10 RX ORDER — IPRATROPIUM/ALBUTEROL SULFATE 18-103MCG
3 AEROSOL WITH ADAPTER (GRAM) INHALATION EVERY 6 HOURS
Refills: 0 | Status: DISCONTINUED | OUTPATIENT
Start: 2023-11-10 | End: 2023-11-14

## 2023-11-10 RX ORDER — NITROGLYCERIN 6.5 MG
100 CAPSULE, EXTENDED RELEASE ORAL
Qty: 50 | Refills: 0 | Status: DISCONTINUED | OUTPATIENT
Start: 2023-11-10 | End: 2023-11-10

## 2023-11-10 RX ORDER — LISINOPRIL 2.5 MG/1
40 TABLET ORAL DAILY
Refills: 0 | Status: DISCONTINUED | OUTPATIENT
Start: 2023-11-10 | End: 2023-11-11

## 2023-11-10 RX ORDER — DEXTROSE 50 % IN WATER 50 %
15 SYRINGE (ML) INTRAVENOUS ONCE
Refills: 0 | Status: DISCONTINUED | OUTPATIENT
Start: 2023-11-10 | End: 2023-11-14

## 2023-11-10 RX ADMIN — Medication 40 MILLIGRAM(S): at 13:24

## 2023-11-10 RX ADMIN — ATORVASTATIN CALCIUM 80 MILLIGRAM(S): 80 TABLET, FILM COATED ORAL at 23:47

## 2023-11-10 RX ADMIN — CARVEDILOL PHOSPHATE 6.25 MILLIGRAM(S): 80 CAPSULE, EXTENDED RELEASE ORAL at 19:12

## 2023-11-10 RX ADMIN — Medication 324 MILLIGRAM(S): at 18:36

## 2023-11-10 RX ADMIN — MONTELUKAST 10 MILLIGRAM(S): 4 TABLET, CHEWABLE ORAL at 13:24

## 2023-11-10 RX ADMIN — ALBUTEROL 2 PUFF(S): 90 AEROSOL, METERED ORAL at 14:33

## 2023-11-10 RX ADMIN — CEFTRIAXONE 100 MILLIGRAM(S): 500 INJECTION, POWDER, FOR SOLUTION INTRAMUSCULAR; INTRAVENOUS at 13:23

## 2023-11-10 RX ADMIN — HEPARIN SODIUM 950 UNIT(S)/HR: 5000 INJECTION INTRAVENOUS; SUBCUTANEOUS at 20:45

## 2023-11-10 RX ADMIN — CLOPIDOGREL BISULFATE 300 MILLIGRAM(S): 75 TABLET, FILM COATED ORAL at 19:16

## 2023-11-10 RX ADMIN — Medication 30 MICROGRAM(S)/MIN: at 08:58

## 2023-11-10 RX ADMIN — CEFTRIAXONE 100 MILLIGRAM(S): 500 INJECTION, POWDER, FOR SOLUTION INTRAMUSCULAR; INTRAVENOUS at 11:33

## 2023-11-10 RX ADMIN — Medication 40 MILLIGRAM(S): at 08:29

## 2023-11-10 RX ADMIN — HEPARIN SODIUM 950 UNIT(S)/HR: 5000 INJECTION INTRAVENOUS; SUBCUTANEOUS at 23:11

## 2023-11-10 RX ADMIN — Medication 3 MILLILITER(S): at 16:07

## 2023-11-10 RX ADMIN — Medication 8: at 20:58

## 2023-11-10 RX ADMIN — AZITHROMYCIN 255 MILLIGRAM(S): 500 TABLET, FILM COATED ORAL at 14:33

## 2023-11-10 RX ADMIN — Medication 3 MILLILITER(S): at 22:59

## 2023-11-10 NOTE — ED PROVIDER NOTE - PHYSICAL EXAMINATION
GEN: uncomfortable appearing with labored breathing  HEENT: normocephalic, atraumatic, EOMI, PERRL, no scleral icterus, no conjuntival pallor, moist MM  CARDIAC: tachycardic, regular rhythm, S1, S2, no murmur. Radial pulses present and symmetric b/l  PULM: tachypneic, crackles b/l  ABD: soft NT, ND, no rebound no guarding, no CVA tenderness.   MSK: Moving all extremities, no edema  NEURO:  no focal neurological deficits, CN 2-12 grossly intact  SKIN: warm, dry, no rash.

## 2023-11-10 NOTE — ED ADULT NURSE REASSESSMENT NOTE - NS ED NURSE REASSESS COMMENT FT1
Pt awake and alert, A&OX4, tolerating biPAP well. Denies CP, SOB, HA, dizziness, palpitations, blurry vision. Reports feeling much better. Nitro drip remains off at this time per MD Norris. Report given to primary RN Marycruz.
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
## 2023-11-10 NOTE — ED PROVIDER NOTE - CONSIDERATION OF ADMISSION OBSERVATION
Consideration of Admission/Observation pt critically ill on presentation, will require admission for respiratory support and further management

## 2023-11-10 NOTE — H&P ADULT - ASSESSMENT
68F with diabetes, CHF, CAD (s/p 2 stents ~4 years ago), asthma, presents with acute onset shortness of breath that started once she woke up this morning and was not precipitated by any events. She tried a nebulizer she had at home without any improvement. Associated orthopnea? CXR with b/l pleural effusions, hilar adenopathy s/f ADHF vs asthma exacerbation.

## 2023-11-10 NOTE — ED ADULT NURSE NOTE - OBJECTIVE STATEMENT
Nikia RN: Pt received to rm 18, awake and alert, A&OX4, brought in by EMS as notification on CPAP.  C/o SOB that started around 5am and was unrelieved by nebulizer treatment and albuterol inhaler at home. Difficulty speaking in full sentences on arrival. Placed on biPAP by respiratory therapist. Denies CP,  N/V, HA, dizziness, palpitations, blurry vision. Received with 20g IV to L AC, +flush, + blood return, skin intact. 20g IV placed to R hand.  Sinus tachycardia on cardiac monitor. MD Quinn and MD Norris at bedside. Bed in lowest position, call bell within reach. Safety maintained.

## 2023-11-10 NOTE — ED ADULT TRIAGE NOTE - CHIEF COMPLAINT QUOTE
Patient received as notification to room 18 for respiratory distress. Placed on CPAP by EMS. Hx. CHF.

## 2023-11-10 NOTE — ED ADULT NURSE REASSESSMENT NOTE - NS ED NURSE REASSESS COMMENT FT1
pt resting comfortably, taken to bathroom in wheelchair but was able to ambulate to toilet. Pt weaned off Bipap, on 2L NC. Will continue to monitor.

## 2023-11-10 NOTE — ED PROVIDER NOTE - ATTENDING CONTRIBUTION TO CARE
Dr. Quinn: 69 yo female with HTN, DM2 (not on insulin), CHF, CAD with stent, asthma, in ED with SOB noted this morning.  EMS was called and pt arrived on CPAP, s/p nitro SL x 2 with minimal improvement.  Pt denies fever, N/V/D or abdominal pain.  She denies leg swelling.  On exam pt ill and unwell appearing, in moderate respiratory distress/tachypnea, heart tachycardia, lungs diffuse crackles bilaterally, abd NTND, both legs 1+ edema, strength 5/5 in all extremities and skin without rash.    Pt requiring nitro drip and Lasix, as well as BiPAP, to treat acute pulmonary edema and to avoid intubation.

## 2023-11-10 NOTE — H&P ADULT - NSHPREVIEWOFSYSTEMS_GEN_ALL_CORE
REVIEW OF SYSTEMS:    CONSTITUTIONAL: No fevers or chills  EYES/ENT: No throat pain   NECK: No pain or stiffness  RESPIRATORY: +Shortness of breath. No cough, wheezing, hemoptysis  CARDIOVASCULAR: No chest pain or palpitations  GASTROINTESTINAL: No abdominal or epigastric pain. No nausea, vomiting, or hematemesis; No diarrhea or constipation. No melena or hematochezia.  GENITOURINARY: No dysuria, frequency or hematuria  NEUROLOGICAL: No weakness  SKIN: No rashes

## 2023-11-10 NOTE — H&P ADULT - PROBLEM SELECTOR PLAN 1
ddx ADHF and asthma exacerbation  RVP neg  CXR with bilateral hilar and bibasilar fluffy opacities with small bilateral pleural effusions. Findings concerning for mild pulmonary edema.   VBG with improvement from 7.16/70/48 (on admission) to 7.28/49/23  - s/p CTX/azithro  - s/p lasix 40 mg IV   - started pred 40 mg daily for 5 days   - continue with home advair 2 puffs in the morning  - continue with home singulair 10 mg at night  - pulmonary consulted - recs appreciated ddx ADHF and asthma exacerbation  RVP neg  CXR with bilateral hilar and bibasilar fluffy opacities with small bilateral pleural effusions. Findings concerning for mild pulmonary edema.   VBG with improvement from 7.16/70/48 (on admission) to 7.28/49/23  - s/p CTX/azithro  - f/u bcx x2  - s/p lasix 40 mg IV   - started pred 40 mg daily for 5 days   - continue with home advair 2 puffs in the morning  - continue with home singulair 10 mg at night  - pulmonary consulted - recs appreciated

## 2023-11-10 NOTE — CONSULT NOTE ADULT - ASSESSMENT
EC/10 9:17 stable RBBB  11/10 16:00 new TWI in V4-V6    Echo:   2020  1. Normal mitral valve. Mild mitral regurgitation.  2. Aortic valve not well visualized; probably normal.  3. Normal left atrium.  LA volume index = 26 cc/m2.  4. Increased relative wall thickness with normal left ventricular mass index, consistent with concentric left ventricular remodeling.  5. Mild to moderate segmental left ventricular systolic dysfunction. Hypokinesis of the inferolateral and inferior walls.  6. Normal right ventricular size and function.      CXR: Bilateral hilar and bibasilar fluffy opacities with small bilateral pleural effusions. Findings concerning for mild pulmonary edema.    68F PMH diabetes, HFmrEF, CAD (s/p 2 stents ~4 years ago), asthma admitted with acute onset SOB and orthopnea not responsive to home inhalers. On admission hypercarbic on VBG, improving with BiPAP concerning for ?mixed picture of asthma exacerbation and CHF exacerbation. Cardiology consulted for dynamic EKG changes and troponin elevation 37->118. Denying chest pain/palpitations. She does report recent abnormal stress test with plan for LHC by her outpatient cardiolgist who's name she cannot recall. Overall concerning for NSTEMI, unclear if brought on by stress of ?asthma/CHF exacerbation vs CHF exacerbation 2/2 NSTEMI.          EC/10 9:17 stable RBBB  11/10 16:00 new TWI in V4-V6    Echo:   2020  1. Normal mitral valve. Mild mitral regurgitation.  2. Aortic valve not well visualized; probably normal.  3. Normal left atrium.  LA volume index = 26 cc/m2.  4. Increased relative wall thickness with normal left ventricular mass index, consistent with concentric left ventricular remodeling.  5. Mild to moderate segmental left ventricular systolic dysfunction. Hypokinesis of the inferolateral and inferior walls.  6. Normal right ventricular size and function.      CXR: Bilateral hilar and bibasilar fluffy opacities with small bilateral pleural effusions. Findings concerning for mild pulmonary edema.    68F PMH diabetes, HFmrEF, CAD (s/p 2 stents ~4 years ago), asthma admitted with acute onset SOB and orthopnea not responsive to home inhalers. On admission hypercarbic on VBG, improving with BiPAP concerning for ?mixed picture of asthma exacerbation and CHF exacerbation. Cardiology consulted for dynamic EKG changes and troponin elevation 37->118. Denying chest pain/palpitations. She does report recent abnormal stress test with plan for LHC by her outpatient cardiolgist who's name she cannot recall. Overall concerning for NSTEMI, unclear if brought on by stress of ?asthma/CHF exacerbation vs CHF exacerbation 2/2 NSTEMI.     Recommendations:   - continue IV diuresis with lasix 40 IV 1-2x/day  -- goal neg 1-2L  -- STRICT I/Os  -- BID lytes, goal K > 4, Mg > 2  - continue asthma exacerbation management  - s/p aspirin/plavix load  - cont aspirin, plavix  - cont heparin gtt x48hrs  - plan for LHC on Monday    Danielle Ignacio MD  PGY-4, Cardiology  Available on TEAMS    For all new consults  [email or web address]  Login: [email or web address]

## 2023-11-10 NOTE — ED ADULT NURSE NOTE - ED STAT RN HAND OFF
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 \par - patient wishes to proceed with conservative management and repeat MRA in 1 year  Handoff

## 2023-11-10 NOTE — H&P ADULT - TIME BILLING
Review of laboratory data, radiology results, consultants' recommendations, documentation in [facility], discussion with patient/house staff and interdisciplinary staff (such as , social workers, etc). Interventions were performed as documented above.

## 2023-11-10 NOTE — CONSULT NOTE ADULT - ATTENDING COMMENTS
Likely type II MI.  Will likely need LHC given her history.
68-year-old female with significant past medical history of heart failure and asthma who comes in for shortness of breath and was found to have acute on chronic hypercapnic respiratory failure.  Patient indicates that she normally gets relief with nebulizer when she has an asthma exacerbation this does not help early this morning and so she decided come to the emergency room.  She was given nebulizers in addition to diuresis.  She is currently not wheezing and moving good air.  It is likely that she is undergoing heart failure exacerbation and not an asthma exacerbation.  For now we will continue on diuresis but if her shortness of breath does not improve and she has continued signs of wheezing then at that point would treat with steroids.  She no longer requires BiPAP at this time.  Rest of the plan as per above.

## 2023-11-10 NOTE — H&P ADULT - PROBLEM SELECTOR PLAN 2
Echo 2019 with EF 40% with inferior and basal to mid inferolateral wall hypokinesis.  - f/u TTE  - continue carvedilol 6.25 mg BID PO  - c/w home lisinopril 40 daily  - c/w home atorvastatin 80 mg daily   - Lipid panel   - TSH /w reflex

## 2023-11-10 NOTE — ED PROVIDER NOTE - DIFFERENTIAL DIAGNOSIS
acute pulmonary edema, CHF, pneumonia, URI; unlikely PE given crackles and evidence of fluid overload; consider asthma/COPD, but less likely (not likely enough to start with steroids at this time due to concern for worsening glycemic control) Differential Diagnosis

## 2023-11-10 NOTE — ED PROVIDER NOTE - CLINICAL SUMMARY MEDICAL DECISION MAKING FREE TEXT BOX
68-year-old female history of diabetes not on insulin, CHF, CAD status post stent, asthma, presents with acute onset shortness of breath upon awakening this morning.  Called EMS, was found to be hypoxic in the 70s, received DuoNeb and placed on CPAP with improvement in O2 sat. Not hypoxic on CPAP, tachypneic, crackles b/l. Likely CHF exacerbation over asthma exacerbation. Possible pneumonia. Low concern for PE. Will start Bipap, nitro, lasix, labs, CXR, TBA.

## 2023-11-10 NOTE — ED ADULT NURSE REASSESSMENT NOTE - NS ED NURSE REASSESS COMMENT FT1
pt tolerated going to bathroom with ambulation. Displayed no exertion of breathing, remained stable.

## 2023-11-10 NOTE — H&P ADULT - NSHPPHYSICALEXAM_GEN_ALL_CORE
VITALS:   T(C): 36.8 (11-10-23 @ 10:03), Max: 36.8 (11-10-23 @ 10:03)  HR: 80 (11-10-23 @ 11:24) (77 - 114)  BP: 124/81 (11-10-23 @ 11:24) (118/94 - 165/108)  RR: 22 (11-10-23 @ 11:24) (22 - 36)  SpO2: 100% (11-10-23 @ 11:24) (100% - 100%)    GENERAL: NAD, lying in bed comfortably  HEAD:  Atraumatic, normocephalic  EYES: EOMI, PERRLA, conjunctiva and sclera clear  ENT: Moist mucous membranes  NECK: Supple, no JVD  HEART: Regular rate and rhythm, no murmurs, rubs, or gallops  LUNGS: Unlabored respirations.  Clear to auscultation bilaterally, no crackles, wheezing, or rhonchi  ABDOMEN: Soft, nontender, nondistended, +BS  EXTREMITIES: 2+ peripheral pulses bilaterally. No clubbing, cyanosis, or edema  NERVOUS SYSTEM:  A&Ox3, no focal deficits   SKIN: No rashes or lesions

## 2023-11-10 NOTE — H&P ADULT - NSHPSOCIALHISTORY_GEN_ALL_CORE
Patient ambulates without assistance and is independent in ADLs  She lives with , son and grandchildren   No smoking history, alcohol or other substance use

## 2023-11-10 NOTE — ED PROVIDER NOTE - PROGRESS NOTE DETAILS
Dr. Quinn:  systolic, pt with increased work of breathing on BiPAP, giving Lasix IVP, but will start nitro drip to temporize in light of increased work of breathing Dr. Quinn: now on nitro drip 200 mcg/min, BP down to 157 systolic (MAP >100), pt notes feeling some improvement Dr. Quinn: BP now 118/94 on nitro drip 200 mcg/min--will decrease nitro drip to 100 mcg/min Dr. Quinn: pt clinically improved, speaking much better on BiPAP; labs noted to show elevated pro-BNP and elevated trop (will repeat); WBC 19--clinical picture more consistent with fluid overload/CHF, and pt without fever or localizing symptoms of infection anywhere else, but will give abx to treat possible pulmonary source; will also give insulin SubQ given hyperglycemia (negative beta hydroxybutyrate); insulin will also help with mild elevation in K Patient reassessed. Symptomatic improvement in SOB. Patient SBPs in 120 and DBPs in 90s on 100mcg/min of ntiro. Will trial off of nitro.  -Darryl Norris PGY-2 Dr. Quinn: no insulin given due to FS 170s without insulin Patient with stable BPs off nitro.  -Darryl Norris PGY-2 Discussed with pulm, will evaluate patient. TBA to floor given stability and off nitro gtt.  -Darryl Norris PGY-2

## 2023-11-10 NOTE — CONSULT NOTE ADULT - ASSESSMENT
68F with diabetes, CHF, CAD, asthma being admitted for acute hypercapnic/hypoxic respiratory failure.    #acute hypercapnic/hypoxic respiratory failure  #asthma  #ADHF    - Suspect multifactorial but primarily cardiac etiology for her SOB; likely pulmonary edema 2/2 CHF in conjunction with asthma exacerbation and possible PNA  - CXR showing Bilateral hilar and bibasilar fluffy opacities with small bilateral pleural effusions. Findings concerning for mild pulmonary edema. Trace edema on exam.  - patient wheezing on exam  - leukocytotic  - pCO2 on bipap improved to 49 from 70    Recommendations:  - treat as combination of ADHF and asthma exacerbation  - c/w BIPAP for today and overnight; and afterwards, QHS and if needed during the daytime  - will defer diuresis to primary team, although agree with current IV Lasix 40mg   - agree with CAP treatment (CTX/azithro) ISO of elevated WBC; please obtain procalcitonin  - RVP negative  - please start PO Prednisone 40mg for five days  - continue with duonebs q4-6h standing for now; once wheezing improved, can eventually transition to PRN and restart home Advair  - restart home Singulair   - please obtain TTE  - patient will need to follow up with pulmonary clinic once discharged for further care  - Pulmonary to follow    Recommendations are not final until attested by attending.    Crow Elise MD  Fellow, Pulmonary-Critical Care 68F with diabetes, CHF, CAD, asthma being admitted for acute hypercapnic/hypoxic respiratory failure.    #acute hypercapnic/hypoxic respiratory failure  #asthma  #ADHF    - Suspect multifactorial but primarily cardiac etiology for her SOB; likely pulmonary edema 2/2 CHF in conjunction with asthma exacerbation and possible PNA  - CXR showing Bilateral hilar and bibasilar fluffy opacities with small bilateral pleural effusions. Findings concerning for mild pulmonary edema. Trace edema on exam.  - patient wheezing on exam  - leukocytotic  - pCO2 on bipap improved to 49 from 70  - saturating 100% while off bipap but tachypneic    Recommendations:  - treat as combination of ADHF and asthma exacerbation  - c/w BIPAP for today and overnight; and afterwards, QHS and if needed during the daytime  - will defer diuresis to primary team, although agree with current IV Lasix 40mg   - agree with CAP treatment (CTX/azithro) ISO of elevated WBC; please obtain procalcitonin  - RVP negative  - please start PO Prednisone 40mg for five days  - continue with duonebs q4-6h standing for now; once wheezing improved, can eventually transition to PRN and restart home Advair  - restart home Singulair   - please obtain TTE  - patient will need to follow up with pulmonary clinic once discharged for further care  - Pulmonary to follow    Recommendations are not final until attested by attending.    Crow Elise MD  Fellow, Pulmonary-Critical Care 68F with diabetes, CHF, CAD, asthma being admitted for acute hypercapnic/hypoxic respiratory failure.    #acute hypercapnic/hypoxic respiratory failure  #asthma  #ADHF    - Suspect multifactorial but primarily cardiac etiology for her SOB; likely pulmonary edema 2/2 CHF in conjunction with asthma exacerbation and possible PNA  - CXR showing Bilateral hilar and bibasilar fluffy opacities with small bilateral pleural effusions. Findings concerning for mild pulmonary edema. Trace edema on exam.  - patient wheezing on exam  - leukocytotic  - pCO2 on bipap improved to 49 from 70  - saturating 100% while off bipap but tachypneic    Recommendations:  - treat as combination of ADHF and asthma exacerbation  - c/w BIPAP for today and overnight; and afterwards, QHS and if needed during the daytime  - will defer diuresis to primary team, although agree with starting IV Lasix  - agree with CAP treatment (CTX/azithro) ISO of elevated WBC; please obtain procalcitonin  - RVP negative  - if no improvement following aggressive diuresis, can start PO Prednisone 40mg for five days for asthma  - continue with duonebs q4-6h standing for now; once wheezing improved, can eventually transition to PRN and restart home Advair  - restart home Singulair   - please obtain TTE  - patient will need to follow up with pulmonary clinic once discharged for further care  - Pulmonary to follow    Recommendations are not final until attested by attending.    Crow Elise MD  Fellow, Pulmonary-Critical Care 68F with diabetes, CHF, CAD, asthma being admitted for acute hypercapnic/hypoxic respiratory failure.    #acute hypercapnic/hypoxic respiratory failure  #asthma  #ADHF    - Suspect multifactorial but primarily cardiac etiology for her SOB; likely pulmonary edema 2/2 CHF in conjunction with asthma exacerbation and possible PNA  - CXR showing Bilateral hilar and bibasilar fluffy opacities with small bilateral pleural effusions. Findings concerning for mild pulmonary edema. Trace edema on exam.  - patient wheezing on exam  - leukocytotic  - pCO2 on bipap improved to 49 from 70  - saturating 100% while off bipap but tachypneic    Recommendations:  - treat as combination of ADHF and asthma exacerbation, but focusing on the heart failure as primary cause  - c/w BIPAP for today and overnight; and afterwards, QHS and if needed during the daytime  - please continue diuresing patient   - agree with CAP treatment (CTX/azithro) ISO of elevated WBC; please obtain procalcitonin and consider stopping Abx if procal is low  - RVP negative  - if no improvement in the next 24-48 hours following diuresis, can start PO Prednisone 40mg for five days for asthma  - Strict I/Os  - continue with duonebs q4-6h standing for now and transition to PRN once improved (and restart home Advair at that time)  - restart home Singulair   - please obtain TTE  - patient will need to follow up with pulmonary clinic once discharged for further care; please email [email or web address] 24-48 hrs prior to d/c  - Pulmonary to follow    Recommendations are not final until attested by attending.    Crow Elise MD  Fellow, Pulmonary-Critical Care

## 2023-11-10 NOTE — H&P ADULT - ATTENDING COMMENTS
68F with T2DM on oral agents, HFrEF, CAD c/b NSTEMI (s/p 2 stents ~4 years ago), asthma, who presents with dyspnea and was found to have acute hypoxemic respiratory failure likely 2/2 to CAP and asthma exacerbation, with suspected element of HF exacerbation as well.       #Acute hypoxemic and hypercapnic respiratory failure:   -found to be hypoxic to 70s by EMS  -placed on BIPAP in ED, now improved and was able to be transitioned to 3 L NC  -PCO2 improved on BIPAP  -in setting of PNA, asthma exacerbation as above  -CAP coverage with CTX/Azithromcyin for suspected PNA  -sputum cx if able    #Asthma exacerbation:   -likely precipitated by PNA  -RVP negative  -pulm following: Prednisone 40 mg daily x 5 days    #CHF exacerbation:   -previously on nitro gtt, now successfully weaned off  -improved s/p BIPAP, now on NC  -s/p Lasix 40 mg IV in ED  -will re-evaluate volume status and dose diuretic accordingly  -Last TTE in 2020 with EF of 46%; will obtain repeat    #CAD:  -troponin elevated from 37 ---> 118 with mildly elevated CK. No active chest pain  -EKG changes; now with TWI in V4-V6  -pt has hx of STEMI in 2019 s/p 2 stents  -will ASA load  -trend cardiac enzymes  -doubt ACS at this time given lack of CP, suspect likely demand ischemia  -cardiology  consult    #T2DM:   -moderate dose ISS given expected steroid induced hyperglycemia  -f/u hgb A1C    Remainder of problems as above 68F with T2DM on oral agents, HFrEF, CAD c/b NSTEMI (s/p 2 stents ~4 years ago), asthma, who presents with dyspnea and was found to have acute hypoxemic respiratory failure likely 2/2 to CAP and asthma exacerbation, with suspected element of HF exacerbation as well.       #Acute hypoxemic and hypercapnic respiratory failure:   -found to be hypoxic to 70s by EMS  -placed on BIPAP in ED, now improved and was able to be transitioned to 3 L NC  -PCO2 improved on BIPAP  -in setting of PNA, asthma exacerbation as above  -CAP coverage with CTX/Azithromcyin for suspected PNA  -sputum cx if able    #Asthma exacerbation:   -likely precipitated by PNA  -RVP negative  -pulm following: Prednisone 40 mg daily x 5 days    #CHF exacerbation:   -previously on nitro gtt, now successfully weaned off  -improved s/p BIPAP, now on NC  -s/p Lasix 40 mg IV in ED  -will re-evaluate volume status and dose diuretic accordingly  -Last TTE in 2020 with EF of 46%; will obtain repeat    #CAD:  -troponin elevated from 37 ---> 118 with mildly elevated CK. No active chest pain  -EKG changes; now with TWI in V4-V6  -pt has hx of STEMI in 2019 s/p 2 stents  -will ASA load  -treat like NSTEMI with plavix load and heparin gtt  -trend cardiac enzymes  -cardiology  consult; plan for likely cath    #T2DM:   -moderate dose ISS given expected steroid induced hyperglycemia  -f/u hgb A1C    Remainder of problems as above

## 2023-11-10 NOTE — H&P ADULT - PROBLEM SELECTOR PLAN 6
- continue with 5 mg PO amlodipine  - continue carvedilol 6.25 mg BID PO  - c/w home lisinopril 40 daily

## 2023-11-10 NOTE — CONSULT NOTE ADULT - SUBJECTIVE AND OBJECTIVE BOX
CHIEF COMPLAINT: sob    HPI:  68F with diabetes, CHF, CAD, asthma, presents with acute onset shortness of breath upon awakening this morning.  Called EMS, was found to be hypoxic in the 70s, received DuoNeb and placed on CPAP with improvement in O2 sat.       Asthma hx:    Denies fevers, chest pain, leg swelling.    CXR showing Bilateral hilar and bibasilar fluffy opacities with small bilateral pleural effusions. Findings concerning for mild pulmonary edema.    Pulm c/f new BIPAP.        PAST MEDICAL & SURGICAL HISTORY:  DM (diabetes mellitus)      HTN (hypertension)      HLD (hyperlipidemia)      Asthma  last attack 3 moths ago, never intubated , never hospitalized      Non-ST elevation MI (NSTEMI)  2019 with 2 coronary stents placement, pt on Plavix and ASA 81 mg      Posterior heel bump  left      Cystocele with prolapse      Exostosis of bone of ankle  left      H/O cholecystitis      History of cholecystectomy        S/P cardiac catheterization  NSTEMI with 2 coronary stents 2019      S/P  section            FAMILY HISTORY:  FHx: breast cancer  mother-     FH: type 2 diabetes  father-         SOCIAL HISTORY:  Smoking: [ ] Never Smoked [ ] Former Smoker (__ packs x ___ years) [ ] Current Smoker  (__ packs x ___ years)  Substance Use: [ ] Never Used [ ] Used ____  EtOH Use:  Occupation:  Recent Travel:  Country of Birth:  Advance Directives:    Allergies    No Known Allergies    Intolerances        HOME MEDICATIONS:    REVIEW OF SYSTEMS:  Constitutional: [x] negative [ ] fevers [ ] chills [ ] weight loss [ ] weight gain  HEENT: [x] negative [ ] dry eyes [ ] eye irritation [ ] postnasal drip [ ] nasal congestion  CV: [x] negative  [ ] chest pain [ ] orthopnea [ ] palpitations [ ] murmur  Resp: [x] negative [ ] cough [ ] shortness of breath [ ] dyspnea [ ] wheezing [ ] sputum [ ] hemoptysis  GI: [x] negative [ ] nausea [ ] vomiting [ ] diarrhea [ ] constipation [ ] abd pain [ ] dysphagia   : [x] negative [ ] dysuria [ ] nocturia [ ] hematuria [ ] increased urinary frequency  Musculoskeletal: [x] negative [ ] back pain [ ] myalgias [ ] arthralgias [ ] fracture  Skin: [x] negative [ ] rash [ ] itch  Neurological: [x] negative [ ] headache [ ] dizziness [ ] syncope [ ] weakness [ ] numbness  Psychiatric: [x] negative [ ] anxiety [ ] depression  Endocrine: [x] negative [ ] diabetes [ ] thyroid problem  Hematologic/Lymphatic: [x] negative [ ] anemia [ ] bleeding problem  Allergic/Immunologic: [x] negative [ ] itchy eyes [ ] nasal discharge [ ] hives [ ] angioedema  [x] All other systems negative  [ ] Unable to assess ROS because ________    OBJECTIVE:  ICU Vital Signs Last 24 Hrs  T(C): 36.8 (10 Nov 2023 10:03), Max: 36.8 (10 Nov 2023 10:03)  T(F): 98.3 (10 Nov 2023 10:03), Max: 98.3 (10 Nov 2023 10:03)  HR: 80 (10 Nov 2023 11:24) (80 - 114)  BP: 130/88 (10 Nov 2023 10:34) (118/94 - 165/108)  BP(mean): 101 (10 Nov 2023 10:34) (101 - 123)  ABP: --  ABP(mean): --  RR: 22 (10 Nov 2023 10:34) (22 - 36)  SpO2: 100% (10 Nov 2023 11:24) (100% - 100%)    O2 Parameters below as of 10 Nov 2023 10:34  Patient On (Oxygen Delivery Method): BiPAP/CPAP              CAPILLARY BLOOD GLUCOSE      POCT Blood Glucose.: 172 mg/dL (10 Nov 2023 10:43)      PHYSICAL EXAM:  General:   HEENT: MMM, PERRLA, no oropharyngeal or perioral lesions  Neck: supple, no LAD  Respiratory:   Cardiovascular: RRR, no MRG  Abdomen: NTND  Extremities: no LE edema, warm  Neurological: AOx3, no gross deficits    HOSPITAL MEDICATIONS:    azithromycin  IVPB 500 milliGRAM(s) IV Intermittent once  cefTRIAXone   IVPB 1000 milliGRAM(s) IV Intermittent once    furosemide   Injectable 40 milliGRAM(s) IV Push daily  nitroglycerin  Infusion 100 MICROgram(s)/Min IV Continuous <Continuous>                          LABS:                        15.0   19.81 )-----------( 309      ( 10 Nov 2023 08:35 )             46.2     Hgb Trend: 15.0<--  11-10    136  |  100  |  16  ----------------------------<  340<H>  5.5<H>   |  20<L>  |  0.81    Ca    9.1      10 Nov 2023 08:35  Mg     1.60     11-10    TPro  7.3  /  Alb  4.2  /  TBili  0.5  /  DBili  x   /  AST  22  /  ALT  20  /  AlkPhos  64  11-10    Creatinine Trend: 0.81<--  PT/INR - ( 10 Nov 2023 08:35 )   PT: 10.5 sec;   INR: 0.93 ratio         PTT - ( 10 Nov 2023 08:35 )  PTT:30.7 sec  Urinalysis Basic - ( 10 Nov 2023 08:35 )    Color: x / Appearance: x / SG: x / pH: x  Gluc: 340 mg/dL / Ketone: x  / Bili: x / Urobili: x   Blood: x / Protein: x / Nitrite: x   Leuk Esterase: x / RBC: x / WBC x   Sq Epi: x / Non Sq Epi: x / Bacteria: x        Venous Blood Gas:  11-10 @ 10:20  7.28/49/42/23/71.6  VBG Lactate: 3.1  Venous Blood Gas:  11-10 @ 08:35  7.16/70/48/25/70.9  VBG Lactate: 3.9      MICROBIOLOGY:     RADIOLOGY:  [x] Reviewed and interpreted by me    PULMONARY FUNCTION TESTS:    EKG: CHIEF COMPLAINT: sob    HPI:  68F with diabetes, CHF, CAD, asthma, presents with acute onset shortness of breath upon awakening this morning.  Called EMS, was found to be hypoxic in the 70s, received DuoNeb and placed on CPAP with improvement in O2 sat.     Pt states this is not very similar to prior asthma exacerbations states feels some difference. BiPAP has improved her symptoms since presenting. She was dx with Asthma 16 yrs ago; has had numerous hospitalization but the usually improve with nebulizers and typical mng; never been intubated. Denies fevers, chest pain, leg swelling.    She does not follow wiht a pulmonologist outpt. She does follow with cardiology. PCP manages her asthma. No smoking/drugs/alcohol. Previously worked as a HHA. Born in [place].    CXR showing Bilateral hilar and bibasilar fluffy opacities with small bilateral pleural effusions. Findings concerning for mild pulmonary edema.    Thus far she's been started on CTX and azithro for CAP, and given IV lasix 40mg. RVP negative.    Pulm c/f new BIPAP.        PAST MEDICAL & SURGICAL HISTORY:  DM (diabetes mellitus)      HTN (hypertension)      HLD (hyperlipidemia)      Asthma  last attack 3 moths ago, never intubated , never hospitalized      Non-ST elevation MI (NSTEMI)  2019 with 2 coronary stents placement, pt on Plavix and ASA 81 mg      Posterior heel bump  left      Cystocele with prolapse      Exostosis of bone of ankle  left      H/O cholecystitis      History of cholecystectomy        S/P cardiac catheterization  NSTEMI with 2 coronary stents 2019      S/P  section            FAMILY HISTORY:  FHx: breast cancer  mother-     FH: type 2 diabetes  father-         SOCIAL HISTORY:  Smoking: [ ] Never Smoked [ ] Former Smoker (__ packs x ___ years) [ ] Current Smoker  (__ packs x ___ years)  Substance Use: [ ] Never Used [ ] Used ____  EtOH Use:  Occupation:  Recent Travel:  Country of Birth:  Advance Directives:    Allergies    No Known Allergies    Intolerances        HOME MEDICATIONS:    REVIEW OF SYSTEMS:  Constitutional: [x] negative [ ] fevers [ ] chills [ ] weight loss [ ] weight gain  HEENT: [x] negative [ ] dry eyes [ ] eye irritation [ ] postnasal drip [ ] nasal congestion  CV: [x] negative  [ ] chest pain [ ] orthopnea [ ] palpitations [ ] murmur  Resp: [x] negative [ ] cough [ ] shortness of breath [ ] dyspnea [ ] wheezing [ ] sputum [ ] hemoptysis  GI: [x] negative [ ] nausea [ ] vomiting [ ] diarrhea [ ] constipation [ ] abd pain [ ] dysphagia   : [x] negative [ ] dysuria [ ] nocturia [ ] hematuria [ ] increased urinary frequency  Musculoskeletal: [x] negative [ ] back pain [ ] myalgias [ ] arthralgias [ ] fracture  Skin: [x] negative [ ] rash [ ] itch  Neurological: [x] negative [ ] headache [ ] dizziness [ ] syncope [ ] weakness [ ] numbness  Psychiatric: [x] negative [ ] anxiety [ ] depression  Endocrine: [x] negative [ ] diabetes [ ] thyroid problem  Hematologic/Lymphatic: [x] negative [ ] anemia [ ] bleeding problem  Allergic/Immunologic: [x] negative [ ] itchy eyes [ ] nasal discharge [ ] hives [ ] angioedema  [x] All other systems negative  [ ] Unable to assess ROS because ________    OBJECTIVE:  ICU Vital Signs Last 24 Hrs  T(C): 36.8 (10 Nov 2023 10:03), Max: 36.8 (10 Nov 2023 10:03)  T(F): 98.3 (10 Nov 2023 10:03), Max: 98.3 (10 Nov 2023 10:03)  HR: 80 (10 Nov 2023 11:24) (80 - 114)  BP: 130/88 (10 Nov 2023 10:34) (118/94 - 165/108)  BP(mean): 101 (10 Nov 2023 10:34) (101 - 123)  ABP: --  ABP(mean): --  RR: 22 (10 Nov 2023 10:34) (22 - 36)  SpO2: 100% (10 Nov 2023 11:24) (100% - 100%)    O2 Parameters below as of 10 Nov 2023 10:34  Patient On (Oxygen Delivery Method): BiPAP/CPAP              CAPILLARY BLOOD GLUCOSE      POCT Blood Glucose.: 172 mg/dL (10 Nov 2023 10:43)      PHYSICAL EXAM:  General:   HEENT: MMM, PERRLA, no oropharyngeal or perioral lesions  Neck: supple, no LAD  Respiratory:   Cardiovascular: RRR, no MRG  Abdomen: NTND  Extremities: no LE edema, warm  Neurological: AOx3, no gross deficits    HOSPITAL MEDICATIONS:    azithromycin  IVPB 500 milliGRAM(s) IV Intermittent once  cefTRIAXone   IVPB 1000 milliGRAM(s) IV Intermittent once    furosemide   Injectable 40 milliGRAM(s) IV Push daily  nitroglycerin  Infusion 100 MICROgram(s)/Min IV Continuous <Continuous>                          LABS:                        15.0   19.81 )-----------( 309      ( 10 Nov 2023 08:35 )             46.2     Hgb Trend: 15.0<--  1110    136  |  100  |  16  ----------------------------<  340<H>  5.5<H>   |  20<L>  |  0.81    Ca    9.1      10 Nov 2023 08:35  Mg     1.60     11-10    TPro  7.3  /  Alb  4.2  /  TBili  0.5  /  DBili  x   /  AST  22  /  ALT  20  /  AlkPhos  64  11-10    Creatinine Trend: 0.81<--  PT/INR - ( 10 Nov 2023 08:35 )   PT: 10.5 sec;   INR: 0.93 ratio         PTT - ( 10 Nov 2023 08:35 )  PTT:30.7 sec  Urinalysis Basic - ( 10 Nov 2023 08:35 )    Color: x / Appearance: x / SG: x / pH: x  Gluc: 340 mg/dL / Ketone: x  / Bili: x / Urobili: x   Blood: x / Protein: x / Nitrite: x   Leuk Esterase: x / RBC: x / WBC x   Sq Epi: x / Non Sq Epi: x / Bacteria: x        Venous Blood Gas:  11-10 @ 10:20  7.28/49/42/23/71.6  VBG Lactate: 3.1  Venous Blood Gas:  11-10 @ 08:35  7.16/70/48/25/70.9  VBG Lactate: 3.9      MICROBIOLOGY:     RADIOLOGY:  [x] Reviewed and interpreted by me    PULMONARY FUNCTION TESTS:    EKG: CHIEF COMPLAINT: sob    HPI:  68F with diabetes, CHF, CAD, asthma, presents with acute onset shortness of breath upon awakening this morning.  Called EMS, was found to be hypoxic in the 70s, received DuoNeb and placed on CPAP with improvement in O2 sat.     Pt states this is not very similar to prior asthma exacerbations states feels some difference. BiPAP has improved her symptoms since presenting. She was dx with Asthma 16 yrs ago; has had numerous hospitalization but the usually improve with nebulizers and typical mng; never been intubated. Denies fevers, chest pain, leg swelling.    She does not follow wiht a pulmonologist outpt. She does follow with cardiology. PCP manages her asthma. No smoking/drugs/alcohol. 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    CXR showing Bilateral hilar and bibasilar fluffy opacities with small bilateral pleural effusions. Findings concerning for mild pulmonary edema.    Thus far she's been started on CTX and azithro for CAP, and given IV lasix 40mg. RVP negative.    Pulm c/f new BIPAP.        PAST MEDICAL & SURGICAL HISTORY:  DM (diabetes mellitus)      HTN (hypertension)      HLD (hyperlipidemia)      Asthma  last attack 3 moths ago, never intubated , never hospitalized      Non-ST elevation MI (NSTEMI)  2019 with 2 coronary stents placement, pt on Plavix and ASA 81 mg      Posterior heel bump  left      Cystocele with prolapse      Exostosis of bone of ankle  left      H/O cholecystitis      History of cholecystectomy        S/P cardiac catheterization  NSTEMI with 2 coronary stents 2019      S/P  section            FAMILY HISTORY:  FHx: breast cancer  mother-     FH: type 2 diabetes  father-         SOCIAL HISTORY: see hpi  Smoking: [ x] Never Smoked [ ] Former Smoker (__ packs x ___ years) [ ] Current Smoker  (__ packs x ___ years)  Substance Use: [ ] Never Used [ ] Used ____  EtOH Use:  Occupation:  Recent Travel:  Country of Birth:  Advance Directives:    Allergies    No Known Allergies    Intolerances        HOME MEDICATIONS:    REVIEW OF SYSTEMS:  Constitutional: [x] negative [ ] fevers [ ] chills [ ] weight loss [ ] weight gain  HEENT: [x] negative [ ] dry eyes [ ] eye irritation [ ] postnasal drip [ ] nasal congestion  CV: [x] negative  [ ] chest pain [ ] orthopnea [ ] palpitations [ ] murmur  Resp: [x] negative [ ] cough [ ] shortness of breath [ ] dyspnea [ ] wheezing [ ] sputum [ ] hemoptysis  GI: [x] negative [ ] nausea [ ] vomiting [ ] diarrhea [ ] constipation [ ] abd pain [ ] dysphagia   : [x] negative [ ] dysuria [ ] nocturia [ ] hematuria [ ] increased urinary frequency  Musculoskeletal: [x] negative [ ] back pain [ ] myalgias [ ] arthralgias [ ] fracture  Skin: [x] negative [ ] rash [ ] itch  Neurological: [x] negative [ ] headache [ ] dizziness [ ] syncope [ ] weakness [ ] numbness  Psychiatric: [x] negative [ ] anxiety [ ] depression  Endocrine: [x] negative [ ] diabetes [ ] thyroid problem  Hematologic/Lymphatic: [x] negative [ ] anemia [ ] bleeding problem  Allergic/Immunologic: [x] negative [ ] itchy eyes [ ] nasal discharge [ ] hives [ ] angioedema  [x] All other systems negative  [ ] Unable to assess ROS because ________    OBJECTIVE:  ICU Vital Signs Last 24 Hrs  T(C): 36.8 (10 Nov 2023 10:03), Max: 36.8 (10 Nov 2023 10:03)  T(F): 98.3 (10 Nov 2023 10:03), Max: 98.3 (10 Nov 2023 10:03)  HR: 80 (10 Nov 2023 11:24) (80 - 114)  BP: 130/88 (10 Nov 2023 10:34) (118/94 - 165/108)  BP(mean): 101 (10 Nov 2023 10:34) (101 - 123)  ABP: --  ABP(mean): --  RR: 22 (10 Nov 2023 10:34) (22 - 36)  SpO2: 100% (10 Nov 2023 11:24) (100% - 100%)    O2 Parameters below as of 10 Nov 2023 10:34  Patient On (Oxygen Delivery Method): BiPAP/CPAP              CAPILLARY BLOOD GLUCOSE      POCT Blood Glucose.: 172 mg/dL (10 Nov 2023 10:43)      PHYSICAL EXAM:  General: BPAP, comfortable, awake alert  HEENT: MMM, PERRLA, no oropharyngeal or perioral lesions  Neck: supple, no LAD  Respiratory: exp wheezing diffusely bilaterally  Cardiovascular: RRR, no MRG  Abdomen: NTND  Extremities: trace LE edema, warm  Neurological: AOx3, no gross deficits    HOSPITAL MEDICATIONS:    azithromycin  IVPB 500 milliGRAM(s) IV Intermittent once  cefTRIAXone   IVPB 1000 milliGRAM(s) IV Intermittent once    furosemide   Injectable 40 milliGRAM(s) IV Push daily  nitroglycerin  Infusion 100 MICROgram(s)/Min IV Continuous <Continuous>                          LABS:                        15.0   19.81 )-----------( 309      ( 10 Nov 2023 08:35 )             46.2     Hgb Trend: 15.0<--  1110    136  |  100  |  16  ----------------------------<  340<H>  5.5<H>   |  20<L>  |  0.81    Ca    9.1      10 Nov 2023 08:35  Mg     1.60     -10    TPro  7.3  /  Alb  4.2  /  TBili  0.5  /  DBili  x   /  AST  22  /  ALT  20  /  AlkPhos  64  11-10    Creatinine Trend: 0.81<--  PT/INR - ( 10 Nov 2023 08:35 )   PT: 10.5 sec;   INR: 0.93 ratio         PTT - ( 10 Nov 2023 08:35 )  PTT:30.7 sec  Urinalysis Basic - ( 10 Nov 2023 08:35 )    Color: x / Appearance: x / SG: x / pH: x  Gluc: 340 mg/dL / Ketone: x  / Bili: x / Urobili: x   Blood: x / Protein: x / Nitrite: x   Leuk Esterase: x / RBC: x / WBC x   Sq Epi: x / Non Sq Epi: x / Bacteria: x        Venous Blood Gas:  11-10 @ 10:20  7.28/49/42/23/71.6  VBG Lactate: 3.1  Venous Blood Gas:  11-10 @ 08:35  7.16/70/48/25/70.9  VBG Lactate: 3.9      MICROBIOLOGY:     RADIOLOGY:  [x] Reviewed and interpreted by me    PULMONARY FUNCTION TESTS:    EKG:

## 2023-11-10 NOTE — ED PROVIDER NOTE - OBJECTIVE STATEMENT
68-year-old female history of diabetes not on insulin, CHF, CAD status post stent, asthma, presents with acute onset shortness of breath upon awakening this morning.  Called EMS, was found to be hypoxic in the 70s, received DuoNeb and placed on CPAP with improvement in O2 sat. Denies fevers, chest pain, leg swelling.
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
## 2023-11-10 NOTE — ED ADULT NURSE REASSESSMENT NOTE - NS ED NURSE REASSESS COMMENT FT1
pt resting comfortably in stretcher, NAD. Pt AAOx4, ambulated to bathroom with one assist. Will continue to monitor.

## 2023-11-11 LAB
-  STAPHYLOCOCCUS EPIDERMIDIS: SIGNIFICANT CHANGE UP
-  STAPHYLOCOCCUS EPIDERMIDIS: SIGNIFICANT CHANGE UP
A1C WITH ESTIMATED AVERAGE GLUCOSE RESULT: 6.7 % — HIGH (ref 4–5.6)
A1C WITH ESTIMATED AVERAGE GLUCOSE RESULT: 6.7 % — HIGH (ref 4–5.6)
ALBUMIN SERPL ELPH-MCNC: 3.5 G/DL — SIGNIFICANT CHANGE UP (ref 3.3–5)
ALBUMIN SERPL ELPH-MCNC: 3.5 G/DL — SIGNIFICANT CHANGE UP (ref 3.3–5)
ALP SERPL-CCNC: 50 U/L — SIGNIFICANT CHANGE UP (ref 40–120)
ALP SERPL-CCNC: 50 U/L — SIGNIFICANT CHANGE UP (ref 40–120)
ALT FLD-CCNC: 16 U/L — SIGNIFICANT CHANGE UP (ref 4–33)
ALT FLD-CCNC: 16 U/L — SIGNIFICANT CHANGE UP (ref 4–33)
ANION GAP SERPL CALC-SCNC: 12 MMOL/L — SIGNIFICANT CHANGE UP (ref 7–14)
ANION GAP SERPL CALC-SCNC: 12 MMOL/L — SIGNIFICANT CHANGE UP (ref 7–14)
APTT BLD: 60.3 SEC — HIGH (ref 24.5–35.6)
APTT BLD: 60.3 SEC — HIGH (ref 24.5–35.6)
APTT BLD: 65 SEC — HIGH (ref 24.5–35.6)
APTT BLD: 65 SEC — HIGH (ref 24.5–35.6)
AST SERPL-CCNC: 15 U/L — SIGNIFICANT CHANGE UP (ref 4–32)
AST SERPL-CCNC: 15 U/L — SIGNIFICANT CHANGE UP (ref 4–32)
BASE EXCESS BLDV CALC-SCNC: 2 MMOL/L — SIGNIFICANT CHANGE UP (ref -2–3)
BASE EXCESS BLDV CALC-SCNC: 2 MMOL/L — SIGNIFICANT CHANGE UP (ref -2–3)
BILIRUB SERPL-MCNC: 0.4 MG/DL — SIGNIFICANT CHANGE UP (ref 0.2–1.2)
BILIRUB SERPL-MCNC: 0.4 MG/DL — SIGNIFICANT CHANGE UP (ref 0.2–1.2)
BLOOD GAS VENOUS COMPREHENSIVE RESULT: SIGNIFICANT CHANGE UP
BLOOD GAS VENOUS COMPREHENSIVE RESULT: SIGNIFICANT CHANGE UP
BUN SERPL-MCNC: 25 MG/DL — HIGH (ref 7–23)
BUN SERPL-MCNC: 25 MG/DL — HIGH (ref 7–23)
CALCIUM SERPL-MCNC: 9.2 MG/DL — SIGNIFICANT CHANGE UP (ref 8.4–10.5)
CALCIUM SERPL-MCNC: 9.2 MG/DL — SIGNIFICANT CHANGE UP (ref 8.4–10.5)
CHLORIDE BLDV-SCNC: 103 MMOL/L — SIGNIFICANT CHANGE UP (ref 96–108)
CHLORIDE BLDV-SCNC: 103 MMOL/L — SIGNIFICANT CHANGE UP (ref 96–108)
CHLORIDE SERPL-SCNC: 103 MMOL/L — SIGNIFICANT CHANGE UP (ref 98–107)
CHLORIDE SERPL-SCNC: 103 MMOL/L — SIGNIFICANT CHANGE UP (ref 98–107)
CK MB BLD-MCNC: 3.9 % — HIGH (ref 0–2.5)
CK MB BLD-MCNC: 3.9 % — HIGH (ref 0–2.5)
CK MB CFR SERPL CALC: 7.1 NG/ML — HIGH
CK MB CFR SERPL CALC: 7.1 NG/ML — HIGH
CK SERPL-CCNC: 180 U/L — HIGH (ref 25–170)
CK SERPL-CCNC: 180 U/L — HIGH (ref 25–170)
CO2 BLDV-SCNC: 31.1 MMOL/L — HIGH (ref 22–26)
CO2 BLDV-SCNC: 31.1 MMOL/L — HIGH (ref 22–26)
CO2 SERPL-SCNC: 26 MMOL/L — SIGNIFICANT CHANGE UP (ref 22–31)
CO2 SERPL-SCNC: 26 MMOL/L — SIGNIFICANT CHANGE UP (ref 22–31)
CREAT SERPL-MCNC: 1.32 MG/DL — HIGH (ref 0.5–1.3)
CREAT SERPL-MCNC: 1.32 MG/DL — HIGH (ref 0.5–1.3)
EGFR: 45 ML/MIN/1.73M2 — LOW
EGFR: 45 ML/MIN/1.73M2 — LOW
ESTIMATED AVERAGE GLUCOSE: 146 — SIGNIFICANT CHANGE UP
ESTIMATED AVERAGE GLUCOSE: 146 — SIGNIFICANT CHANGE UP
GAS PNL BLDV: 137 MMOL/L — SIGNIFICANT CHANGE UP (ref 136–145)
GAS PNL BLDV: 137 MMOL/L — SIGNIFICANT CHANGE UP (ref 136–145)
GAS PNL BLDV: SIGNIFICANT CHANGE UP
GLUCOSE BLDC GLUCOMTR-MCNC: 123 MG/DL — HIGH (ref 70–99)
GLUCOSE BLDC GLUCOMTR-MCNC: 123 MG/DL — HIGH (ref 70–99)
GLUCOSE BLDC GLUCOMTR-MCNC: 207 MG/DL — HIGH (ref 70–99)
GLUCOSE BLDC GLUCOMTR-MCNC: 207 MG/DL — HIGH (ref 70–99)
GLUCOSE BLDC GLUCOMTR-MCNC: 240 MG/DL — HIGH (ref 70–99)
GLUCOSE BLDC GLUCOMTR-MCNC: 240 MG/DL — HIGH (ref 70–99)
GLUCOSE BLDC GLUCOMTR-MCNC: 291 MG/DL — HIGH (ref 70–99)
GLUCOSE BLDC GLUCOMTR-MCNC: 291 MG/DL — HIGH (ref 70–99)
GLUCOSE BLDV-MCNC: 127 MG/DL — HIGH (ref 70–99)
GLUCOSE BLDV-MCNC: 127 MG/DL — HIGH (ref 70–99)
GLUCOSE SERPL-MCNC: 105 MG/DL — HIGH (ref 70–99)
GLUCOSE SERPL-MCNC: 105 MG/DL — HIGH (ref 70–99)
GRAM STN FLD: ABNORMAL
GRAM STN FLD: ABNORMAL
HCO3 BLDV-SCNC: 29 MMOL/L — SIGNIFICANT CHANGE UP (ref 22–29)
HCO3 BLDV-SCNC: 29 MMOL/L — SIGNIFICANT CHANGE UP (ref 22–29)
HCT VFR BLD CALC: 40.4 % — SIGNIFICANT CHANGE UP (ref 34.5–45)
HCT VFR BLD CALC: 40.4 % — SIGNIFICANT CHANGE UP (ref 34.5–45)
HCT VFR BLD CALC: 40.5 % — SIGNIFICANT CHANGE UP (ref 34.5–45)
HCT VFR BLD CALC: 40.5 % — SIGNIFICANT CHANGE UP (ref 34.5–45)
HCT VFR BLDA CALC: 41 % — SIGNIFICANT CHANGE UP (ref 34.5–46.5)
HCT VFR BLDA CALC: 41 % — SIGNIFICANT CHANGE UP (ref 34.5–46.5)
HGB BLD CALC-MCNC: 13.5 G/DL — SIGNIFICANT CHANGE UP (ref 11.7–16.1)
HGB BLD CALC-MCNC: 13.5 G/DL — SIGNIFICANT CHANGE UP (ref 11.7–16.1)
HGB BLD-MCNC: 13.3 G/DL — SIGNIFICANT CHANGE UP (ref 11.5–15.5)
HGB BLD-MCNC: 13.3 G/DL — SIGNIFICANT CHANGE UP (ref 11.5–15.5)
HGB BLD-MCNC: 13.4 G/DL — SIGNIFICANT CHANGE UP (ref 11.5–15.5)
HGB BLD-MCNC: 13.4 G/DL — SIGNIFICANT CHANGE UP (ref 11.5–15.5)
LACTATE BLDV-MCNC: 1.4 MMOL/L — SIGNIFICANT CHANGE UP (ref 0.5–2)
LACTATE BLDV-MCNC: 1.4 MMOL/L — SIGNIFICANT CHANGE UP (ref 0.5–2)
MAGNESIUM SERPL-MCNC: 1.7 MG/DL — SIGNIFICANT CHANGE UP (ref 1.6–2.6)
MAGNESIUM SERPL-MCNC: 1.7 MG/DL — SIGNIFICANT CHANGE UP (ref 1.6–2.6)
MCHC RBC-ENTMCNC: 28.7 PG — SIGNIFICANT CHANGE UP (ref 27–34)
MCHC RBC-ENTMCNC: 28.7 PG — SIGNIFICANT CHANGE UP (ref 27–34)
MCHC RBC-ENTMCNC: 28.9 PG — SIGNIFICANT CHANGE UP (ref 27–34)
MCHC RBC-ENTMCNC: 28.9 PG — SIGNIFICANT CHANGE UP (ref 27–34)
MCHC RBC-ENTMCNC: 32.8 GM/DL — SIGNIFICANT CHANGE UP (ref 32–36)
MCHC RBC-ENTMCNC: 32.8 GM/DL — SIGNIFICANT CHANGE UP (ref 32–36)
MCHC RBC-ENTMCNC: 33.2 GM/DL — SIGNIFICANT CHANGE UP (ref 32–36)
MCHC RBC-ENTMCNC: 33.2 GM/DL — SIGNIFICANT CHANGE UP (ref 32–36)
MCV RBC AUTO: 87.1 FL — SIGNIFICANT CHANGE UP (ref 80–100)
MCV RBC AUTO: 87.1 FL — SIGNIFICANT CHANGE UP (ref 80–100)
MCV RBC AUTO: 87.5 FL — SIGNIFICANT CHANGE UP (ref 80–100)
MCV RBC AUTO: 87.5 FL — SIGNIFICANT CHANGE UP (ref 80–100)
METHOD TYPE: SIGNIFICANT CHANGE UP
METHOD TYPE: SIGNIFICANT CHANGE UP
NRBC # BLD: 0 /100 WBCS — SIGNIFICANT CHANGE UP (ref 0–0)
NRBC # FLD: 0 K/UL — SIGNIFICANT CHANGE UP (ref 0–0)
PCO2 BLDV: 57 MMHG — HIGH (ref 39–52)
PCO2 BLDV: 57 MMHG — HIGH (ref 39–52)
PH BLDV: 7.32 — SIGNIFICANT CHANGE UP (ref 7.32–7.43)
PH BLDV: 7.32 — SIGNIFICANT CHANGE UP (ref 7.32–7.43)
PHOSPHATE SERPL-MCNC: 3.9 MG/DL — SIGNIFICANT CHANGE UP (ref 2.5–4.5)
PHOSPHATE SERPL-MCNC: 3.9 MG/DL — SIGNIFICANT CHANGE UP (ref 2.5–4.5)
PLATELET # BLD AUTO: 212 K/UL — SIGNIFICANT CHANGE UP (ref 150–400)
PLATELET # BLD AUTO: 212 K/UL — SIGNIFICANT CHANGE UP (ref 150–400)
PLATELET # BLD AUTO: 236 K/UL — SIGNIFICANT CHANGE UP (ref 150–400)
PLATELET # BLD AUTO: 236 K/UL — SIGNIFICANT CHANGE UP (ref 150–400)
PO2 BLDV: 64 MMHG — HIGH (ref 25–45)
PO2 BLDV: 64 MMHG — HIGH (ref 25–45)
POTASSIUM BLDV-SCNC: 4.3 MMOL/L — SIGNIFICANT CHANGE UP (ref 3.5–5.1)
POTASSIUM BLDV-SCNC: 4.3 MMOL/L — SIGNIFICANT CHANGE UP (ref 3.5–5.1)
POTASSIUM SERPL-MCNC: 4.1 MMOL/L — SIGNIFICANT CHANGE UP (ref 3.5–5.3)
POTASSIUM SERPL-MCNC: 4.1 MMOL/L — SIGNIFICANT CHANGE UP (ref 3.5–5.3)
POTASSIUM SERPL-SCNC: 4.1 MMOL/L — SIGNIFICANT CHANGE UP (ref 3.5–5.3)
POTASSIUM SERPL-SCNC: 4.1 MMOL/L — SIGNIFICANT CHANGE UP (ref 3.5–5.3)
PROT SERPL-MCNC: 6.3 G/DL — SIGNIFICANT CHANGE UP (ref 6–8.3)
PROT SERPL-MCNC: 6.3 G/DL — SIGNIFICANT CHANGE UP (ref 6–8.3)
RBC # BLD: 4.63 M/UL — SIGNIFICANT CHANGE UP (ref 3.8–5.2)
RBC # BLD: 4.63 M/UL — SIGNIFICANT CHANGE UP (ref 3.8–5.2)
RBC # BLD: 4.64 M/UL — SIGNIFICANT CHANGE UP (ref 3.8–5.2)
RBC # BLD: 4.64 M/UL — SIGNIFICANT CHANGE UP (ref 3.8–5.2)
RBC # FLD: 14 % — SIGNIFICANT CHANGE UP (ref 10.3–14.5)
RBC # FLD: 14 % — SIGNIFICANT CHANGE UP (ref 10.3–14.5)
RBC # FLD: 14.1 % — SIGNIFICANT CHANGE UP (ref 10.3–14.5)
RBC # FLD: 14.1 % — SIGNIFICANT CHANGE UP (ref 10.3–14.5)
SAO2 % BLDV: 89.4 % — HIGH (ref 67–88)
SAO2 % BLDV: 89.4 % — HIGH (ref 67–88)
SODIUM SERPL-SCNC: 141 MMOL/L — SIGNIFICANT CHANGE UP (ref 135–145)
SODIUM SERPL-SCNC: 141 MMOL/L — SIGNIFICANT CHANGE UP (ref 135–145)
SPECIMEN SOURCE: SIGNIFICANT CHANGE UP
SPECIMEN SOURCE: SIGNIFICANT CHANGE UP
TROPONIN T, HIGH SENSITIVITY RESULT: 105 NG/L — CRITICAL HIGH
TROPONIN T, HIGH SENSITIVITY RESULT: 105 NG/L — CRITICAL HIGH
WBC # BLD: 13.02 K/UL — HIGH (ref 3.8–10.5)
WBC # BLD: 13.02 K/UL — HIGH (ref 3.8–10.5)
WBC # BLD: 13.69 K/UL — HIGH (ref 3.8–10.5)
WBC # BLD: 13.69 K/UL — HIGH (ref 3.8–10.5)
WBC # FLD AUTO: 13.02 K/UL — HIGH (ref 3.8–10.5)
WBC # FLD AUTO: 13.02 K/UL — HIGH (ref 3.8–10.5)
WBC # FLD AUTO: 13.69 K/UL — HIGH (ref 3.8–10.5)
WBC # FLD AUTO: 13.69 K/UL — HIGH (ref 3.8–10.5)

## 2023-11-11 PROCEDURE — 93010 ELECTROCARDIOGRAM REPORT: CPT

## 2023-11-11 PROCEDURE — 99222 1ST HOSP IP/OBS MODERATE 55: CPT

## 2023-11-11 PROCEDURE — 99233 SBSQ HOSP IP/OBS HIGH 50: CPT | Mod: GC

## 2023-11-11 RX ORDER — FUROSEMIDE 40 MG
20 TABLET ORAL ONCE
Refills: 0 | Status: COMPLETED | OUTPATIENT
Start: 2023-11-11 | End: 2023-11-11

## 2023-11-11 RX ADMIN — Medication 500 MILLIGRAM(S): at 13:22

## 2023-11-11 RX ADMIN — Medication 4: at 17:42

## 2023-11-11 RX ADMIN — Medication 1000 UNIT(S): at 13:10

## 2023-11-11 RX ADMIN — HEPARIN SODIUM 950 UNIT(S)/HR: 5000 INJECTION INTRAVENOUS; SUBCUTANEOUS at 10:45

## 2023-11-11 RX ADMIN — CARVEDILOL PHOSPHATE 6.25 MILLIGRAM(S): 80 CAPSULE, EXTENDED RELEASE ORAL at 07:18

## 2023-11-11 RX ADMIN — Medication 20 MILLIGRAM(S): at 13:12

## 2023-11-11 RX ADMIN — HEPARIN SODIUM 950 UNIT(S)/HR: 5000 INJECTION INTRAVENOUS; SUBCUTANEOUS at 08:40

## 2023-11-11 RX ADMIN — Medication 650 MILLIGRAM(S): at 22:44

## 2023-11-11 RX ADMIN — AMLODIPINE BESYLATE 5 MILLIGRAM(S): 2.5 TABLET ORAL at 07:18

## 2023-11-11 RX ADMIN — LISINOPRIL 40 MILLIGRAM(S): 2.5 TABLET ORAL at 07:17

## 2023-11-11 RX ADMIN — AZITHROMYCIN 500 MILLIGRAM(S): 500 TABLET, FILM COATED ORAL at 17:43

## 2023-11-11 RX ADMIN — Medication 6: at 13:09

## 2023-11-11 RX ADMIN — Medication 3 MILLILITER(S): at 04:45

## 2023-11-11 RX ADMIN — Medication 3 MILLILITER(S): at 11:06

## 2023-11-11 RX ADMIN — Medication 3 MILLILITER(S): at 16:34

## 2023-11-11 RX ADMIN — Medication 650 MILLIGRAM(S): at 22:14

## 2023-11-11 RX ADMIN — HEPARIN SODIUM 950 UNIT(S)/HR: 5000 INJECTION INTRAVENOUS; SUBCUTANEOUS at 03:41

## 2023-11-11 RX ADMIN — ATORVASTATIN CALCIUM 80 MILLIGRAM(S): 80 TABLET, FILM COATED ORAL at 22:14

## 2023-11-11 RX ADMIN — Medication 1 TABLET(S): at 13:10

## 2023-11-11 RX ADMIN — CARVEDILOL PHOSPHATE 6.25 MILLIGRAM(S): 80 CAPSULE, EXTENDED RELEASE ORAL at 17:44

## 2023-11-11 RX ADMIN — CEFTRIAXONE 100 MILLIGRAM(S): 500 INJECTION, POWDER, FOR SOLUTION INTRAMUSCULAR; INTRAVENOUS at 13:22

## 2023-11-11 RX ADMIN — Medication 40 MILLIGRAM(S): at 07:17

## 2023-11-11 RX ADMIN — MONTELUKAST 10 MILLIGRAM(S): 4 TABLET, CHEWABLE ORAL at 13:10

## 2023-11-11 NOTE — PATIENT PROFILE ADULT - HAVE YOU BEEN EATING POORLY BECAUSE OF A DECREASED APPETITE?
----- Message from Cha Tirado MA sent at 3/17/2022  3:43 PM CDT -----  Contact: self  Tammie Sanders  MRN: [id number]  Home Phone      [phone]  Work Phone      Not on file.  Mobile          [phone]    Patient Care Team:  Ignacia Chan MD as PCP - General (Family Medicine)  Dayan Duron MD as Obstetrician (Obstetrics)  OB? No  What phone number can you be reached at? [phone]  Message: Needs to make appt for prolapse.  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

## 2023-11-11 NOTE — PROGRESS NOTE ADULT - SUBJECTIVE AND OBJECTIVE BOX
Patient seen and examined at bedside.    Overnight Events: No acute events.     Current Meds:  acetaminophen     Tablet .. 650 milliGRAM(s) Oral every 6 hours PRN  albuterol    90 MICROgram(s) HFA Inhaler 2 Puff(s) Inhalation every 6 hours PRN  albuterol/ipratropium for Nebulization 3 milliLiter(s) Nebulizer every 6 hours  aluminum hydroxide/magnesium hydroxide/simethicone Suspension 30 milliLiter(s) Oral every 4 hours PRN  amLODIPine   Tablet 5 milliGRAM(s) Oral daily  ascorbic acid 500 milliGRAM(s) Oral daily  aspirin enteric coated 81 milliGRAM(s) Oral daily  atorvastatin 80 milliGRAM(s) Oral at bedtime  azithromycin   Tablet 500 milliGRAM(s) Oral daily  carvedilol 6.25 milliGRAM(s) Oral every 12 hours  cefTRIAXone   IVPB 1000 milliGRAM(s) IV Intermittent every 24 hours  cholecalciferol 1000 Unit(s) Oral daily  clopidogrel Tablet 75 milliGRAM(s) Oral daily  dextrose 5%. 1000 milliLiter(s) IV Continuous <Continuous>  dextrose 5%. 1000 milliLiter(s) IV Continuous <Continuous>  dextrose 50% Injectable 25 Gram(s) IV Push once  dextrose 50% Injectable 12.5 Gram(s) IV Push once  dextrose 50% Injectable 25 Gram(s) IV Push once  dextrose Oral Gel 15 Gram(s) Oral once PRN  glucagon  Injectable 1 milliGRAM(s) IntraMuscular once  heparin   Injectable 4700 Unit(s) IV Push every 6 hours PRN  heparin  Infusion.  Unit(s)/Hr IV Continuous <Continuous>  hydrochlorothiazide 25 milliGRAM(s) Oral once  insulin lispro (ADMELOG) corrective regimen sliding scale   SubCutaneous three times a day before meals  lisinopril 40 milliGRAM(s) Oral daily  melatonin 3 milliGRAM(s) Oral at bedtime PRN  montelukast 10 milliGRAM(s) Oral daily  multivitamin 1 Tablet(s) Oral daily  predniSONE   Tablet 40 milliGRAM(s) Oral daily      Vitals:  T(F): 98.7 (11-11), Max: 98.7 (11-11)  HR: 85 (11-11) (77 - 111)  BP: 142/80 (11-11) (118/94 - 146/107)  RR: 18 (11-11)  SpO2: 98% (11-11)  I&O's Summary      Physical Exam:  GEN: comfortable appearing, lying in bed in NAD  HEENT: NCAT, MMM  CV: Regular S1, S2, no m/r/g  RESP: CTAB  ABD: Soft, NTND, +BS  EXT: No LE edema, WWP, pulses palpable throughout  NEURO: No focal deficits, AOx3  SKIN:  No rashes                          13.3   13.69 )-----------( 212      ( 11 Nov 2023 06:55 )             40.5     11-11    141  |  103  |  25<H>  ----------------------------<  105<H>  4.1   |  26  |  1.32<H>    Ca    9.2      11 Nov 2023 06:55  Phos  3.9     11-11  Mg     1.70     11-11    TPro  6.3  /  Alb  3.5  /  TBili  0.4  /  DBili  x   /  AST  15  /  ALT  16  /  AlkPhos  50  11-11    PT/INR - ( 10 Nov 2023 08:35 )   PT: 10.5 sec;   INR: 0.93 ratio         PTT - ( 11 Nov 2023 02:25 )  PTT:60.3 sec  CARDIAC MARKERS ( 11 Nov 2023 02:25 )  105 ng/L / x     / x     / 180 U/L / x     / 7.1 ng/mL  CARDIAC MARKERS ( 10 Nov 2023 20:30 )  101 ng/L / x     / x     / x     / x     / 7.0 ng/mL  CARDIAC MARKERS ( 10 Nov 2023 13:21 )  118 ng/L / x     / x     / 190 U/L / x     / 4.9 ng/mL  CARDIAC MARKERS ( 10 Nov 2023 08:35 )  37 ng/L / x     / x     / x     / x     / x           Patient seen and examined at bedside.    Overnight Events: No acute events. Shortness of breath improving.      Current Meds:  acetaminophen     Tablet .. 650 milliGRAM(s) Oral every 6 hours PRN  albuterol    90 MICROgram(s) HFA Inhaler 2 Puff(s) Inhalation every 6 hours PRN  albuterol/ipratropium for Nebulization 3 milliLiter(s) Nebulizer every 6 hours  aluminum hydroxide/magnesium hydroxide/simethicone Suspension 30 milliLiter(s) Oral every 4 hours PRN  amLODIPine   Tablet 5 milliGRAM(s) Oral daily  ascorbic acid 500 milliGRAM(s) Oral daily  aspirin enteric coated 81 milliGRAM(s) Oral daily  atorvastatin 80 milliGRAM(s) Oral at bedtime  azithromycin   Tablet 500 milliGRAM(s) Oral daily  carvedilol 6.25 milliGRAM(s) Oral every 12 hours  cefTRIAXone   IVPB 1000 milliGRAM(s) IV Intermittent every 24 hours  cholecalciferol 1000 Unit(s) Oral daily  clopidogrel Tablet 75 milliGRAM(s) Oral daily  dextrose 5%. 1000 milliLiter(s) IV Continuous <Continuous>  dextrose 5%. 1000 milliLiter(s) IV Continuous <Continuous>  dextrose 50% Injectable 25 Gram(s) IV Push once  dextrose 50% Injectable 12.5 Gram(s) IV Push once  dextrose 50% Injectable 25 Gram(s) IV Push once  dextrose Oral Gel 15 Gram(s) Oral once PRN  glucagon  Injectable 1 milliGRAM(s) IntraMuscular once  heparin   Injectable 4700 Unit(s) IV Push every 6 hours PRN  heparin  Infusion.  Unit(s)/Hr IV Continuous <Continuous>  hydrochlorothiazide 25 milliGRAM(s) Oral once  insulin lispro (ADMELOG) corrective regimen sliding scale   SubCutaneous three times a day before meals  lisinopril 40 milliGRAM(s) Oral daily  melatonin 3 milliGRAM(s) Oral at bedtime PRN  montelukast 10 milliGRAM(s) Oral daily  multivitamin 1 Tablet(s) Oral daily  predniSONE   Tablet 40 milliGRAM(s) Oral daily      Vitals:  T(F): 98.7 (11-11), Max: 98.7 (11-11)  HR: 85 (11-11) (77 - 111)  BP: 142/80 (11-11) (118/94 - 146/107)  RR: 18 (11-11)  SpO2: 98% (11-11)  I&O's Summary      Physical Exam:  GEN: comfortable appearing, lying in bed in NAD  HEENT: NCAT, MMM  CV: Regular S1, S2, no m/r/g  RESP: CTAB  ABD: Soft, NTND, +BS  EXT: No LE edema, WWP, pulses palpable throughout  NEURO: No focal deficits, AOx3  SKIN:  No rashes                          13.3   13.69 )-----------( 212      ( 11 Nov 2023 06:55 )             40.5     11-11    141  |  103  |  25<H>  ----------------------------<  105<H>  4.1   |  26  |  1.32<H>    Ca    9.2      11 Nov 2023 06:55  Phos  3.9     11-11  Mg     1.70     11-11    TPro  6.3  /  Alb  3.5  /  TBili  0.4  /  DBili  x   /  AST  15  /  ALT  16  /  AlkPhos  50  11-11    PT/INR - ( 10 Nov 2023 08:35 )   PT: 10.5 sec;   INR: 0.93 ratio         PTT - ( 11 Nov 2023 02:25 )  PTT:60.3 sec  CARDIAC MARKERS ( 11 Nov 2023 02:25 )  105 ng/L / x     / x     / 180 U/L / x     / 7.1 ng/mL  CARDIAC MARKERS ( 10 Nov 2023 20:30 )  101 ng/L / x     / x     / x     / x     / 7.0 ng/mL  CARDIAC MARKERS ( 10 Nov 2023 13:21 )  118 ng/L / x     / x     / 190 U/L / x     / 4.9 ng/mL  CARDIAC MARKERS ( 10 Nov 2023 08:35 )  37 ng/L / x     / x     / x     / x     / x

## 2023-11-11 NOTE — PATIENT PROFILE ADULT - FALL HARM RISK - RISK INTERVENTIONS

## 2023-11-11 NOTE — CHART NOTE - NSCHARTNOTEFT_GEN_A_CORE
Visited patient room but unable to find patient. Confirmed room/bed with unit secretary, pt not present. Will revisit patient tomorrow. Brief pulmonary note:    Visited patient room but unable to find patient. Confirmed room/bed with unit secretary, pt not present. Will revisit patient tomorrow.

## 2023-11-11 NOTE — PROGRESS NOTE ADULT - ATTENDING COMMENTS
68F with T2DM on oral agents, HFrEF, CAD c/b NSTEMI (s/p 2 stents ~4 years ago), asthma, who presents with dyspnea and was found to have acute hypoxemic respiratory failure likely 2/2 to CAP and asthma exacerbation, with suspected element of HF exacerbation as well.       #BRENDA:   -Currently 1.36 from 0.8 on admission  -likely multifactorial: suspect possibly cardiorenal in setting of CHF exacerbation with some hemodynamically mediated component in setting of hypertensive crisis upon admission  -stop lisinopril  -c/w gentle diuresis  -monitor Cr        #Acute hypoxemic and hypercapnic respiratory failure:   -Resolved  -found to be hypoxic to 70s by EMS  -placed on BIPAP in ED, now improved and was able to be transitioned to 3 L NC  -PCO2 improved on BIPAP  -in setting of PNA, asthma exacerbation as above  -CAP coverage with CTX/Azithromcyin for suspected PNA  -sputum cx if able    #Asthma exacerbation:   -likely precipitated by PNA  -RVP negative  -pulm following: Prednisone 40 mg daily x 5 days    #CHF exacerbation:   -previously on nitro gtt, now successfully weaned off  -improved s/p BIPAP, now on NC  -s/p Lasix 40 mg IV in ED  -still with some JVD; will dose Lasix 20 mg IV x 1  -will re-evaluate volume status and dose diuretic accordingly  -Last TTE in 2020 with EF of 46%; will obtain repeat    #CAD:  -troponin elevated from 37 ---> 118 with mildly elevated CK. No active chest pain  -EKG changes; now with TWI in V4-V6  -pt has hx of STEMI in 2019 s/p 2 stents  -s/p ASA, plavix, and heparin gtt x 24 hours for suspected NSTEMI. Per cards, given downtrending trops, stopped heparin gtt -- suspect more likley demand ischemia  -cards still planning for likely ischemic eval with Cath on Monday      #T2DM:   -moderate dose ISS given expected steroid induced hyperglycemia  -f/u hgb A1C    Remainder of problems as above

## 2023-11-11 NOTE — PROGRESS NOTE ADULT - SUBJECTIVE AND OBJECTIVE BOX
Delia Velasco MD    Internal Medicine Resident (PGY-1)  ___________________________________________________________________________________________________      SATINDER KELLY 68y Female    Overnight events/subjective: No acute overnight events. Patient seen and examined at bedside. No complaints. Denies fever, chills, chest pain, shortness of breath, abdominal pain, nausea, vomiting, changes in bowel habits, or urinary symptoms.    Vital Signs Last 24 Hrs  T(C): 37.1 (11 Nov 2023 07:05), Max: 37.1 (11 Nov 2023 07:05)  T(F): 98.7 (11 Nov 2023 07:05), Max: 98.7 (11 Nov 2023 07:05)  HR: 85 (11 Nov 2023 07:05) (77 - 114)  BP: 142/80 (11 Nov 2023 07:05) (118/94 - 165/108)  BP(mean): 93 (10 Nov 2023 11:24) (93 - 123)  RR: 18 (11 Nov 2023 07:05) (18 - 36)  SpO2: 98% (11 Nov 2023 07:05) (97% - 100%)    Parameters below as of 11 Nov 2023 07:05  Patient On (Oxygen Delivery Method): nasal cannula  O2 Flow (L/min): 2      PHYSICAL EXAM:  GENERAL: NAD, lying in bed comfortably  HEAD:  Atraumatic, normocephalic  EYES: EOMI, PERRLA, conjunctiva and sclera clear  ENT: Moist mucous membranes  NECK: Supple, no JVD  HEART: Regular rate and rhythm, no murmurs, rubs, or gallops  LUNGS: Unlabored respirations.  Clear to auscultation bilaterally, no crackles, wheezing, or rhonchi  ABDOMEN: Soft, nontender, nondistended, +BS  EXTREMITIES: 2+ peripheral pulses bilaterally. No clubbing, cyanosis, or edema  NERVOUS SYSTEM:  A&Ox3, no focal deficits   SKIN: No rashes or lesions      HOSPITAL MEDICATIONS:  MEDICATIONS  (STANDING):  albuterol/ipratropium for Nebulization 3 milliLiter(s) Nebulizer every 6 hours  amLODIPine   Tablet 5 milliGRAM(s) Oral daily  ascorbic acid 500 milliGRAM(s) Oral daily  aspirin enteric coated 81 milliGRAM(s) Oral daily  atorvastatin 80 milliGRAM(s) Oral at bedtime  azithromycin   Tablet 500 milliGRAM(s) Oral daily  carvedilol 6.25 milliGRAM(s) Oral every 12 hours  cefTRIAXone   IVPB 1000 milliGRAM(s) IV Intermittent every 24 hours  cholecalciferol 1000 Unit(s) Oral daily  clopidogrel Tablet 75 milliGRAM(s) Oral daily  dextrose 5%. 1000 milliLiter(s) (50 mL/Hr) IV Continuous <Continuous>  dextrose 5%. 1000 milliLiter(s) (100 mL/Hr) IV Continuous <Continuous>  dextrose 50% Injectable 25 Gram(s) IV Push once  dextrose 50% Injectable 12.5 Gram(s) IV Push once  dextrose 50% Injectable 25 Gram(s) IV Push once  glucagon  Injectable 1 milliGRAM(s) IntraMuscular once  heparin  Infusion.  Unit(s)/Hr (9.5 mL/Hr) IV Continuous <Continuous>  hydrochlorothiazide 25 milliGRAM(s) Oral once  insulin lispro (ADMELOG) corrective regimen sliding scale   SubCutaneous three times a day before meals  lisinopril 40 milliGRAM(s) Oral daily  montelukast 10 milliGRAM(s) Oral daily  multivitamin 1 Tablet(s) Oral daily  predniSONE   Tablet 40 milliGRAM(s) Oral daily    MEDICATIONS  (PRN):  acetaminophen     Tablet .. 650 milliGRAM(s) Oral every 6 hours PRN Temp greater or equal to 38C (100.4F), Mild Pain (1 - 3)  albuterol    90 MICROgram(s) HFA Inhaler 2 Puff(s) Inhalation every 6 hours PRN Shortness of Breath and/or Wheezing  aluminum hydroxide/magnesium hydroxide/simethicone Suspension 30 milliLiter(s) Oral every 4 hours PRN Dyspepsia  dextrose Oral Gel 15 Gram(s) Oral once PRN Blood Glucose LESS THAN 70 milliGRAM(s)/deciliter  heparin   Injectable 4700 Unit(s) IV Push every 6 hours PRN For aPTT less than 40  melatonin 3 milliGRAM(s) Oral at bedtime PRN Insomnia      LABS:                        13.4   13.02 )-----------( 236      ( 11 Nov 2023 02:25 )             40.4     11-10    138  |  102  |  19  ----------------------------<  116<H>  4.9   |  20<L>  |  1.06    Ca    9.2      10 Nov 2023 13:21  Mg     1.60     11-10    TPro  7.3  /  Alb  4.2  /  TBili  0.5  /  DBili  x   /  AST  22  /  ALT  20  /  AlkPhos  64  11-10    PT/INR - ( 10 Nov 2023 08:35 )   PT: 10.5 sec;   INR: 0.93 ratio         PTT - ( 11 Nov 2023 02:25 )  PTT:60.3 sec  Urinalysis Basic - ( 10 Nov 2023 13:21 )    Color: x / Appearance: x / SG: x / pH: x  Gluc: 116 mg/dL / Ketone: x  / Bili: x / Urobili: x   Blood: x / Protein: x / Nitrite: x   Leuk Esterase: x / RBC: x / WBC x   Sq Epi: x / Non Sq Epi: x / Bacteria: x         Delia Velasco MD    Internal Medicine Resident (PGY-1)  ___________________________________________________________________________________________________      SATINDER KELLY 68y Female    Overnight events/subjective: No acute overnight events. Patient seen and examined at bedside. This morning, patient is not c/o SOB, CP or any other discomforts. She is comfortable on 2-3L NC.   Denies fever, chills, chest pain, shortness of breath, abdominal pain, nausea, vomiting, changes in bowel habits, or urinary symptoms.    Vital Signs Last 24 Hrs  T(C): 37.1 (11 Nov 2023 07:05), Max: 37.1 (11 Nov 2023 07:05)  T(F): 98.7 (11 Nov 2023 07:05), Max: 98.7 (11 Nov 2023 07:05)  HR: 85 (11 Nov 2023 07:05) (77 - 114)  BP: 142/80 (11 Nov 2023 07:05) (118/94 - 165/108)  BP(mean): 93 (10 Nov 2023 11:24) (93 - 123)  RR: 18 (11 Nov 2023 07:05) (18 - 36)  SpO2: 98% (11 Nov 2023 07:05) (97% - 100%)    Parameters below as of 11 Nov 2023 07:05  Patient On (Oxygen Delivery Method): nasal cannula  O2 Flow (L/min): 2      PHYSICAL EXAM:  GENERAL: NAD, lying in bed comfortably  HEAD:  Atraumatic, normocephalic  EYES: EOMI, PERRLA, conjunctiva and sclera clear  ENT: Moist mucous membranes  NECK: Supple, no JVD  HEART: Regular rate and rhythm, no murmurs, rubs, or gallops  LUNGS: Unlabored respirations. Constricted lung sounds with slight respiratory wheezing   ABDOMEN: Soft, nontender, nondistended, +BS  EXTREMITIES: 2+ peripheral pulses bilaterally. No clubbing, cyanosis, or edema  NERVOUS SYSTEM:  A&Ox3, no focal deficits   SKIN: No rashes or lesions      HOSPITAL MEDICATIONS:  MEDICATIONS  (STANDING):  albuterol/ipratropium for Nebulization 3 milliLiter(s) Nebulizer every 6 hours  amLODIPine   Tablet 5 milliGRAM(s) Oral daily  ascorbic acid 500 milliGRAM(s) Oral daily  aspirin enteric coated 81 milliGRAM(s) Oral daily  atorvastatin 80 milliGRAM(s) Oral at bedtime  azithromycin   Tablet 500 milliGRAM(s) Oral daily  carvedilol 6.25 milliGRAM(s) Oral every 12 hours  cefTRIAXone   IVPB 1000 milliGRAM(s) IV Intermittent every 24 hours  cholecalciferol 1000 Unit(s) Oral daily  clopidogrel Tablet 75 milliGRAM(s) Oral daily  dextrose 5%. 1000 milliLiter(s) (50 mL/Hr) IV Continuous <Continuous>  dextrose 5%. 1000 milliLiter(s) (100 mL/Hr) IV Continuous <Continuous>  dextrose 50% Injectable 25 Gram(s) IV Push once  dextrose 50% Injectable 12.5 Gram(s) IV Push once  dextrose 50% Injectable 25 Gram(s) IV Push once  glucagon  Injectable 1 milliGRAM(s) IntraMuscular once  heparin  Infusion.  Unit(s)/Hr (9.5 mL/Hr) IV Continuous <Continuous>  hydrochlorothiazide 25 milliGRAM(s) Oral once  insulin lispro (ADMELOG) corrective regimen sliding scale   SubCutaneous three times a day before meals  lisinopril 40 milliGRAM(s) Oral daily  montelukast 10 milliGRAM(s) Oral daily  multivitamin 1 Tablet(s) Oral daily  predniSONE   Tablet 40 milliGRAM(s) Oral daily    MEDICATIONS  (PRN):  acetaminophen     Tablet .. 650 milliGRAM(s) Oral every 6 hours PRN Temp greater or equal to 38C (100.4F), Mild Pain (1 - 3)  albuterol    90 MICROgram(s) HFA Inhaler 2 Puff(s) Inhalation every 6 hours PRN Shortness of Breath and/or Wheezing  aluminum hydroxide/magnesium hydroxide/simethicone Suspension 30 milliLiter(s) Oral every 4 hours PRN Dyspepsia  dextrose Oral Gel 15 Gram(s) Oral once PRN Blood Glucose LESS THAN 70 milliGRAM(s)/deciliter  heparin   Injectable 4700 Unit(s) IV Push every 6 hours PRN For aPTT less than 40  melatonin 3 milliGRAM(s) Oral at bedtime PRN Insomnia      LABS:                        13.4   13.02 )-----------( 236      ( 11 Nov 2023 02:25 )             40.4     11-10    138  |  102  |  19  ----------------------------<  116<H>  4.9   |  20<L>  |  1.06    Ca    9.2      10 Nov 2023 13:21  Mg     1.60     11-10    TPro  7.3  /  Alb  4.2  /  TBili  0.5  /  DBili  x   /  AST  22  /  ALT  20  /  AlkPhos  64  11-10    PT/INR - ( 10 Nov 2023 08:35 )   PT: 10.5 sec;   INR: 0.93 ratio         PTT - ( 11 Nov 2023 02:25 )  PTT:60.3 sec  Urinalysis Basic - ( 10 Nov 2023 13:21 )    Color: x / Appearance: x / SG: x / pH: x  Gluc: 116 mg/dL / Ketone: x  / Bili: x / Urobili: x   Blood: x / Protein: x / Nitrite: x   Leuk Esterase: x / RBC: x / WBC x   Sq Epi: x / Non Sq Epi: x / Bacteria: x

## 2023-11-11 NOTE — PROGRESS NOTE ADULT - PROBLEM SELECTOR PLAN 1
ddx ADHF and asthma exacerbation  RVP neg  CXR with bilateral hilar and bibasilar fluffy opacities with small bilateral pleural effusions. Findings concerning for mild pulmonary edema.   VBG with improvement from 7.16/70/48 (on admission) to 7.28/49/23  - s/p CTX/azithro  - f/u bcx x2  - s/p lasix 40 mg IV   - started pred 40 mg daily for 5 days   - continue with home advair 2 puffs in the morning  - continue with home singulair 10 mg at night  - pulmonary consulted - recs appreciated ddx ADHF and asthma exacerbation, NSTEMI  RVP neg  CXR with bilateral hilar and bibasilar fluffy opacities with small bilateral pleural effusions. Findings concerning for mild pulmonary edema.   VBG with improvement from 7.16/70/48 (on admission) to 7.28/49/23  - c/w CTX/azithro  - f/u bcx x2  - s/p lasix 40 mg IV   - started pred 40 mg daily for 5 days   - continue with home advair 2 puffs in the morning  - continue with home singulair 10 mg at night  - pulmonary consulted - recs appreciated  - cards consulted for trop elevation form 37 to 118 and EKG with new inverted T waves  - loaded with DAPT   - c/w DAPT  - started heparin gtt  - LHC on Mon  - monitoring trop, CKMB, CK, EKG q6h

## 2023-11-12 LAB
ANION GAP SERPL CALC-SCNC: 13 MMOL/L — SIGNIFICANT CHANGE UP (ref 7–14)
ANION GAP SERPL CALC-SCNC: 13 MMOL/L — SIGNIFICANT CHANGE UP (ref 7–14)
APTT BLD: 24.7 SEC — SIGNIFICANT CHANGE UP (ref 24.5–35.6)
APTT BLD: 24.7 SEC — SIGNIFICANT CHANGE UP (ref 24.5–35.6)
BUN SERPL-MCNC: 32 MG/DL — HIGH (ref 7–23)
BUN SERPL-MCNC: 32 MG/DL — HIGH (ref 7–23)
CALCIUM SERPL-MCNC: 9.5 MG/DL — SIGNIFICANT CHANGE UP (ref 8.4–10.5)
CALCIUM SERPL-MCNC: 9.5 MG/DL — SIGNIFICANT CHANGE UP (ref 8.4–10.5)
CHLORIDE SERPL-SCNC: 102 MMOL/L — SIGNIFICANT CHANGE UP (ref 98–107)
CHLORIDE SERPL-SCNC: 102 MMOL/L — SIGNIFICANT CHANGE UP (ref 98–107)
CO2 SERPL-SCNC: 27 MMOL/L — SIGNIFICANT CHANGE UP (ref 22–31)
CO2 SERPL-SCNC: 27 MMOL/L — SIGNIFICANT CHANGE UP (ref 22–31)
CREAT SERPL-MCNC: 1.18 MG/DL — SIGNIFICANT CHANGE UP (ref 0.5–1.3)
CREAT SERPL-MCNC: 1.18 MG/DL — SIGNIFICANT CHANGE UP (ref 0.5–1.3)
CULTURE RESULTS: ABNORMAL
CULTURE RESULTS: ABNORMAL
EGFR: 51 ML/MIN/1.73M2 — LOW
EGFR: 51 ML/MIN/1.73M2 — LOW
GLUCOSE BLDC GLUCOMTR-MCNC: 171 MG/DL — HIGH (ref 70–99)
GLUCOSE BLDC GLUCOMTR-MCNC: 171 MG/DL — HIGH (ref 70–99)
GLUCOSE BLDC GLUCOMTR-MCNC: 207 MG/DL — HIGH (ref 70–99)
GLUCOSE BLDC GLUCOMTR-MCNC: 207 MG/DL — HIGH (ref 70–99)
GLUCOSE BLDC GLUCOMTR-MCNC: 253 MG/DL — HIGH (ref 70–99)
GLUCOSE BLDC GLUCOMTR-MCNC: 253 MG/DL — HIGH (ref 70–99)
GLUCOSE BLDC GLUCOMTR-MCNC: 316 MG/DL — HIGH (ref 70–99)
GLUCOSE BLDC GLUCOMTR-MCNC: 316 MG/DL — HIGH (ref 70–99)
GLUCOSE SERPL-MCNC: 127 MG/DL — HIGH (ref 70–99)
GLUCOSE SERPL-MCNC: 127 MG/DL — HIGH (ref 70–99)
HCT VFR BLD CALC: 38.2 % — SIGNIFICANT CHANGE UP (ref 34.5–45)
HCT VFR BLD CALC: 38.2 % — SIGNIFICANT CHANGE UP (ref 34.5–45)
HGB BLD-MCNC: 12.6 G/DL — SIGNIFICANT CHANGE UP (ref 11.5–15.5)
HGB BLD-MCNC: 12.6 G/DL — SIGNIFICANT CHANGE UP (ref 11.5–15.5)
MCHC RBC-ENTMCNC: 28.9 PG — SIGNIFICANT CHANGE UP (ref 27–34)
MCHC RBC-ENTMCNC: 28.9 PG — SIGNIFICANT CHANGE UP (ref 27–34)
MCHC RBC-ENTMCNC: 33 GM/DL — SIGNIFICANT CHANGE UP (ref 32–36)
MCHC RBC-ENTMCNC: 33 GM/DL — SIGNIFICANT CHANGE UP (ref 32–36)
MCV RBC AUTO: 87.6 FL — SIGNIFICANT CHANGE UP (ref 80–100)
MCV RBC AUTO: 87.6 FL — SIGNIFICANT CHANGE UP (ref 80–100)
NRBC # BLD: 0 /100 WBCS — SIGNIFICANT CHANGE UP (ref 0–0)
NRBC # BLD: 0 /100 WBCS — SIGNIFICANT CHANGE UP (ref 0–0)
NRBC # FLD: 0 K/UL — SIGNIFICANT CHANGE UP (ref 0–0)
NRBC # FLD: 0 K/UL — SIGNIFICANT CHANGE UP (ref 0–0)
ORGANISM # SPEC MICROSCOPIC CNT: ABNORMAL
PLATELET # BLD AUTO: 190 K/UL — SIGNIFICANT CHANGE UP (ref 150–400)
PLATELET # BLD AUTO: 190 K/UL — SIGNIFICANT CHANGE UP (ref 150–400)
POTASSIUM SERPL-MCNC: 3.7 MMOL/L — SIGNIFICANT CHANGE UP (ref 3.5–5.3)
POTASSIUM SERPL-MCNC: 3.7 MMOL/L — SIGNIFICANT CHANGE UP (ref 3.5–5.3)
POTASSIUM SERPL-SCNC: 3.7 MMOL/L — SIGNIFICANT CHANGE UP (ref 3.5–5.3)
POTASSIUM SERPL-SCNC: 3.7 MMOL/L — SIGNIFICANT CHANGE UP (ref 3.5–5.3)
PROCALCITONIN SERPL-MCNC: 0.45 NG/ML — HIGH (ref 0.02–0.1)
PROCALCITONIN SERPL-MCNC: 0.45 NG/ML — HIGH (ref 0.02–0.1)
RBC # BLD: 4.36 M/UL — SIGNIFICANT CHANGE UP (ref 3.8–5.2)
RBC # BLD: 4.36 M/UL — SIGNIFICANT CHANGE UP (ref 3.8–5.2)
RBC # FLD: 14 % — SIGNIFICANT CHANGE UP (ref 10.3–14.5)
RBC # FLD: 14 % — SIGNIFICANT CHANGE UP (ref 10.3–14.5)
S PNEUM AG UR QL: NEGATIVE — SIGNIFICANT CHANGE UP
S PNEUM AG UR QL: NEGATIVE — SIGNIFICANT CHANGE UP
SODIUM SERPL-SCNC: 142 MMOL/L — SIGNIFICANT CHANGE UP (ref 135–145)
SODIUM SERPL-SCNC: 142 MMOL/L — SIGNIFICANT CHANGE UP (ref 135–145)
SPECIMEN SOURCE: SIGNIFICANT CHANGE UP
SPECIMEN SOURCE: SIGNIFICANT CHANGE UP
WBC # BLD: 11.72 K/UL — HIGH (ref 3.8–10.5)
WBC # BLD: 11.72 K/UL — HIGH (ref 3.8–10.5)
WBC # FLD AUTO: 11.72 K/UL — HIGH (ref 3.8–10.5)
WBC # FLD AUTO: 11.72 K/UL — HIGH (ref 3.8–10.5)

## 2023-11-12 PROCEDURE — 99233 SBSQ HOSP IP/OBS HIGH 50: CPT | Mod: GC

## 2023-11-12 RX ORDER — INSULIN LISPRO 100/ML
4 VIAL (ML) SUBCUTANEOUS
Refills: 0 | Status: DISCONTINUED | OUTPATIENT
Start: 2023-11-12 | End: 2023-11-14

## 2023-11-12 RX ORDER — BUDESONIDE AND FORMOTEROL FUMARATE DIHYDRATE 160; 4.5 UG/1; UG/1
2 AEROSOL RESPIRATORY (INHALATION)
Refills: 0 | Status: DISCONTINUED | OUTPATIENT
Start: 2023-11-12 | End: 2023-11-14

## 2023-11-12 RX ORDER — FUROSEMIDE 40 MG
40 TABLET ORAL DAILY
Refills: 0 | Status: DISCONTINUED | OUTPATIENT
Start: 2023-11-12 | End: 2023-11-14

## 2023-11-12 RX ADMIN — MONTELUKAST 10 MILLIGRAM(S): 4 TABLET, CHEWABLE ORAL at 12:08

## 2023-11-12 RX ADMIN — Medication 3 MILLILITER(S): at 10:08

## 2023-11-12 RX ADMIN — Medication 1 TABLET(S): at 12:09

## 2023-11-12 RX ADMIN — BUDESONIDE AND FORMOTEROL FUMARATE DIHYDRATE 2 PUFF(S): 160; 4.5 AEROSOL RESPIRATORY (INHALATION) at 23:18

## 2023-11-12 RX ADMIN — Medication 2: at 09:34

## 2023-11-12 RX ADMIN — AMLODIPINE BESYLATE 5 MILLIGRAM(S): 2.5 TABLET ORAL at 06:11

## 2023-11-12 RX ADMIN — Medication 40 MILLIGRAM(S): at 06:11

## 2023-11-12 RX ADMIN — CARVEDILOL PHOSPHATE 6.25 MILLIGRAM(S): 80 CAPSULE, EXTENDED RELEASE ORAL at 06:11

## 2023-11-12 RX ADMIN — Medication 3 MILLIGRAM(S): at 23:19

## 2023-11-12 RX ADMIN — Medication 1000 UNIT(S): at 12:08

## 2023-11-12 RX ADMIN — ATORVASTATIN CALCIUM 80 MILLIGRAM(S): 80 TABLET, FILM COATED ORAL at 22:34

## 2023-11-12 RX ADMIN — Medication 8: at 18:17

## 2023-11-12 RX ADMIN — Medication 3 MILLILITER(S): at 16:49

## 2023-11-12 RX ADMIN — AZITHROMYCIN 500 MILLIGRAM(S): 500 TABLET, FILM COATED ORAL at 12:08

## 2023-11-12 RX ADMIN — Medication 3 MILLILITER(S): at 04:46

## 2023-11-12 RX ADMIN — Medication 6: at 12:51

## 2023-11-12 RX ADMIN — CARVEDILOL PHOSPHATE 6.25 MILLIGRAM(S): 80 CAPSULE, EXTENDED RELEASE ORAL at 18:26

## 2023-11-12 RX ADMIN — Medication 500 MILLIGRAM(S): at 12:08

## 2023-11-12 RX ADMIN — Medication 3 MILLILITER(S): at 23:56

## 2023-11-12 RX ADMIN — Medication 4 UNIT(S): at 18:17

## 2023-11-12 RX ADMIN — CEFTRIAXONE 100 MILLIGRAM(S): 500 INJECTION, POWDER, FOR SOLUTION INTRAMUSCULAR; INTRAVENOUS at 12:51

## 2023-11-12 NOTE — PROGRESS NOTE ADULT - PROBLEM SELECTOR PLAN 1
ddx ADHF and asthma exacerbation, NSTEMI  RVP neg  CXR with bilateral hilar and bibasilar fluffy opacities with small bilateral pleural effusions. Findings concerning for mild pulmonary edema.   VBG with improvement from 7.16/70/48 (on admission) to 7.28/49/23  - c/w CTX/azithro  - f/u bcx x2  - s/p lasix 40 mg IV   - started pred 40 mg daily for 5 days   - continue with home advair 2 puffs in the morning  - continue with home singulair 10 mg at night  - pulmonary consulted - recs appreciated  - cards consulted for trop elevation form 37 to 118 and EKG with new inverted T waves  - loaded with DAPT   - c/w DAPT  - started heparin gtt  - LHC on Mon  - monitoring trop, CKMB, CK, EKG q6h ddx ADHF and asthma exacerbation, NSTEMI  RVP neg  CXR with bilateral hilar and bibasilar fluffy opacities with small bilateral pleural effusions. Findings concerning for mild pulmonary edema.   VBG with improvement from 7.16/70/48 (on admission) to 7.28/49/23  - c/w CTX/azithro  - s/p lasix 40 mg IV   - started pred 40 mg daily for 5 days   - continue with home advair 2 puffs in the morning  - continue with home singulair 10 mg at night  - pulmonary consulted - recs appreciated  - cards consulted for trop elevation form 37 to 118 and EKG with new inverted T waves  - loaded with DAPT   - c/w DAPT  - started heparin gtt  - LHC on Mon  - monitoring trop, CKMB, CK, EKG q6h

## 2023-11-12 NOTE — PROGRESS NOTE ADULT - SUBJECTIVE AND OBJECTIVE BOX
Nate Gaffney MD  Internal Medicine, PGY-3    SATINDER KELLY  68y  MRN: [id number]    Patient is a 68y old  Female who presents with a chief complaint of     Subjective/Interval history/overnight events:      MEDICATIONS  (STANDING):  albuterol/ipratropium for Nebulization 3 milliLiter(s) Nebulizer every 6 hours  amLODIPine   Tablet 5 milliGRAM(s) Oral daily  ascorbic acid 500 milliGRAM(s) Oral daily  atorvastatin 80 milliGRAM(s) Oral at bedtime  azithromycin   Tablet 500 milliGRAM(s) Oral daily  carvedilol 6.25 milliGRAM(s) Oral every 12 hours  cefTRIAXone   IVPB 1000 milliGRAM(s) IV Intermittent every 24 hours  cholecalciferol 1000 Unit(s) Oral daily  dextrose 5%. 1000 milliLiter(s) (100 mL/Hr) IV Continuous <Continuous>  dextrose 5%. 1000 milliLiter(s) (50 mL/Hr) IV Continuous <Continuous>  dextrose 50% Injectable 25 Gram(s) IV Push once  dextrose 50% Injectable 12.5 Gram(s) IV Push once  dextrose 50% Injectable 25 Gram(s) IV Push once  glucagon  Injectable 1 milliGRAM(s) IntraMuscular once  insulin lispro (ADMELOG) corrective regimen sliding scale   SubCutaneous three times a day before meals  montelukast 10 milliGRAM(s) Oral daily  multivitamin 1 Tablet(s) Oral daily  predniSONE   Tablet 40 milliGRAM(s) Oral daily    MEDICATIONS  (PRN):  acetaminophen     Tablet .. 650 milliGRAM(s) Oral every 6 hours PRN Temp greater or equal to 38C (100.4F), Mild Pain (1 - 3)  albuterol    90 MICROgram(s) HFA Inhaler 2 Puff(s) Inhalation every 6 hours PRN Shortness of Breath and/or Wheezing  aluminum hydroxide/magnesium hydroxide/simethicone Suspension 30 milliLiter(s) Oral every 4 hours PRN Dyspepsia  dextrose Oral Gel 15 Gram(s) Oral once PRN Blood Glucose LESS THAN 70 milliGRAM(s)/deciliter  melatonin 3 milliGRAM(s) Oral at bedtime PRN Insomnia        Objective:    Vitals: Vital Signs Last 24 Hrs  T(C): 37.1 (11-11-23 @ 21:00), Max: 37.1 (11-11-23 @ 07:05)  T(F): 98.8 (11-11-23 @ 21:00), Max: 98.8 (11-11-23 @ 21:00)  HR: 90 (11-11-23 @ 23:02) (70 - 92)  BP: 119/58 (11-11-23 @ 21:00) (119/58 - 152/89)  BP(mean): 74 (11-11-23 @ 21:00) (74 - 74)  RR: 18 (11-11-23 @ 21:00) (18 - 18)  SpO2: 98% (11-11-23 @ 23:02) (98% - 100%)            I&O's Summary    10 Nov 2023 07:01  -  11 Nov 2023 07:00  --------------------------------------------------------  IN: 0 mL / OUT: 750 mL / NET: -750 mL        PHYSICAL EXAM:  GENERAL: NAD  HEENT: PERRL, no scleral icterus, no head and neck lad   CHEST/LUNG: CTAB, no wheezing, crackles, or ronchi   HEART: RRR, normal S1, S2, no murmurs, gallops, or rubs appreciated   ABDOMEN: soft, nondistended, non-tender, normoactive, no HSM, no rebound, no guarding, no rigidity  SKIN: No rashes or lesions  NERVOUS SYSTEM: Alert & Oriented X3  EXT: no peripheral edema  PSYCH: calm and cooperative     LABS:    11-11    141  |  103  |  25<H>  ----------------------------<  105<H>  4.1   |  26  |  1.32<H>  11-10    138  |  102  |  19  ----------------------------<  116<H>  4.9   |  20<L>  |  1.06  11-10    136  |  100  |  16  ----------------------------<  340<H>  5.5<H>   |  20<L>  |  0.81    Ca    9.2      11 Nov 2023 06:55  Ca    9.2      10 Nov 2023 13:21  Ca    9.1      10 Nov 2023 08:35  Phos  3.9     11-11  Mg     1.70     11-11    TPro  6.3  /  Alb  3.5  /  TBili  0.4  /  DBili  x   /  AST  15  /  ALT  16  /  AlkPhos  50  11-11  TPro  7.3  /  Alb  4.2  /  TBili  0.5  /  DBili  x   /  AST  22  /  ALT  20  /  AlkPhos  64  11-10    PT/INR - ( 10 Nov 2023 08:35 )   PT: 10.5 sec;   INR: 0.93 ratio         PTT - ( 11 Nov 2023 09:40 )  PTT:65.0 sec              Urinalysis Basic - ( 11 Nov 2023 06:55 )    Color: x / Appearance: x / SG: x / pH: x  Gluc: 105 mg/dL / Ketone: x  / Bili: x / Urobili: x   Blood: x / Protein: x / Nitrite: x   Leuk Esterase: x / RBC: x / WBC x   Sq Epi: x / Non Sq Epi: x / Bacteria: x                              13.3   13.69 )-----------( 212      ( 11 Nov 2023 06:55 )             40.5                         13.4   13.02 )-----------( 236      ( 11 Nov 2023 02:25 )             40.4                         15.0   19.81 )-----------( 309      ( 10 Nov 2023 08:35 )             46.2     CAPILLARY BLOOD GLUCOSE      POCT Blood Glucose.: 207 mg/dL (11 Nov 2023 21:15)  POCT Blood Glucose.: 240 mg/dL (11 Nov 2023 17:14)  POCT Blood Glucose.: 291 mg/dL (11 Nov 2023 12:48)  POCT Blood Glucose.: 123 mg/dL (11 Nov 2023 09:14)          RADIOLOGY & ADDITIONAL TESTS:            Imaging Personally Reviewed:  [ ] YES  [ ] NO    Consultants involved in case:   Consultant(s) Notes Reviewed:  [ ] YES  [ ] NO:   Care Discussed with Consultants/Other Providers [ ] YES  [ ] NO         Nate Gaffney MD  Internal Medicine, PGY-3    SATINDER KELLY  68y  MRN: [id number]    Patient is a 68y old  Female who presents with a chief complaint of     Subjective/Interval history/overnight events:  No acute events overnight. Patient reports non-productive cough this morning. Denies shortness of breath or chest pain. No other concerns at this time.       MEDICATIONS  (STANDING):  albuterol/ipratropium for Nebulization 3 milliLiter(s) Nebulizer every 6 hours  amLODIPine   Tablet 5 milliGRAM(s) Oral daily  ascorbic acid 500 milliGRAM(s) Oral daily  atorvastatin 80 milliGRAM(s) Oral at bedtime  azithromycin   Tablet 500 milliGRAM(s) Oral daily  carvedilol 6.25 milliGRAM(s) Oral every 12 hours  cefTRIAXone   IVPB 1000 milliGRAM(s) IV Intermittent every 24 hours  cholecalciferol 1000 Unit(s) Oral daily  dextrose 5%. 1000 milliLiter(s) (100 mL/Hr) IV Continuous <Continuous>  dextrose 5%. 1000 milliLiter(s) (50 mL/Hr) IV Continuous <Continuous>  dextrose 50% Injectable 25 Gram(s) IV Push once  dextrose 50% Injectable 12.5 Gram(s) IV Push once  dextrose 50% Injectable 25 Gram(s) IV Push once  glucagon  Injectable 1 milliGRAM(s) IntraMuscular once  insulin lispro (ADMELOG) corrective regimen sliding scale   SubCutaneous three times a day before meals  montelukast 10 milliGRAM(s) Oral daily  multivitamin 1 Tablet(s) Oral daily  predniSONE   Tablet 40 milliGRAM(s) Oral daily    MEDICATIONS  (PRN):  acetaminophen     Tablet .. 650 milliGRAM(s) Oral every 6 hours PRN Temp greater or equal to 38C (100.4F), Mild Pain (1 - 3)  albuterol    90 MICROgram(s) HFA Inhaler 2 Puff(s) Inhalation every 6 hours PRN Shortness of Breath and/or Wheezing  aluminum hydroxide/magnesium hydroxide/simethicone Suspension 30 milliLiter(s) Oral every 4 hours PRN Dyspepsia  dextrose Oral Gel 15 Gram(s) Oral once PRN Blood Glucose LESS THAN 70 milliGRAM(s)/deciliter  melatonin 3 milliGRAM(s) Oral at bedtime PRN Insomnia        Objective:    Vitals: Vital Signs Last 24 Hrs  T(C): 37.1 (11-11-23 @ 21:00), Max: 37.1 (11-11-23 @ 07:05)  T(F): 98.8 (11-11-23 @ 21:00), Max: 98.8 (11-11-23 @ 21:00)  HR: 90 (11-11-23 @ 23:02) (70 - 92)  BP: 119/58 (11-11-23 @ 21:00) (119/58 - 152/89)  BP(mean): 74 (11-11-23 @ 21:00) (74 - 74)  RR: 18 (11-11-23 @ 21:00) (18 - 18)  SpO2: 98% (11-11-23 @ 23:02) (98% - 100%)            I&O's Summary    10 Nov 2023 07:01  -  11 Nov 2023 07:00  --------------------------------------------------------  IN: 0 mL / OUT: 750 mL / NET: -750 mL        PHYSICAL EXAM:  GENERAL: NAD  HEENT: PERRL, no scleral icterus, no head and neck lad   CHEST/LUNG: diffuse wheezes  HEART: RRR, normal S1, S2, no murmurs, gallops, or rubs appreciated   ABDOMEN: soft, nondistended, non-tender, normoactive, no HSM, no rebound, no guarding, no rigidity  SKIN: No rashes or lesions  NERVOUS SYSTEM: Alert & Oriented X3  EXT: no peripheral edema  PSYCH: calm and cooperative     LABS:    11-11    141  |  103  |  25<H>  ----------------------------<  105<H>  4.1   |  26  |  1.32<H>  11-10    138  |  102  |  19  ----------------------------<  116<H>  4.9   |  20<L>  |  1.06  11-10    136  |  100  |  16  ----------------------------<  340<H>  5.5<H>   |  20<L>  |  0.81    Ca    9.2      11 Nov 2023 06:55  Ca    9.2      10 Nov 2023 13:21  Ca    9.1      10 Nov 2023 08:35  Phos  3.9     11-11  Mg     1.70     11-11    TPro  6.3  /  Alb  3.5  /  TBili  0.4  /  DBili  x   /  AST  15  /  ALT  16  /  AlkPhos  50  11-11  TPro  7.3  /  Alb  4.2  /  TBili  0.5  /  DBili  x   /  AST  22  /  ALT  20  /  AlkPhos  64  11-10    PT/INR - ( 10 Nov 2023 08:35 )   PT: 10.5 sec;   INR: 0.93 ratio         PTT - ( 11 Nov 2023 09:40 )  PTT:65.0 sec              Urinalysis Basic - ( 11 Nov 2023 06:55 )    Color: x / Appearance: x / SG: x / pH: x  Gluc: 105 mg/dL / Ketone: x  / Bili: x / Urobili: x   Blood: x / Protein: x / Nitrite: x   Leuk Esterase: x / RBC: x / WBC x   Sq Epi: x / Non Sq Epi: x / Bacteria: x                              13.3   13.69 )-----------( 212      ( 11 Nov 2023 06:55 )             40.5                         13.4   13.02 )-----------( 236      ( 11 Nov 2023 02:25 )             40.4                         15.0   19.81 )-----------( 309      ( 10 Nov 2023 08:35 )             46.2     CAPILLARY BLOOD GLUCOSE      POCT Blood Glucose.: 207 mg/dL (11 Nov 2023 21:15)  POCT Blood Glucose.: 240 mg/dL (11 Nov 2023 17:14)  POCT Blood Glucose.: 291 mg/dL (11 Nov 2023 12:48)  POCT Blood Glucose.: 123 mg/dL (11 Nov 2023 09:14)          RADIOLOGY & ADDITIONAL TESTS:            Imaging Personally Reviewed:  [ ] YES  [ ] NO    Consultants involved in case:   Consultant(s) Notes Reviewed:  [ ] YES  [ ] NO:   Care Discussed with Consultants/Other Providers [ ] YES  [ ] NO

## 2023-11-12 NOTE — CHART NOTE - NSCHARTNOTEFT_GEN_A_CORE
Brief pulmonary f/up note:    Seen at bedside; patient seated on RA, comfortable and speaking in full sentences. No wheezing on exam. Patient significantly improved since 11/10/23 with her HF treatment. Pending possible [facility] tomorrow, Monday.    Pulmonary to sign-off at this time. Thank you for this consult. Please call or Teams with any questions.     Crow Elise MD  Fellow, Pulmonary-Critical Care

## 2023-11-12 NOTE — PROGRESS NOTE ADULT - ATTENDING COMMENTS
68F with T2DM on oral agents, HFrEF, CAD c/b NSTEMI (s/p 2 stents ~4 years ago), asthma, who presents with dyspnea and was found to have acute hypoxemic respiratory failure likely 2/2 to CAP and asthma exacerbation, with suspected element of HF exacerbation as well.       #BRENDA:   -Currently 1.36 from 0.8 on admission  -likely multifactorial: suspect possibly cardiorenal in setting of CHF exacerbation with some hemodynamically mediated component in setting of hypertensive crisis upon admission  -stop lisinopril  -will give 1 x dose of Lasix 20 mg IV x 1 today  -monitor Cr        #Acute hypoxemic and hypercapnic respiratory failure:   -Resolved  -found to be hypoxic to 70s by EMS  -placed on BIPAP in ED, now improved and was able to be transitioned to 3 L NC  -PCO2 improved on BIPAP  -in setting of PNA, asthma exacerbation as above  -CAP coverage with CTX/Azithromcyin for suspected PNA  -sputum cx if able    #Asthma exacerbation:   -likely precipitated by PNA  -RVP negative  -pulm following: Prednisone 40 mg daily x 5 days    #CHF exacerbation:   -previously on nitro gtt, now successfully weaned off  -improved s/p BIPAP, now on NC  -s/p Lasix 40 mg IV in ED  -still with some JVD; will dose Lasix 20 mg IV x 1  -will re-evaluate volume status and dose diuretic accordingly  -Last TTE in 2020 with EF of 46%; will obtain repeat    #CAD:  -troponin elevated from 37 ---> 118 with mildly elevated CK. No active chest pain  -EKG changes; now with TWI in V4-V6  -pt has hx of STEMI in 2019 s/p 2 stents  -s/p ASA, plavix, and heparin gtt x 24 hours for suspected NSTEMI. Per cards, given downtrending trops, stopped heparin gtt -- suspect more likley demand ischemia  -cards still planning for likely ischemic eval with Cath on Monday      #T2DM:   -moderate dose ISS given expected steroid induced hyperglycemia  -f/u hgb A1C    Remainder of problems as above 68F with T2DM on oral agents, HFrEF, CAD c/b NSTEMI (s/p 2 stents ~4 years ago), asthma, who presents with dyspnea and was found to have acute hypoxemic respiratory failure likely 2/2 to CAP and asthma exacerbation, with suspected element of HF exacerbation as well.       #BRENDA:   -Currently 1.36 from 0.8 on admission  -likely multifactorial: suspect possibly cardiorenal in setting of CHF exacerbation with some hemodynamically mediated component in setting of hypertensive crisis upon admission  -stop lisinopril  -s/p Lasix 40 mg IV on admission followed by 20 mg IV  -nearly euvolemic, will initiate Lasix PO 40 mg daily  -monitor Cr        #Acute hypoxemic and hypercapnic respiratory failure:   -Resolved  -found to be hypoxic to 70s by EMS  -placed on BIPAP in ED, now improved and was able to be transitioned to 3 L NC  -PCO2 improved on BIPAP  -in setting of PNA, asthma exacerbation as above  -CAP coverage with CTX/Azithromcyin for suspected PNA  -sputum cx if able    #Asthma exacerbation:   -likely precipitated by PNA  -RVP negative  -pulm following: Prednisone 40 mg daily x 5 days    #CHF exacerbation:   -previously on nitro gtt, now successfully weaned off  -improved s/p BIPAP, now on NC  -s/p Lasix 40 mg IV in ED  -still with some JVD; will dose Lasix 20 mg IV x 1  -will re-evaluate volume status and dose diuretic accordingly  -Last TTE in 2020 with EF of 46%; will obtain repeat    #CAD:  -troponin elevated from 37 ---> 118 with mildly elevated CK. No active chest pain  -EKG changes; now with TWI in V4-V6  -pt has hx of STEMI in 2019 s/p 2 stents  -s/p ASA, plavix, and heparin gtt x 24 hours for suspected NSTEMI. Per cards, given downtrending trops, stopped heparin gtt -- suspect more likley demand ischemia  -cards still planning for likely ischemic eval with Cath on Monday      #T2DM:   -moderate dose ISS given expected steroid induced hyperglycemia  -f/u hgb A1C    Remainder of problems as above

## 2023-11-12 NOTE — CHART NOTE - NSCHARTNOTEFT_GEN_A_CORE
Consult received for "MST Score 2 or >".  Upon chart review, patient with stable weight, does not currently meet criteria for Protein Calorie Malnutrition risk per MST. RD remains available for assessment per protocol or as needed.  Funmi Rodriguez, ARELISN, CDN #[id number]  Also available on [employer] Teams

## 2023-11-13 ENCOUNTER — TRANSCRIPTION ENCOUNTER (OUTPATIENT)
Age: 68
End: 2023-11-13

## 2023-11-13 LAB
ANION GAP SERPL CALC-SCNC: 12 MMOL/L — SIGNIFICANT CHANGE UP (ref 7–14)
ANION GAP SERPL CALC-SCNC: 12 MMOL/L — SIGNIFICANT CHANGE UP (ref 7–14)
BUN SERPL-MCNC: 30 MG/DL — HIGH (ref 7–23)
BUN SERPL-MCNC: 30 MG/DL — HIGH (ref 7–23)
CALCIUM SERPL-MCNC: 9.2 MG/DL — SIGNIFICANT CHANGE UP (ref 8.4–10.5)
CALCIUM SERPL-MCNC: 9.2 MG/DL — SIGNIFICANT CHANGE UP (ref 8.4–10.5)
CHLORIDE SERPL-SCNC: 104 MMOL/L — SIGNIFICANT CHANGE UP (ref 98–107)
CHLORIDE SERPL-SCNC: 104 MMOL/L — SIGNIFICANT CHANGE UP (ref 98–107)
CO2 SERPL-SCNC: 27 MMOL/L — SIGNIFICANT CHANGE UP (ref 22–31)
CO2 SERPL-SCNC: 27 MMOL/L — SIGNIFICANT CHANGE UP (ref 22–31)
CREAT SERPL-MCNC: 0.98 MG/DL — SIGNIFICANT CHANGE UP (ref 0.5–1.3)
CREAT SERPL-MCNC: 0.98 MG/DL — SIGNIFICANT CHANGE UP (ref 0.5–1.3)
EGFR: 64 ML/MIN/1.73M2 — SIGNIFICANT CHANGE UP
EGFR: 64 ML/MIN/1.73M2 — SIGNIFICANT CHANGE UP
GLUCOSE BLDC GLUCOMTR-MCNC: 120 MG/DL — HIGH (ref 70–99)
GLUCOSE BLDC GLUCOMTR-MCNC: 120 MG/DL — HIGH (ref 70–99)
GLUCOSE BLDC GLUCOMTR-MCNC: 147 MG/DL — HIGH (ref 70–99)
GLUCOSE BLDC GLUCOMTR-MCNC: 147 MG/DL — HIGH (ref 70–99)
GLUCOSE BLDC GLUCOMTR-MCNC: 219 MG/DL — HIGH (ref 70–99)
GLUCOSE BLDC GLUCOMTR-MCNC: 219 MG/DL — HIGH (ref 70–99)
GLUCOSE BLDC GLUCOMTR-MCNC: 310 MG/DL — HIGH (ref 70–99)
GLUCOSE BLDC GLUCOMTR-MCNC: 310 MG/DL — HIGH (ref 70–99)
GLUCOSE BLDC GLUCOMTR-MCNC: 376 MG/DL — HIGH (ref 70–99)
GLUCOSE BLDC GLUCOMTR-MCNC: 376 MG/DL — HIGH (ref 70–99)
GLUCOSE SERPL-MCNC: 111 MG/DL — HIGH (ref 70–99)
GLUCOSE SERPL-MCNC: 111 MG/DL — HIGH (ref 70–99)
HCT VFR BLD CALC: 36.6 % — SIGNIFICANT CHANGE UP (ref 34.5–45)
HCT VFR BLD CALC: 36.6 % — SIGNIFICANT CHANGE UP (ref 34.5–45)
HGB BLD-MCNC: 11.8 G/DL — SIGNIFICANT CHANGE UP (ref 11.5–15.5)
HGB BLD-MCNC: 11.8 G/DL — SIGNIFICANT CHANGE UP (ref 11.5–15.5)
LEGIONELLA AG UR QL: NEGATIVE — SIGNIFICANT CHANGE UP
LEGIONELLA AG UR QL: NEGATIVE — SIGNIFICANT CHANGE UP
MCHC RBC-ENTMCNC: 28.7 PG — SIGNIFICANT CHANGE UP (ref 27–34)
MCHC RBC-ENTMCNC: 28.7 PG — SIGNIFICANT CHANGE UP (ref 27–34)
MCHC RBC-ENTMCNC: 32.2 GM/DL — SIGNIFICANT CHANGE UP (ref 32–36)
MCHC RBC-ENTMCNC: 32.2 GM/DL — SIGNIFICANT CHANGE UP (ref 32–36)
MCV RBC AUTO: 89.1 FL — SIGNIFICANT CHANGE UP (ref 80–100)
MCV RBC AUTO: 89.1 FL — SIGNIFICANT CHANGE UP (ref 80–100)
NRBC # BLD: 0 /100 WBCS — SIGNIFICANT CHANGE UP (ref 0–0)
NRBC # BLD: 0 /100 WBCS — SIGNIFICANT CHANGE UP (ref 0–0)
NRBC # FLD: 0 K/UL — SIGNIFICANT CHANGE UP (ref 0–0)
NRBC # FLD: 0 K/UL — SIGNIFICANT CHANGE UP (ref 0–0)
PLATELET # BLD AUTO: 196 K/UL — SIGNIFICANT CHANGE UP (ref 150–400)
PLATELET # BLD AUTO: 196 K/UL — SIGNIFICANT CHANGE UP (ref 150–400)
POTASSIUM SERPL-MCNC: 3.8 MMOL/L — SIGNIFICANT CHANGE UP (ref 3.5–5.3)
POTASSIUM SERPL-MCNC: 3.8 MMOL/L — SIGNIFICANT CHANGE UP (ref 3.5–5.3)
POTASSIUM SERPL-SCNC: 3.8 MMOL/L — SIGNIFICANT CHANGE UP (ref 3.5–5.3)
POTASSIUM SERPL-SCNC: 3.8 MMOL/L — SIGNIFICANT CHANGE UP (ref 3.5–5.3)
RBC # BLD: 4.11 M/UL — SIGNIFICANT CHANGE UP (ref 3.8–5.2)
RBC # BLD: 4.11 M/UL — SIGNIFICANT CHANGE UP (ref 3.8–5.2)
RBC # FLD: 14.1 % — SIGNIFICANT CHANGE UP (ref 10.3–14.5)
RBC # FLD: 14.1 % — SIGNIFICANT CHANGE UP (ref 10.3–14.5)
SODIUM SERPL-SCNC: 143 MMOL/L — SIGNIFICANT CHANGE UP (ref 135–145)
SODIUM SERPL-SCNC: 143 MMOL/L — SIGNIFICANT CHANGE UP (ref 135–145)
WBC # BLD: 9.46 K/UL — SIGNIFICANT CHANGE UP (ref 3.8–10.5)
WBC # BLD: 9.46 K/UL — SIGNIFICANT CHANGE UP (ref 3.8–10.5)
WBC # FLD AUTO: 9.46 K/UL — SIGNIFICANT CHANGE UP (ref 3.8–10.5)
WBC # FLD AUTO: 9.46 K/UL — SIGNIFICANT CHANGE UP (ref 3.8–10.5)

## 2023-11-13 PROCEDURE — 93010 ELECTROCARDIOGRAM REPORT: CPT

## 2023-11-13 PROCEDURE — 92928 PRQ TCAT PLMT NTRAC ST 1 LES: CPT | Mod: RI

## 2023-11-13 PROCEDURE — 99233 SBSQ HOSP IP/OBS HIGH 50: CPT | Mod: GC

## 2023-11-13 PROCEDURE — 99233 SBSQ HOSP IP/OBS HIGH 50: CPT

## 2023-11-13 PROCEDURE — 93454 CORONARY ARTERY ANGIO S&I: CPT | Mod: 26,59

## 2023-11-13 PROCEDURE — 99152 MOD SED SAME PHYS/QHP 5/>YRS: CPT

## 2023-11-13 PROCEDURE — 93306 TTE W/DOPPLER COMPLETE: CPT | Mod: 26

## 2023-11-13 RX ORDER — CLOPIDOGREL BISULFATE 75 MG/1
600 TABLET, FILM COATED ORAL ONCE
Refills: 0 | Status: COMPLETED | OUTPATIENT
Start: 2023-11-14 | End: 2023-11-14

## 2023-11-13 RX ORDER — ASPIRIN/CALCIUM CARB/MAGNESIUM 324 MG
81 TABLET ORAL DAILY
Refills: 0 | Status: DISCONTINUED | OUTPATIENT
Start: 2023-11-13 | End: 2023-11-14

## 2023-11-13 RX ORDER — SODIUM CHLORIDE 9 MG/ML
1000 INJECTION INTRAMUSCULAR; INTRAVENOUS; SUBCUTANEOUS
Refills: 0 | Status: DISCONTINUED | OUTPATIENT
Start: 2023-11-13 | End: 2023-11-14

## 2023-11-13 RX ORDER — TICAGRELOR 90 MG/1
90 TABLET ORAL EVERY 12 HOURS
Refills: 0 | Status: DISCONTINUED | OUTPATIENT
Start: 2023-11-13 | End: 2023-11-13

## 2023-11-13 RX ORDER — CLOPIDOGREL BISULFATE 75 MG/1
75 TABLET, FILM COATED ORAL DAILY
Refills: 0 | Status: DISCONTINUED | OUTPATIENT
Start: 2023-11-15 | End: 2023-11-14

## 2023-11-13 RX ORDER — TICAGRELOR 90 MG/1
90 TABLET ORAL ONCE
Refills: 0 | Status: COMPLETED | OUTPATIENT
Start: 2023-11-13 | End: 2023-11-14

## 2023-11-13 RX ORDER — TICAGRELOR 90 MG/1
1 TABLET ORAL
Qty: 60 | Refills: 0
Start: 2023-11-13 | End: 2023-12-12

## 2023-11-13 RX ADMIN — Medication 4 UNIT(S): at 13:47

## 2023-11-13 RX ADMIN — BUDESONIDE AND FORMOTEROL FUMARATE DIHYDRATE 2 PUFF(S): 160; 4.5 AEROSOL RESPIRATORY (INHALATION) at 21:39

## 2023-11-13 RX ADMIN — CEFTRIAXONE 100 MILLIGRAM(S): 500 INJECTION, POWDER, FOR SOLUTION INTRAMUSCULAR; INTRAVENOUS at 14:44

## 2023-11-13 RX ADMIN — Medication 4 UNIT(S): at 16:11

## 2023-11-13 RX ADMIN — CARVEDILOL PHOSPHATE 6.25 MILLIGRAM(S): 80 CAPSULE, EXTENDED RELEASE ORAL at 06:39

## 2023-11-13 RX ADMIN — MONTELUKAST 10 MILLIGRAM(S): 4 TABLET, CHEWABLE ORAL at 18:41

## 2023-11-13 RX ADMIN — CARVEDILOL PHOSPHATE 6.25 MILLIGRAM(S): 80 CAPSULE, EXTENDED RELEASE ORAL at 18:41

## 2023-11-13 RX ADMIN — Medication 3 MILLILITER(S): at 16:57

## 2023-11-13 RX ADMIN — SODIUM CHLORIDE 75 MILLILITER(S): 9 INJECTION INTRAMUSCULAR; INTRAVENOUS; SUBCUTANEOUS at 13:05

## 2023-11-13 RX ADMIN — Medication 40 MILLIGRAM(S): at 06:38

## 2023-11-13 RX ADMIN — Medication 4 UNIT(S): at 08:41

## 2023-11-13 RX ADMIN — Medication 10: at 12:07

## 2023-11-13 RX ADMIN — ATORVASTATIN CALCIUM 80 MILLIGRAM(S): 80 TABLET, FILM COATED ORAL at 21:48

## 2023-11-13 RX ADMIN — Medication 1 TABLET(S): at 18:42

## 2023-11-13 RX ADMIN — AMLODIPINE BESYLATE 5 MILLIGRAM(S): 2.5 TABLET ORAL at 06:38

## 2023-11-13 RX ADMIN — Medication 500 MILLIGRAM(S): at 18:41

## 2023-11-13 RX ADMIN — Medication 3 MILLILITER(S): at 21:39

## 2023-11-13 RX ADMIN — Medication 6: at 16:08

## 2023-11-13 RX ADMIN — Medication 1000 UNIT(S): at 18:41

## 2023-11-13 RX ADMIN — Medication 3 MILLILITER(S): at 05:08

## 2023-11-13 RX ADMIN — AZITHROMYCIN 500 MILLIGRAM(S): 500 TABLET, FILM COATED ORAL at 14:44

## 2023-11-13 RX ADMIN — BUDESONIDE AND FORMOTEROL FUMARATE DIHYDRATE 2 PUFF(S): 160; 4.5 AEROSOL RESPIRATORY (INHALATION) at 08:41

## 2023-11-13 NOTE — PROGRESS NOTE ADULT - TIME BILLING
Review of laboratory data, radiology results, consultants' recommendations, documentation in [place], discussion with patient/house staff and interdisciplinary staff (such as , social workers, etc). Interventions were performed as documented above.
Review of laboratory data, radiology results, consultants' recommendations, documentation in [place], discussion with patient/house staff and interdisciplinary staff (such as , social workers, etc). Interventions were performed as documented above.
Review of laboratory data, radiology results, consultants' recommendations, documentation in [place], discussion with patient/house staff and interdisciplinary staff (such as , social workers, etc). Interventions were performed as documented above.

## 2023-11-13 NOTE — PROGRESS NOTE ADULT - ATTENDING COMMENTS
personally saw and examined patient  labs and vital reviewed  agree with above assessment and plan  s/p pci today  currently well appearing, no cp or sob  cont tele

## 2023-11-13 NOTE — PROGRESS NOTE ADULT - ATTENDING COMMENTS
68F with T2DM on oral agents, HFrEF, CAD c/b NSTEMI (s/p 2 stents ~4 years ago), asthma, who presents with dyspnea and was found to have acute hypoxemic respiratory failure likely 2/2 to CAP and asthma exacerbation, with suspected element of HF exacerbation as well.       #BRENDA:   -improved today  -likely multifactorial: suspect possibly cardiorenal in setting of CHF exacerbation with some hemodynamically mediated component in setting of hypertensive crisis upon admission  -stop lisinopril  -initiate Lasix PO 40 mg daily  -monitor Cr    #Acute hypoxemic and hypercapnic respiratory failure:   -Resolved  -found to be hypoxic to 70s by EMS  -placed on BIPAP in ED, now improved and was able to be transitioned to 3 L NC  -PCO2 improved on BIPAP  -in setting of PNA, asthma exacerbation as above  -CAP coverage with CTX/Azithromcyin for suspected PNA  -EF 45-50% - lasix 40 daily    #Asthma exacerbation:   -likely precipitated by PNA  -RVP negative  -pulm following: Prednisone 40 mg daily x 5 days    #CHF exacerbation:   -previously on nitro gtt, now successfully weaned off  -improved s/p BIPAP, now on NC  -s/p Lasix 40 mg IV in ED  -lasix 40 daily PO    #CAD:  -troponin elevated from 37 ---> 118 with mildly elevated CK. No active chest pain  -EKG changes; now with TWI in V4-V6  -pt has hx of STEMI in 2019 s/p 2 stents  -s/p pci to ramus, start asa, plavix per cards       #T2DM:   -moderate dose ISS given expected steroid induced hyperglycemia      Remainder of problems as above

## 2023-11-13 NOTE — DISCHARGE NOTE PROVIDER - NSDCCPCAREPLAN_GEN_ALL_CORE_FT
PRINCIPAL DISCHARGE DIAGNOSIS  Diagnosis: Acute heart failure  Assessment and Plan of Treatment: You came in for difficulty breathing that started suddenly after you cleaned the house. Based on imaging and lab studies, we were suspecting several things that could have led to this decline. The most likely one is that due to the EKG changed that we saw and elevated cardiac markers, you could have experienced a cardiac event and your clinical status imrpved after nitro drop and BIPAP. You received a stent in over of the heart vessel and an ultrasound of the heart, which did not show any significant changes since your last ulstrasound.   Another cause we considered was an asthma flare and you were started on prednisone 40 mg PO for 5 days. Given     PRINCIPAL DISCHARGE DIAGNOSIS  Diagnosis: Acute heart failure  Assessment and Plan of Treatment: You came in for difficulty breathing that started suddenly after you cleaned the house. Based on imaging and lab studies, we were suspecting several things that could have led to this decline. The most likely one is that due to the EKG changed that we saw and elevated cardiac markers, you could have experienced a cardiac event and your clinical status imrpved after nitro drop and BIPAP. You received a stent in over of the heart vessel and an ultrasound of the heart, which redemonstrated heart failure and were started on lasix 40 mg daily. You were also started on plavix 75 mg daily and will continue with the rest of your heart failure   Another cause we considered was an asthma flare and you were started on prednisone 40 mg PO for 5 days. Given     PRINCIPAL DISCHARGE DIAGNOSIS  Diagnosis: Acute respiratory failure with hypoxia and hypercapnia  Assessment and Plan of Treatment: You came in for difficulty breathing that started suddenly after you cleaned the house. Based on imaging and lab studies, we were suspecting several things that could have caused you to get short of breath. The most likely one is that due to the EKG changed that we saw and elevated cardiac markers, you could have experienced a cardiac event and your clinical status imrpved after nitro drop and BIPAP. You also received a stent in one of the heart vessel and an ultrasound of the heart, which redemonstrated heart failure and were started on lasix 40 mg daily. You were also started on plavix 75 mg daily and will continue with the rest of your heart failure, including lisinopril and carvedilol.   Another cause of shortness of breath we considered was an asthma flare and you were given prednisone 40 mg PO for 5 days. Given concern for lung infection, we also treated you with antibiotics for community acquired pneumonia.   Please follow up with you cardiologist after discharge. Please also see your PCP within a week after discahrge.   We emailed a pulmonology service to set up an appointment for you as well.   If you experience any chest pain, sudden shortness of breath, leg swelling, please come back to the nearest emergency department.     PRINCIPAL DISCHARGE DIAGNOSIS  Diagnosis: Acute respiratory failure with hypoxia and hypercapnia  Assessment and Plan of Treatment: You came in for difficulty breathing that started suddenly after you cleaned the house. Based on imaging and lab studies, we were suspecting several things that could have caused you to get short of breath. The most likely one is that due to the EKG changed that we saw and elevated cardiac markers, you could have experienced a cardiac event and your clinical status imrpved after nitro drop and BIPAP. You also received a stent in one of the heart vessel and an ultrasound of the heart, which redemonstrated heart failure and were started on lasix 40 mg daily. You were also started on plavix 75 mg daily and will continue with the rest of your heart failure, including lisinopril and carvedilol.   Another cause of shortness of breath we considered was an asthma flare and you were given prednisone 40 mg PO for 5 days. Given concern for lung infection, we also treated you with antibiotics for community acquired pneumonia.   Please follow up with you cardiologist after discharge. Please also see your PCP within a week after discharge.   We emailed a pulmonology service to set up an appointment for you as well. They scheduled a virtual appointment for you with Dr. Strong on 11/16 at 2:30 pm.   If you experience any chest pain, sudden shortness of breath, leg swelling, please come back to the nearest emergency department.

## 2023-11-13 NOTE — DISCHARGE NOTE PROVIDER - NSDCCPTREATMENT_GEN_ALL_CORE_FT
PRINCIPAL PROCEDURE  Procedure: Transthoracic echo  Findings and Treatment: CONCLUSIONS:      1. Left ventricular cavityis normal. Left ventricular wall thickness is normal. Left ventricular systolic function is mildly decreased with an ejection fraction visually estimated at 45 to 50 %. Regional wall motion abnormalities present.   2. There is hypokinesis of the inferolateral wall.   3. Normal left ventricular diastolic function, with normal filling pressure.   4. Normal right ventricular cavity size, wall thickness, and systolic function.   5. Structurally normal mitral valve with normal leaflet excursion. Thereis calcification of the mitral valve annulus. There is no mitral valve stenosis. There is mild to moderate mitral regurgitation.   6. No pericardial effusion seen.   7. The inferior vena cava is normal in size measuring 1.60 cm in diameter, (normal <2.1cm) with normal inspiratory collapse (normal >50%) consistent with normal right atrial pressure (~3, range 0-5mmHg).   8. Technically difficult image quality.        SECONDARY PROCEDURE  Procedure: Chest xray, PA & lateral  Findings and Treatment: FINDINGS:  Lines/Tubes/Devices: None.  Lungs/Pleura: Bilateral hilar and bibasilar fluffy opacities. No   pneumothorax. Small bilateral pleural effusions.  Heart/Mediastinum: The cardiomediastinal silhouette is within normal   limits. Aortic calcified atherosclerosis.  Visualized Abdomen: Unremarkable visualized upper abdomen.  Osseous Structures: No acute fracture. No aggressive osseous lesions.  IMPRESSION:  Bilateral hilar and bibasilar fluffy opacities with small bilateral   pleural effusions. Findings concerning for mild pulmonary edema. Please   correlate with patient's clinical picture.

## 2023-11-13 NOTE — DISCHARGE NOTE PROVIDER - NSDCMRMEDTOKEN_GEN_ALL_CORE_FT
Advair Diskus 250 mcg-50 mcg inhalation powder: 2 puff(s) inhaled once a day in the morning  amLODIPine 5 mg oral tablet: 1 tab(s) orally once a day  aspirin 81 mg oral delayed release tablet: 1 tab(s) orally once a day  atorvastatin 80 mg oral tablet: 1 tab(s) orally once a day  Brilinta (ticagrelor) 90 mg oral tablet: 1 tab(s) orally 2 times a day  carvedilol 6.25 mg oral tablet: 1 tab(s) orally 2 times a day  hydroCHLOROthiazide 50 mg oral tablet: 1 tab(s) orally once a day  lisinopril 40 mg oral tablet: 1 tab(s) orally once a day  montelukast 10 mg oral tablet: 1 tab(s) orally once a day (at bedtime)  Multiple Vitamins oral tablet: 1 tab(s) orally once a day  Ventolin HFA 90 mcg/inh inhalation aerosol: 2 puff(s) inhaled every 6 hours, As Needed  Vitamin C 500 mg oral capsule: 1 cap(s) orally once a day  Vitamin D3 1000 intl units (25 mcg) oral capsule: 1 tab(s) orally once a day   Advair Diskus 250 mcg-50 mcg inhalation powder: 2 puff(s) inhaled once a day in the morning  amLODIPine 5 mg oral tablet: 1 tab(s) orally once a day  aspirin 81 mg oral delayed release tablet: 1 tab(s) orally once a day  atorvastatin 80 mg oral tablet: 1 tab(s) orally once a day  carvedilol 6.25 mg oral tablet: 1 tab(s) orally 2 times a day  Glucovance 5 mg-500 mg oral tablet: 1 tab(s) orally once a day  hydroCHLOROthiazide 50 mg oral tablet: 1 tab(s) orally once a day  lisinopril 40 mg oral tablet: 1 tab(s) orally once a day  montelukast 10 mg oral tablet: 1 tab(s) orally once a day (at bedtime)  Multiple Vitamins oral tablet: 1 tab(s) orally once a day  Ventolin HFA 90 mcg/inh inhalation aerosol: 2 puff(s) inhaled every 6 hours, As Needed  Vitamin C 500 mg oral capsule: 1 cap(s) orally once a day  Vitamin D3 1000 intl units (25 mcg) oral capsule: 1 tab(s) orally once a day   Advair Diskus 250 mcg-50 mcg inhalation powder: 2 puff(s) inhaled once a day in the morning  amLODIPine 5 mg oral tablet: 1 tab(s) orally once a day  aspirin 81 mg oral delayed release tablet: 1 tab(s) orally once a day  atorvastatin 80 mg oral tablet: 1 tab(s) orally once a day  budesonide-formoterol 80 mcg-4.5 mcg/inh inhalation aerosol: 2 puff(s) inhaled 2 times a day  carvedilol 6.25 mg oral tablet: 1 tab(s) orally 2 times a day  Entresto 24 mg-26 mg oral tablet: 1 tab(s) orally 2 times a day  furosemide 40 mg oral tablet: 1 tab(s) orally once a day  Glucovance 5 mg-500 mg oral tablet: 1 tab(s) orally 2 times a day  hydroCHLOROthiazide 50 mg oral tablet: 1 tab(s) orally once a day  lisinopril 40 mg oral tablet: 1 tab(s) orally once a day  montelukast 10 mg oral tablet: 1 tab(s) orally once a day (at bedtime)  Plavix 75 mg oral tablet: 1 tab(s) orally once a day  Ventolin HFA 90 mcg/inh inhalation aerosol: 2 puff(s) inhaled every 6 hours, As Needed  Vitamin C 500 mg oral capsule: 1 cap(s) orally once a day  Vitamin D3 1000 intl units (25 mcg) oral capsule: 1 tab(s) orally once a day   Advair Diskus 250 mcg-50 mcg inhalation powder: 2 puff(s) inhaled once a day in the morning  amLODIPine 5 mg oral tablet: 1 tab(s) orally once a day  aspirin 81 mg oral delayed release tablet: 1 tab(s) orally once a day  atorvastatin 80 mg oral tablet: 1 tab(s) orally once a day  budesonide-formoterol 80 mcg-4.5 mcg/inh inhalation aerosol: 2 puff(s) inhaled 2 times a day  carvedilol 6.25 mg oral tablet: 1 tab(s) orally 2 times a day  furosemide 40 mg oral tablet: 1 tab(s) orally once a day  Glucovance 5 mg-500 mg oral tablet: 1 tab(s) orally 2 times a day  hydroCHLOROthiazide 50 mg oral tablet: 1 tab(s) orally once a day  lisinopril 40 mg oral tablet: 1 tab(s) orally once a day  montelukast 10 mg oral tablet: 1 tab(s) orally once a day (at bedtime)  Plavix 75 mg oral tablet: 1 tab(s) orally once a day  Ventolin HFA 90 mcg/inh inhalation aerosol: 2 puff(s) inhaled every 6 hours, As Needed  Vitamin C 500 mg oral capsule: 1 cap(s) orally once a day  Vitamin D3 1000 intl units (25 mcg) oral capsule: 1 tab(s) orally once a day

## 2023-11-13 NOTE — PROGRESS NOTE ADULT - SUBJECTIVE AND OBJECTIVE BOX
Overnight Events: NAEO    Review Of Systems: Improving SOB, no chest pain or palpitations            Current Meds:  acetaminophen     Tablet .. 650 milliGRAM(s) Oral every 6 hours PRN  albuterol    90 MICROgram(s) HFA Inhaler 2 Puff(s) Inhalation every 6 hours PRN  albuterol/ipratropium for Nebulization 3 milliLiter(s) Nebulizer every 6 hours  aluminum hydroxide/magnesium hydroxide/simethicone Suspension 30 milliLiter(s) Oral every 4 hours PRN  amLODIPine   Tablet 5 milliGRAM(s) Oral daily  ascorbic acid 500 milliGRAM(s) Oral daily  aspirin enteric coated 81 milliGRAM(s) Oral daily  atorvastatin 80 milliGRAM(s) Oral at bedtime  azithromycin   Tablet 500 milliGRAM(s) Oral daily  budesonide  80 MICROgram(s)/formoterol 4.5 MICROgram(s) Inhaler 2 Puff(s) Inhalation two times a day  carvedilol 6.25 milliGRAM(s) Oral every 12 hours  cefTRIAXone   IVPB 1000 milliGRAM(s) IV Intermittent every 24 hours  cholecalciferol 1000 Unit(s) Oral daily  dextrose 5%. 1000 milliLiter(s) IV Continuous <Continuous>  dextrose 5%. 1000 milliLiter(s) IV Continuous <Continuous>  dextrose 50% Injectable 25 Gram(s) IV Push once  dextrose 50% Injectable 12.5 Gram(s) IV Push once  dextrose 50% Injectable 25 Gram(s) IV Push once  dextrose Oral Gel 15 Gram(s) Oral once PRN  furosemide    Tablet 40 milliGRAM(s) Oral daily  glucagon  Injectable 1 milliGRAM(s) IntraMuscular once  insulin lispro (ADMELOG) corrective regimen sliding scale   SubCutaneous three times a day before meals  insulin lispro Injectable (ADMELOG) 4 Unit(s) SubCutaneous three times a day before meals  melatonin 3 milliGRAM(s) Oral at bedtime PRN  montelukast 10 milliGRAM(s) Oral daily  multivitamin 1 Tablet(s) Oral daily  predniSONE   Tablet 40 milliGRAM(s) Oral daily  sodium chloride 0.9%. 1000 milliLiter(s) IV Continuous <Continuous>  ticagrelor 90 milliGRAM(s) Oral once      Vitals:  T(F): 98.3 (11-13), Max: 98.3 (11-13)  HR: 66 (11-13) (61 - 77)  BP: 142/78 (11-13) (131/89 - 163/94)  RR: 18 (11-13)  SpO2: 99% (11-13)  I&O's Summary      Physical Exam:  GEN: comfortable appearing, lying in bed in NAD  HEENT: NCAT, MMM  CV: Regular S1, S2, no m/r/g  RESP: CTAB  ABD: Soft, NTND, +BS  EXT: No LE edema, WWP, pulses palpable throughout  NEURO: No focal deficits, AOx3  SKIN:  No rashes                          11.8   9.46  )-----------( 196      ( 13 Nov 2023 05:36 )             36.6     11-13    143  |  104  |  30<H>  ----------------------------<  111<H>  3.8   |  27  |  0.98    Ca    9.2      13 Nov 2023 05:36      PTT - ( 12 Nov 2023 05:52 )  PTT:24.7 sec  CARDIAC MARKERS ( 11 Nov 2023 02:25 )  105 ng/L / x     / x     / 180 U/L / x     / 7.1 ng/mL  CARDIAC MARKERS ( 10 Nov 2023 20:30 )  101 ng/L / x     / x     / x     / x     / 7.0 ng/mL  CARDIAC MARKERS ( 10 Nov 2023 13:21 )  118 ng/L / x     / x     / 190 U/L / x     / 4.9 ng/mL  CARDIAC MARKERS ( 10 Nov 2023 08:35 )  37 ng/L / x     / x     / x     / x     / x

## 2023-11-13 NOTE — DISCHARGE NOTE PROVIDER - NSDCFUSCHEDAPPT_GEN_ALL_CORE_FT
Vicki Strong  [facility]  [facility] [address] [place] [address]  Scheduled Appointment: 11/16/2023

## 2023-11-13 NOTE — CHART NOTE - NSCHARTNOTEFT_GEN_A_CORE
The patient is s/p PCI to Ramus, was given Brilinta load in cath lab, however, the patient cannot be on Brilinta due to insurance coverage. Dr. Brown was made aware, recommended to give Brilinta 90 mg 1 tab tonight, then Plavix 600 mg load on 11/14/23 and then start Plavix 75 mg daily for 11/15/23.

## 2023-11-13 NOTE — PROGRESS NOTE ADULT - PROBLEM SELECTOR PLAN 2
Echo 2019 with EF 40% with inferior and basal to mid inferolateral wall hypokinesis.  - f/u TTE  - continue carvedilol 6.25 mg BID PO  - c/w home lisinopril 40 daily  - c/w home atorvastatin 80 mg daily   - Lipid panel   - TSH /w reflex Echo 2019 with EF 40% with inferior and basal to mid inferolateral wall hypokinesis.  TTE with EF 40-45%  - continue carvedilol 6.25 mg BID PO  - c/w home lisinopril 40 daily  - c/w home atorvastatin 80 mg daily

## 2023-11-13 NOTE — DISCHARGE NOTE PROVIDER - NSDCDCMDCOMP_GEN_ALL_CORE
SATs initiated at 0713 with Propofol and Fentanyl stopped. Pt is able to follow commands, slightly agitated but redirectable. Brother Bryon was able to talk to the patient over the phone to keep pt calm.    This document is complete and the patient is ready for discharge.

## 2023-11-13 NOTE — PROGRESS NOTE ADULT - SUBJECTIVE AND OBJECTIVE BOX
Delia Velasco MD    Internal Medicine Resident (PGY-1)  ___________________________________________________________________________________________________      SATINDER KELLY 68y Female    Overnight events/subjective: No acute overnight events. Patient seen and examined at bedside. No complaints. Denies fever, chills, chest pain, shortness of breath, abdominal pain, nausea, vomiting, changes in bowel habits, or urinary symptoms.    Vital Signs Last 24 Hrs  T(C): 36.6 (12 Nov 2023 21:53), Max: 36.6 (12 Nov 2023 18:22)  T(F): 97.9 (12 Nov 2023 21:53), Max: 97.9 (12 Nov 2023 21:53)  HR: 61 (12 Nov 2023 21:53) (58 - 77)  BP: 131/89 (12 Nov 2023 21:53) (131/89 - 163/94)  BP(mean): --  RR: 18 (12 Nov 2023 21:53) (18 - 18)  SpO2: 100% (12 Nov 2023 21:53) (98% - 100%)    Parameters below as of 12 Nov 2023 21:53  Patient On (Oxygen Delivery Method): room air      PHYSICAL EXAM:  GENERAL: NAD  HEENT: PERRL, no scleral icterus, no head and neck lad   CHEST/LUNG: diffuse wheezes  HEART: RRR, normal S1, S2, no murmurs, gallops, or rubs appreciated   ABDOMEN: soft, nondistended, non-tender, normoactive, no HSM, no rebound, no guarding, no rigidity  SKIN: No rashes or lesions  NERVOUS SYSTEM: Alert & Oriented X3  EXT: no peripheral edema  PSYCH: calm and cooperative       HOSPITAL MEDICATIONS:  MEDICATIONS  (STANDING):  albuterol/ipratropium for Nebulization 3 milliLiter(s) Nebulizer every 6 hours  amLODIPine   Tablet 5 milliGRAM(s) Oral daily  ascorbic acid 500 milliGRAM(s) Oral daily  atorvastatin 80 milliGRAM(s) Oral at bedtime  azithromycin   Tablet 500 milliGRAM(s) Oral daily  budesonide  80 MICROgram(s)/formoterol 4.5 MICROgram(s) Inhaler 2 Puff(s) Inhalation two times a day  carvedilol 6.25 milliGRAM(s) Oral every 12 hours  cefTRIAXone   IVPB 1000 milliGRAM(s) IV Intermittent every 24 hours  cholecalciferol 1000 Unit(s) Oral daily  dextrose 5%. 1000 milliLiter(s) (50 mL/Hr) IV Continuous <Continuous>  dextrose 5%. 1000 milliLiter(s) (100 mL/Hr) IV Continuous <Continuous>  dextrose 50% Injectable 25 Gram(s) IV Push once  dextrose 50% Injectable 12.5 Gram(s) IV Push once  dextrose 50% Injectable 25 Gram(s) IV Push once  furosemide    Tablet 40 milliGRAM(s) Oral daily  glucagon  Injectable 1 milliGRAM(s) IntraMuscular once  insulin lispro (ADMELOG) corrective regimen sliding scale   SubCutaneous three times a day before meals  insulin lispro Injectable (ADMELOG) 4 Unit(s) SubCutaneous three times a day before meals  montelukast 10 milliGRAM(s) Oral daily  multivitamin 1 Tablet(s) Oral daily  predniSONE   Tablet 40 milliGRAM(s) Oral daily    MEDICATIONS  (PRN):  acetaminophen     Tablet .. 650 milliGRAM(s) Oral every 6 hours PRN Temp greater or equal to 38C (100.4F), Mild Pain (1 - 3)  albuterol    90 MICROgram(s) HFA Inhaler 2 Puff(s) Inhalation every 6 hours PRN Shortness of Breath and/or Wheezing  aluminum hydroxide/magnesium hydroxide/simethicone Suspension 30 milliLiter(s) Oral every 4 hours PRN Dyspepsia  dextrose Oral Gel 15 Gram(s) Oral once PRN Blood Glucose LESS THAN 70 milliGRAM(s)/deciliter  melatonin 3 milliGRAM(s) Oral at bedtime PRN Insomnia      LABS:                        11.8   9.46  )-----------( 196      ( 13 Nov 2023 05:36 )             36.6     11-13    143  |  104  |  30<H>  ----------------------------<  111<H>  3.8   |  27  |  0.98    Ca    9.2      13 Nov 2023 05:36      PTT - ( 12 Nov 2023 05:52 )  PTT:24.7 sec  Urinalysis Basic - ( 13 Nov 2023 05:36 )    Color: x / Appearance: x / SG: x / pH: x  Gluc: 111 mg/dL / Ketone: x  / Bili: x / Urobili: x   Blood: x / Protein: x / Nitrite: x   Leuk Esterase: x / RBC: x / WBC x   Sq Epi: x / Non Sq Epi: x / Bacteria: x         Delia Velasco MD    Internal Medicine Resident (PGY-1)  ___________________________________________________________________________________________________      SATINDER KELLY 68y Female    Overnight events/subjective: No acute overnight events. Patient seen and examined at bedside. This morning, patient says she slept well and does not have any chest pain of SOB.   Denies fever, chills, abdominal pain, nausea, vomiting, changes in bowel habits, or urinary symptoms.    Vital Signs Last 24 Hrs  T(C): 36.6 (12 Nov 2023 21:53), Max: 36.6 (12 Nov 2023 18:22)  T(F): 97.9 (12 Nov 2023 21:53), Max: 97.9 (12 Nov 2023 21:53)  HR: 61 (12 Nov 2023 21:53) (58 - 77)  BP: 131/89 (12 Nov 2023 21:53) (131/89 - 163/94)  BP(mean): --  RR: 18 (12 Nov 2023 21:53) (18 - 18)  SpO2: 100% (12 Nov 2023 21:53) (98% - 100%)    Parameters below as of 12 Nov 2023 21:53  Patient On (Oxygen Delivery Method): room air      PHYSICAL EXAM:  GENERAL: NAD  HEENT: PERRL, no scleral icterus, no head and neck lad   CHEST/LUNG: mild exp wheezing anteriorly   HEART: RRR, normal S1, S2, no murmurs, gallops, or rubs appreciated   ABDOMEN: soft, nondistended, non-tender, normoactive, no HSM, no rebound, no guarding, no rigidity  SKIN: No rashes or lesions  NERVOUS SYSTEM: Alert & Oriented X3  EXT: no peripheral edema  PSYCH: calm and cooperative       HOSPITAL MEDICATIONS:  MEDICATIONS  (STANDING):  albuterol/ipratropium for Nebulization 3 milliLiter(s) Nebulizer every 6 hours  amLODIPine   Tablet 5 milliGRAM(s) Oral daily  ascorbic acid 500 milliGRAM(s) Oral daily  atorvastatin 80 milliGRAM(s) Oral at bedtime  azithromycin   Tablet 500 milliGRAM(s) Oral daily  budesonide  80 MICROgram(s)/formoterol 4.5 MICROgram(s) Inhaler 2 Puff(s) Inhalation two times a day  carvedilol 6.25 milliGRAM(s) Oral every 12 hours  cefTRIAXone   IVPB 1000 milliGRAM(s) IV Intermittent every 24 hours  cholecalciferol 1000 Unit(s) Oral daily  dextrose 5%. 1000 milliLiter(s) (50 mL/Hr) IV Continuous <Continuous>  dextrose 5%. 1000 milliLiter(s) (100 mL/Hr) IV Continuous <Continuous>  dextrose 50% Injectable 25 Gram(s) IV Push once  dextrose 50% Injectable 12.5 Gram(s) IV Push once  dextrose 50% Injectable 25 Gram(s) IV Push once  furosemide    Tablet 40 milliGRAM(s) Oral daily  glucagon  Injectable 1 milliGRAM(s) IntraMuscular once  insulin lispro (ADMELOG) corrective regimen sliding scale   SubCutaneous three times a day before meals  insulin lispro Injectable (ADMELOG) 4 Unit(s) SubCutaneous three times a day before meals  montelukast 10 milliGRAM(s) Oral daily  multivitamin 1 Tablet(s) Oral daily  predniSONE   Tablet 40 milliGRAM(s) Oral daily    MEDICATIONS  (PRN):  acetaminophen     Tablet .. 650 milliGRAM(s) Oral every 6 hours PRN Temp greater or equal to 38C (100.4F), Mild Pain (1 - 3)  albuterol    90 MICROgram(s) HFA Inhaler 2 Puff(s) Inhalation every 6 hours PRN Shortness of Breath and/or Wheezing  aluminum hydroxide/magnesium hydroxide/simethicone Suspension 30 milliLiter(s) Oral every 4 hours PRN Dyspepsia  dextrose Oral Gel 15 Gram(s) Oral once PRN Blood Glucose LESS THAN 70 milliGRAM(s)/deciliter  melatonin 3 milliGRAM(s) Oral at bedtime PRN Insomnia      LABS:                        11.8   9.46  )-----------( 196      ( 13 Nov 2023 05:36 )             36.6     11-13    143  |  104  |  30<H>  ----------------------------<  111<H>  3.8   |  27  |  0.98    Ca    9.2      13 Nov 2023 05:36      PTT - ( 12 Nov 2023 05:52 )  PTT:24.7 sec  Urinalysis Basic - ( 13 Nov 2023 05:36 )    Color: x / Appearance: x / SG: x / pH: x  Gluc: 111 mg/dL / Ketone: x  / Bili: x / Urobili: x   Blood: x / Protein: x / Nitrite: x   Leuk Esterase: x / RBC: x / WBC x   Sq Epi: x / Non Sq Epi: x / Bacteria: x

## 2023-11-13 NOTE — PROGRESS NOTE ADULT - PROBLEM SELECTOR PLAN 1
ddx ADHF and asthma exacerbation, NSTEMI  RVP neg  CXR with bilateral hilar and bibasilar fluffy opacities with small bilateral pleural effusions. Findings concerning for mild pulmonary edema.   VBG with improvement from 7.16/70/48 (on admission) to 7.28/49/23  - c/w CTX/azithro  - s/p lasix 40 mg IV   - started pred 40 mg daily for 5 days   - continue with home advair 2 puffs in the morning  - continue with home singulair 10 mg at night  - pulmonary consulted - recs appreciated  - cards consulted for trop elevation form 37 to 118 and EKG with new inverted T waves  - loaded with DAPT   - c/w DAPT  - started heparin gtt  - LHC on Mon  - monitoring trop, CKMB, CK, EKG q6h ddx ADHF and asthma exacerbation, NSTEMI  RVP neg  CXR with bilateral hilar and bibasilar fluffy opacities with small bilateral pleural effusions. Findings concerning for mild pulmonary edema.   VBG with improvement from 7.16/70/48 (on admission) to 7.28/49/23  - c/w CTX/azithro  - s/p lasix 40 mg IV   - started pred 40 mg daily for 5 days   - continue with home advair 2 puffs in the morning  - continue with home singulair 10 mg at night  - pulmonary consulted - recs appreciated  - cards consulted for trop elevation form 37 to 118 and EKG with new inverted T waves  - loaded with DAPT   - s/p plavix  - s/p heparin gtt  - C today - recs regarding Brillinta and Plavix after procedure

## 2023-11-13 NOTE — DISCHARGE NOTE PROVIDER - NSDCFUADDAPPT_GEN_ALL_CORE_FT
APPTS ARE READY TO BE MADE: [ ] YES    Best Family or Patient Contact (if needed):    Additional Information about above appointments (if needed):    1: PCP within a week at [phone] within 1 week after discharge   2: Dr. Munson, cardiologist, within 1 week after discharge   3:     Other comments or requests:
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
## 2023-11-13 NOTE — DISCHARGE NOTE PROVIDER - NSFOLLOWUPCLINICS_GEN_ALL_ED_FT
[facility] - Primary Care  Primary Care  [address] [place] [place] [address]  Phone: [phone]  Fax:

## 2023-11-14 ENCOUNTER — TRANSCRIPTION ENCOUNTER (OUTPATIENT)
Age: 68
End: 2023-11-14

## 2023-11-14 VITALS
OXYGEN SATURATION: 98 % | HEART RATE: 68 BPM | TEMPERATURE: 98 F | DIASTOLIC BLOOD PRESSURE: 79 MMHG | RESPIRATION RATE: 18 BRPM | SYSTOLIC BLOOD PRESSURE: 135 MMHG

## 2023-11-14 LAB
ANION GAP SERPL CALC-SCNC: 16 MMOL/L — HIGH (ref 7–14)
ANION GAP SERPL CALC-SCNC: 16 MMOL/L — HIGH (ref 7–14)
BUN SERPL-MCNC: 26 MG/DL — HIGH (ref 7–23)
BUN SERPL-MCNC: 26 MG/DL — HIGH (ref 7–23)
CALCIUM SERPL-MCNC: 9.8 MG/DL — SIGNIFICANT CHANGE UP (ref 8.4–10.5)
CALCIUM SERPL-MCNC: 9.8 MG/DL — SIGNIFICANT CHANGE UP (ref 8.4–10.5)
CHLORIDE SERPL-SCNC: 101 MMOL/L — SIGNIFICANT CHANGE UP (ref 98–107)
CHLORIDE SERPL-SCNC: 101 MMOL/L — SIGNIFICANT CHANGE UP (ref 98–107)
CO2 SERPL-SCNC: 28 MMOL/L — SIGNIFICANT CHANGE UP (ref 22–31)
CO2 SERPL-SCNC: 28 MMOL/L — SIGNIFICANT CHANGE UP (ref 22–31)
CREAT SERPL-MCNC: 0.93 MG/DL — SIGNIFICANT CHANGE UP (ref 0.5–1.3)
CREAT SERPL-MCNC: 0.93 MG/DL — SIGNIFICANT CHANGE UP (ref 0.5–1.3)
EGFR: 68 ML/MIN/1.73M2 — SIGNIFICANT CHANGE UP
EGFR: 68 ML/MIN/1.73M2 — SIGNIFICANT CHANGE UP
GLUCOSE BLDC GLUCOMTR-MCNC: 182 MG/DL — HIGH (ref 70–99)
GLUCOSE BLDC GLUCOMTR-MCNC: 182 MG/DL — HIGH (ref 70–99)
GLUCOSE BLDC GLUCOMTR-MCNC: 219 MG/DL — HIGH (ref 70–99)
GLUCOSE BLDC GLUCOMTR-MCNC: 219 MG/DL — HIGH (ref 70–99)
GLUCOSE BLDC GLUCOMTR-MCNC: 267 MG/DL — HIGH (ref 70–99)
GLUCOSE BLDC GLUCOMTR-MCNC: 267 MG/DL — HIGH (ref 70–99)
GLUCOSE BLDC GLUCOMTR-MCNC: 296 MG/DL — HIGH (ref 70–99)
GLUCOSE BLDC GLUCOMTR-MCNC: 296 MG/DL — HIGH (ref 70–99)
GLUCOSE SERPL-MCNC: 130 MG/DL — HIGH (ref 70–99)
GLUCOSE SERPL-MCNC: 130 MG/DL — HIGH (ref 70–99)
HCT VFR BLD CALC: 41.9 % — SIGNIFICANT CHANGE UP (ref 34.5–45)
HCT VFR BLD CALC: 41.9 % — SIGNIFICANT CHANGE UP (ref 34.5–45)
HGB BLD-MCNC: 13.4 G/DL — SIGNIFICANT CHANGE UP (ref 11.5–15.5)
HGB BLD-MCNC: 13.4 G/DL — SIGNIFICANT CHANGE UP (ref 11.5–15.5)
MAGNESIUM SERPL-MCNC: 1.8 MG/DL — SIGNIFICANT CHANGE UP (ref 1.6–2.6)
MAGNESIUM SERPL-MCNC: 1.8 MG/DL — SIGNIFICANT CHANGE UP (ref 1.6–2.6)
MCHC RBC-ENTMCNC: 28.4 PG — SIGNIFICANT CHANGE UP (ref 27–34)
MCHC RBC-ENTMCNC: 28.4 PG — SIGNIFICANT CHANGE UP (ref 27–34)
MCHC RBC-ENTMCNC: 32 GM/DL — SIGNIFICANT CHANGE UP (ref 32–36)
MCHC RBC-ENTMCNC: 32 GM/DL — SIGNIFICANT CHANGE UP (ref 32–36)
MCV RBC AUTO: 88.8 FL — SIGNIFICANT CHANGE UP (ref 80–100)
MCV RBC AUTO: 88.8 FL — SIGNIFICANT CHANGE UP (ref 80–100)
NRBC # BLD: 0 /100 WBCS — SIGNIFICANT CHANGE UP (ref 0–0)
NRBC # BLD: 0 /100 WBCS — SIGNIFICANT CHANGE UP (ref 0–0)
NRBC # FLD: 0 K/UL — SIGNIFICANT CHANGE UP (ref 0–0)
NRBC # FLD: 0 K/UL — SIGNIFICANT CHANGE UP (ref 0–0)
PHOSPHATE SERPL-MCNC: 4.2 MG/DL — SIGNIFICANT CHANGE UP (ref 2.5–4.5)
PHOSPHATE SERPL-MCNC: 4.2 MG/DL — SIGNIFICANT CHANGE UP (ref 2.5–4.5)
PLATELET # BLD AUTO: 232 K/UL — SIGNIFICANT CHANGE UP (ref 150–400)
PLATELET # BLD AUTO: 232 K/UL — SIGNIFICANT CHANGE UP (ref 150–400)
POTASSIUM SERPL-MCNC: 3.4 MMOL/L — LOW (ref 3.5–5.3)
POTASSIUM SERPL-MCNC: 3.4 MMOL/L — LOW (ref 3.5–5.3)
POTASSIUM SERPL-SCNC: 3.4 MMOL/L — LOW (ref 3.5–5.3)
POTASSIUM SERPL-SCNC: 3.4 MMOL/L — LOW (ref 3.5–5.3)
RBC # BLD: 4.72 M/UL — SIGNIFICANT CHANGE UP (ref 3.8–5.2)
RBC # BLD: 4.72 M/UL — SIGNIFICANT CHANGE UP (ref 3.8–5.2)
RBC # FLD: 14 % — SIGNIFICANT CHANGE UP (ref 10.3–14.5)
RBC # FLD: 14 % — SIGNIFICANT CHANGE UP (ref 10.3–14.5)
SODIUM SERPL-SCNC: 145 MMOL/L — SIGNIFICANT CHANGE UP (ref 135–145)
SODIUM SERPL-SCNC: 145 MMOL/L — SIGNIFICANT CHANGE UP (ref 135–145)
WBC # BLD: 10.59 K/UL — HIGH (ref 3.8–10.5)
WBC # BLD: 10.59 K/UL — HIGH (ref 3.8–10.5)
WBC # FLD AUTO: 10.59 K/UL — HIGH (ref 3.8–10.5)
WBC # FLD AUTO: 10.59 K/UL — HIGH (ref 3.8–10.5)

## 2023-11-14 PROCEDURE — 99233 SBSQ HOSP IP/OBS HIGH 50: CPT

## 2023-11-14 PROCEDURE — 99239 HOSP IP/OBS DSCHRG MGMT >30: CPT | Mod: GC

## 2023-11-14 RX ORDER — GLYBURIDE-METFORMIN HYDROCHLORIDE 1.25; 25 MG/1; MG/1
1 TABLET ORAL
Refills: 0 | DISCHARGE

## 2023-11-14 RX ORDER — CLOPIDOGREL BISULFATE 75 MG/1
1 TABLET, FILM COATED ORAL
Qty: 30 | Refills: 1
Start: 2023-11-14 | End: 2024-01-12

## 2023-11-14 RX ORDER — BUDESONIDE AND FORMOTEROL FUMARATE DIHYDRATE 160; 4.5 UG/1; UG/1
2 AEROSOL RESPIRATORY (INHALATION)
Qty: 1 | Refills: 1
Start: 2023-11-14 | End: 2024-01-12

## 2023-11-14 RX ORDER — FUROSEMIDE 40 MG
1 TABLET ORAL
Qty: 30 | Refills: 1
Start: 2023-11-14 | End: 2024-01-12

## 2023-11-14 RX ORDER — SACUBITRIL AND VALSARTAN 24; 26 MG/1; MG/1
1 TABLET, FILM COATED ORAL
Qty: 60 | Refills: 1
Start: 2023-11-14 | End: 2024-01-12

## 2023-11-14 RX ORDER — POTASSIUM CHLORIDE 20 MEQ
20 PACKET (EA) ORAL ONCE
Refills: 0 | Status: COMPLETED | OUTPATIENT
Start: 2023-11-14 | End: 2023-11-14

## 2023-11-14 RX ADMIN — Medication 3 MILLILITER(S): at 10:40

## 2023-11-14 RX ADMIN — Medication 3 MILLILITER(S): at 03:36

## 2023-11-14 RX ADMIN — Medication 6: at 12:50

## 2023-11-14 RX ADMIN — CLOPIDOGREL BISULFATE 600 MILLIGRAM(S): 75 TABLET, FILM COATED ORAL at 11:49

## 2023-11-14 RX ADMIN — AMLODIPINE BESYLATE 5 MILLIGRAM(S): 2.5 TABLET ORAL at 06:10

## 2023-11-14 RX ADMIN — AZITHROMYCIN 500 MILLIGRAM(S): 500 TABLET, FILM COATED ORAL at 11:41

## 2023-11-14 RX ADMIN — Medication 4 UNIT(S): at 12:50

## 2023-11-14 RX ADMIN — Medication 40 MILLIGRAM(S): at 06:10

## 2023-11-14 RX ADMIN — TICAGRELOR 90 MILLIGRAM(S): 90 TABLET ORAL at 06:10

## 2023-11-14 RX ADMIN — CARVEDILOL PHOSPHATE 6.25 MILLIGRAM(S): 80 CAPSULE, EXTENDED RELEASE ORAL at 06:10

## 2023-11-14 RX ADMIN — Medication 4: at 08:50

## 2023-11-14 RX ADMIN — CEFTRIAXONE 100 MILLIGRAM(S): 500 INJECTION, POWDER, FOR SOLUTION INTRAMUSCULAR; INTRAVENOUS at 12:51

## 2023-11-14 RX ADMIN — Medication 20 MILLIEQUIVALENT(S): at 11:42

## 2023-11-14 RX ADMIN — Medication 1000 UNIT(S): at 11:41

## 2023-11-14 RX ADMIN — Medication 1 TABLET(S): at 11:40

## 2023-11-14 RX ADMIN — Medication 81 MILLIGRAM(S): at 11:49

## 2023-11-14 RX ADMIN — BUDESONIDE AND FORMOTEROL FUMARATE DIHYDRATE 2 PUFF(S): 160; 4.5 AEROSOL RESPIRATORY (INHALATION) at 08:54

## 2023-11-14 RX ADMIN — Medication 500 MILLIGRAM(S): at 11:40

## 2023-11-14 RX ADMIN — MONTELUKAST 10 MILLIGRAM(S): 4 TABLET, CHEWABLE ORAL at 11:41

## 2023-11-14 RX ADMIN — Medication 4 UNIT(S): at 08:50

## 2023-11-14 NOTE — PROGRESS NOTE ADULT - SUBJECTIVE AND OBJECTIVE BOX
24H hour events:   pt resting  no cp sob   no acute events overnight  MEDICATIONS:  amLODIPine   Tablet 5 milliGRAM(s) Oral daily  aspirin enteric coated 81 milliGRAM(s) Oral daily  carvedilol 6.25 milliGRAM(s) Oral every 12 hours  clopidogrel Tablet 600 milliGRAM(s) Oral once  furosemide    Tablet 40 milliGRAM(s) Oral daily    azithromycin   Tablet 500 milliGRAM(s) Oral daily  cefTRIAXone   IVPB 1000 milliGRAM(s) IV Intermittent every 24 hours    albuterol    90 MICROgram(s) HFA Inhaler 2 Puff(s) Inhalation every 6 hours PRN  albuterol/ipratropium for Nebulization 3 milliLiter(s) Nebulizer every 6 hours  budesonide  80 MICROgram(s)/formoterol 4.5 MICROgram(s) Inhaler 2 Puff(s) Inhalation two times a day  montelukast 10 milliGRAM(s) Oral daily    acetaminophen     Tablet .. 650 milliGRAM(s) Oral every 6 hours PRN  melatonin 3 milliGRAM(s) Oral at bedtime PRN    aluminum hydroxide/magnesium hydroxide/simethicone Suspension 30 milliLiter(s) Oral every 4 hours PRN    atorvastatin 80 milliGRAM(s) Oral at bedtime  dextrose 50% Injectable 25 Gram(s) IV Push once  dextrose 50% Injectable 12.5 Gram(s) IV Push once  dextrose 50% Injectable 25 Gram(s) IV Push once  dextrose Oral Gel 15 Gram(s) Oral once PRN  glucagon  Injectable 1 milliGRAM(s) IntraMuscular once  insulin lispro (ADMELOG) corrective regimen sliding scale   SubCutaneous three times a day before meals  insulin lispro Injectable (ADMELOG) 4 Unit(s) SubCutaneous three times a day before meals  predniSONE   Tablet 40 milliGRAM(s) Oral daily    ascorbic acid 500 milliGRAM(s) Oral daily  cholecalciferol 1000 Unit(s) Oral daily  dextrose 5%. 1000 milliLiter(s) IV Continuous <Continuous>  dextrose 5%. 1000 milliLiter(s) IV Continuous <Continuous>  multivitamin 1 Tablet(s) Oral daily  potassium chloride    Tablet ER 20 milliEquivalent(s) Oral once  sodium chloride 0.9%. 1000 milliLiter(s) IV Continuous <Continuous>          PHYSICAL EXAM:  T(C): 36.7 (11-14-23 @ 06:00), Max: 37.1 (11-13-23 @ 22:01)  HR: 64 (11-14-23 @ 06:00) (64 - 78)  BP: 156/97 (11-14-23 @ 06:00) (138/72 - 156/97)  RR: 18 (11-14-23 @ 06:00) (18 - 18)  SpO2: 99% (11-14-23 @ 06:00) (99% - 100%)  Wt(kg): --  I&O's Summary    13 Nov 2023 07:01  -  14 Nov 2023 07:00  --------------------------------------------------------  IN: 400 mL / OUT: 0 mL / NET: 400 mL        Appearance: Normal	  HEENT:   Normal oral mucosa, PERRL, EOMI	  Lymphatic: No lymphadenopathy  Cardiovascular: Normal S1 S2, No JVD, No murmurs, No edema  Respiratory: Lungs clear to auscultation	  Psychiatry: A & O x 3, Mood & affect appropriate  Gastrointestinal:  Soft, Non-tender, + BS	  Skin: No rashes, No ecchymoses, No cyanosis	  Neurologic: Non-focal  Extremities: RRA site clean, dry, adequate pulses      LABS:	 	    CBC Full  -  ( 14 Nov 2023 05:30 )  WBC Count : 10.59 K/uL  Hemoglobin : 13.4 g/dL  Hematocrit : 41.9 %  Platelet Count - Automated : 232 K/uL  Mean Cell Volume : 88.8 fL  Mean Cell Hemoglobin : 28.4 pg  Mean Cell Hemoglobin Concentration : 32.0 gm/dL  Auto Neutrophil # : x  Auto Lymphocyte # : x  Auto Monocyte # : x  Auto Eosinophil # : x  Auto Basophil # : x  Auto Neutrophil % : x  Auto Lymphocyte % : x  Auto Monocyte % : x  Auto Eosinophil % : x  Auto Basophil % : x    11-14    145  |  101  |  26<H>  ----------------------------<  130<H>  3.4<L>   |  28  |  0.93  11-13    143  |  104  |  30<H>  ----------------------------<  111<H>  3.8   |  27  |  0.98    Ca    9.8      14 Nov 2023 05:30  Ca    9.2      13 Nov 2023 05:36  Phos  4.2     11-14  Mg     1.80     11-14        proBNP:   Lipid Profile:   HgA1c:   TSH:       CARDIAC MARKERS:            TELEMETRY: 	    ECG:  	  RADIOLOGY:  OTHER: 	    PREVIOUS DIAGNOSTIC TESTING:    [ ] Echocardiogram:  [ ]  Catheterization:  [ ] Stress Test:  	  	  ASSESSMENT/PLAN:

## 2023-11-14 NOTE — PROGRESS NOTE ADULT - PROBLEM SELECTOR PLAN 4
- continue with home advair 2 puffs in the morning

## 2023-11-14 NOTE — PROGRESS NOTE ADULT - ASSESSMENT
68F PMH diabetes, HFmrEF, CAD (s/p 2 stents ~4 years ago), asthma admitted with acute onset SOB and orthopnea not responsive to home inhalers. On admission hypercarbic on VBG, improving with BiPAP concerning for ?mixed picture of asthma exacerbation and CHF exacerbation. Cardiology consulted for dynamic EKG changes and troponin elevation 37->118, has plateaued. Denying chest pain/palpitations. She does report recent abnormal stress test with plan for LHC by her outpatient cardiolgist who's name she cannot recall. On further evaluation primary team reports that actually her primary cardiologist had planned to order a stress test. TTE showed Ef 45%, hypokinesis of inferolateral wall, mild to mod MR. Now s/p LHC with MAURO to ramus.    Recommendations:  - s/p ticagrelor load, will need to transition to clopidogrel (Plavix) for insurance reasons. Tonight give Ticagrelor 90 mg x1  -- give Plavix 600 mg load on 11/14/23 and then start Plavix 75 mg daily starting 11/15/23.  - Continue Aspirin 81mg, high intensity statin  - Continue low dose diuretics, carvedilol, amlodipine  - price-check for Entresto, if covered by insurance start tomorrow, if not covered can restart home lisinopril 40mg    Danielle Ignacio MD  PGY-4, Cardiology  Available on TEAMS    For all new consults  [email or web address]  Login: [email or web address]  
68F w HFrEF, CAD/PCI DM admitted with acute onset SOB     TTE showed EF 45%, hypokinesis of inferolateral wall, mild to mod MR.   Now s/p LHC with MAURO to ramus.  no cp sob  comfortable appearing  cont dapt   cont med mgmt of CAD with bb statin  will need outpt cards follow up. 
68F with diabetes, CHF, CAD (s/p 2 stents ~4 years ago), asthma, presents with acute onset shortness of breath that started once she woke up this morning and was not precipitated by any events. She tried a nebulizer she had at home without any improvement. Associated orthopnea? CXR with b/l pleural effusions, hilar adenopathy s/f ADHF vs asthma exacerbation. 
68F PMH diabetes, HFmrEF, CAD (s/p 2 stents ~4 years ago), asthma admitted with acute onset SOB and orthopnea not responsive to home inhalers. On admission hypercarbic on VBG, improving with BiPAP concerning for ?mixed picture of asthma exacerbation and CHF exacerbation. Cardiology consulted for dynamic EKG changes and troponin elevation 37->118, has plateaued. Denying chest pain/palpitations. She does report recent abnormal stress test with plan for LHC by her outpatient cardiolgist who's name she cannot recall.     Recommendations:   - Can stop Heparin gtt and Plavix. Troponin has plateaued and likely elevated in setting of volume overload.   - Continue Aspirin 81mg, high intensity statin  - Obtain outpatient stress test information. Possible LHC on Monday  - F/u TTE  - Continue low dose diuretics       Tylor Sosa MD  Cardiology Fellow - PGY 6  For all New Consults and Questions:  [email or web address]   Login: [employer]
68F with diabetes, CHF, CAD (s/p 2 stents ~4 years ago), asthma, presents with acute onset shortness of breath that started once she woke up this morning and was not precipitated by any events. She tried a nebulizer she had at home without any improvement. Associated orthopnea? CXR with b/l pleural effusions, hilar adenopathy s/f ADHF vs asthma exacerbation. 

## 2023-11-14 NOTE — CHART NOTE - NSCHARTNOTEFT_GEN_A_CORE
Spoke to patient's daughter in law Mari who assists patient with administering medications at home - clarified that patient did not receive Entresto due to lack of insurance coverage and to discontinue hydrochlorothiazide given patient will be starting on Lasix. Daughter in law had no objections and understood d/c medication regimen without Entresto and hydrochlorothiazide.

## 2023-11-14 NOTE — PROGRESS NOTE ADULT - PROBLEM SELECTOR PLAN 3
- continue with aspirin 81 daily

## 2023-11-14 NOTE — PROGRESS NOTE ADULT - PROBLEM SELECTOR PLAN 2
Echo 2019 with EF 40% with inferior and basal to mid inferolateral wall hypokinesis.  TTE with EF 40-45%  - continue carvedilol 6.25 mg BID PO  - c/w home lisinopril 40 daily  - c/w home atorvastatin 80 mg daily

## 2023-11-14 NOTE — PROGRESS NOTE ADULT - PROBLEM SELECTOR PLAN 1
ddx ADHF and asthma exacerbation, NSTEMI  RVP neg  CXR with bilateral hilar and bibasilar fluffy opacities with small bilateral pleural effusions. Findings concerning for mild pulmonary edema.   VBG with improvement from 7.16/70/48 (on admission) to 7.28/49/23  - c/w CTX/azithro  - s/p lasix 40 mg IV   - started pred 40 mg daily for 5 days   - continue with home advair 2 puffs in the morning  - continue with home singulair 10 mg at night  - pulmonary consulted - recs appreciated  - cards consulted for trop elevation form 37 to 118 and EKG with new inverted T waves  - loaded with DAPT   - s/p plavix  - s/p heparin gtt  - C today - recs regarding Brillinta and Plavix after procedure ddx ADHF and asthma exacerbation, NSTEMI  RVP neg  CXR with bilateral hilar and bibasilar fluffy opacities with small bilateral pleural effusions. Findings concerning for mild pulmonary edema.   VBG with improvement from 7.16/70/48 (on admission) to 7.28/49/23  s/p LHC with stenting of ramus 11/14  - c/w CTX/azithro  - s/p lasix 40 mg IV   - started pred 40 mg daily for 5 days   - continue with home advair 2 puffs in the morning  - continue with home singulair 10 mg at night  - pulmonary consulted - recs appreciated  - cards consulted for trop elevation form 37 to 118 and EKG with new inverted T waves  - loaded with DAPT   - c/w plavix  - s/p plavix

## 2023-11-14 NOTE — PROVIDER CONTACT NOTE (OTHER) - ASSESSMENT
Aymptomatic; no acute distress; no pain or discomfort noted; care ongoing
No acute distress, asymptomatic, no chest pain/discomfort

## 2023-11-14 NOTE — DISCHARGE NOTE NURSING/CASE MANAGEMENT/SOCIAL WORK - PATIENT PORTAL LINK FT
You can access the FollowMyHealth Patient Portal offered by [facility] by registering at the following website: [email or web address]. By joining [facility]’s FollowMyHealth portal, you will also be able to view your health information using other applications (apps) compatible with our system.

## 2023-11-14 NOTE — DISCHARGE NOTE NURSING/CASE MANAGEMENT/SOCIAL WORK - NSDCPEFALRISK_GEN_ALL_CORE
For information on Fall & Injury Prevention, visit: [email or web address] OR  [email or web address] OR  https://www.cdc.gov/steadi/patient.html

## 2023-11-14 NOTE — PROGRESS NOTE ADULT - PROBLEM SELECTOR PLAN 7
Diet: DASH  DVT proph: lovenox   Dispo: pending medical clearance

## 2023-11-14 NOTE — PROGRESS NOTE ADULT - SUBJECTIVE AND OBJECTIVE BOX
Delia Velasco MD    Internal Medicine Resident (PGY-1)  ___________________________________________________________________________________________________      SATINDER KELLY 68y Female    Overnight events/subjective: No acute overnight events. Patient seen and examined at bedside. No complaints. Denies fever, chills, chest pain, shortness of breath, abdominal pain, nausea, vomiting, changes in bowel habits, or urinary symptoms.    Vital Signs Last 24 Hrs  T(C): 36.7 (14 Nov 2023 06:00), Max: 37.1 (13 Nov 2023 22:01)  T(F): 98.1 (14 Nov 2023 06:00), Max: 98.8 (13 Nov 2023 22:01)  HR: 64 (14 Nov 2023 06:00) (64 - 78)  BP: 156/97 (14 Nov 2023 06:00) (138/72 - 156/97)  BP(mean): --  RR: 18 (14 Nov 2023 06:00) (18 - 18)  SpO2: 99% (14 Nov 2023 06:00) (99% - 100%)    Parameters below as of 14 Nov 2023 06:00  Patient On (Oxygen Delivery Method): room air      PHYSICAL EXAM:  GENERAL: NAD  HEENT: PERRL, no scleral icterus, no head and neck lad   CHEST/LUNG: mild exp wheezing anteriorly   HEART: RRR, normal S1, S2, no murmurs, gallops, or rubs appreciated   ABDOMEN: soft, nondistended, non-tender, normoactive, no HSM, no rebound, no guarding, no rigidity  SKIN: No rashes or lesions  NERVOUS SYSTEM: Alert & Oriented X3  EXT: no peripheral edema  PSYCH: calm and cooperative       HOSPITAL MEDICATIONS:  MEDICATIONS  (STANDING):  albuterol/ipratropium for Nebulization 3 milliLiter(s) Nebulizer every 6 hours  amLODIPine   Tablet 5 milliGRAM(s) Oral daily  ascorbic acid 500 milliGRAM(s) Oral daily  aspirin enteric coated 81 milliGRAM(s) Oral daily  atorvastatin 80 milliGRAM(s) Oral at bedtime  azithromycin   Tablet 500 milliGRAM(s) Oral daily  budesonide  80 MICROgram(s)/formoterol 4.5 MICROgram(s) Inhaler 2 Puff(s) Inhalation two times a day  carvedilol 6.25 milliGRAM(s) Oral every 12 hours  cefTRIAXone   IVPB 1000 milliGRAM(s) IV Intermittent every 24 hours  cholecalciferol 1000 Unit(s) Oral daily  clopidogrel Tablet 600 milliGRAM(s) Oral once  dextrose 5%. 1000 milliLiter(s) (100 mL/Hr) IV Continuous <Continuous>  dextrose 5%. 1000 milliLiter(s) (50 mL/Hr) IV Continuous <Continuous>  dextrose 50% Injectable 25 Gram(s) IV Push once  dextrose 50% Injectable 12.5 Gram(s) IV Push once  dextrose 50% Injectable 25 Gram(s) IV Push once  furosemide    Tablet 40 milliGRAM(s) Oral daily  glucagon  Injectable 1 milliGRAM(s) IntraMuscular once  insulin lispro (ADMELOG) corrective regimen sliding scale   SubCutaneous three times a day before meals  insulin lispro Injectable (ADMELOG) 4 Unit(s) SubCutaneous three times a day before meals  montelukast 10 milliGRAM(s) Oral daily  multivitamin 1 Tablet(s) Oral daily  predniSONE   Tablet 40 milliGRAM(s) Oral daily  sodium chloride 0.9%. 1000 milliLiter(s) (75 mL/Hr) IV Continuous <Continuous>    MEDICATIONS  (PRN):  acetaminophen     Tablet .. 650 milliGRAM(s) Oral every 6 hours PRN Temp greater or equal to 38C (100.4F), Mild Pain (1 - 3)  albuterol    90 MICROgram(s) HFA Inhaler 2 Puff(s) Inhalation every 6 hours PRN Shortness of Breath and/or Wheezing  aluminum hydroxide/magnesium hydroxide/simethicone Suspension 30 milliLiter(s) Oral every 4 hours PRN Dyspepsia  dextrose Oral Gel 15 Gram(s) Oral once PRN Blood Glucose LESS THAN 70 milliGRAM(s)/deciliter  melatonin 3 milliGRAM(s) Oral at bedtime PRN Insomnia      LABS:                        11.8   9.46  )-----------( 196      ( 13 Nov 2023 05:36 )             36.6     11-13    143  |  104  |  30<H>  ----------------------------<  111<H>  3.8   |  27  |  0.98    Ca    9.2      13 Nov 2023 05:36        Urinalysis Basic - ( 13 Nov 2023 05:36 )    Color: x / Appearance: x / SG: x / pH: x  Gluc: 111 mg/dL / Ketone: x  / Bili: x / Urobili: x   Blood: x / Protein: x / Nitrite: x   Leuk Esterase: x / RBC: x / WBC x   Sq Epi: x / Non Sq Epi: x / Bacteria: x         Delia Velasco MD    Internal Medicine Resident (PGY-1)  ___________________________________________________________________________________________________      SATINDER KELLY 68y Female    Overnight events/subjective: No acute overnight events. Patient seen and examined at bedside. This morning, patient is seen walking around comfortably. She does not have any complaints.   Denies fever, chills, chest pain, shortness of breath, abdominal pain, nausea, vomiting, changes in bowel habits, or urinary symptoms.    Vital Signs Last 24 Hrs  T(C): 36.7 (14 Nov 2023 06:00), Max: 37.1 (13 Nov 2023 22:01)  T(F): 98.1 (14 Nov 2023 06:00), Max: 98.8 (13 Nov 2023 22:01)  HR: 64 (14 Nov 2023 06:00) (64 - 78)  BP: 156/97 (14 Nov 2023 06:00) (138/72 - 156/97)  BP(mean): --  RR: 18 (14 Nov 2023 06:00) (18 - 18)  SpO2: 99% (14 Nov 2023 06:00) (99% - 100%)    Parameters below as of 14 Nov 2023 06:00  Patient On (Oxygen Delivery Method): room air      PHYSICAL EXAM:  GENERAL: NAD  HEENT: PERRL, no scleral icterus, no head and neck lad   CHEST/LUNG: mild exp wheezing anteriorly   HEART: RRR, normal S1, S2, no murmurs, gallops, or rubs appreciated   ABDOMEN: soft, nondistended, non-tender, normoactive, no HSM, no rebound, no guarding, no rigidity  SKIN: No rashes or lesions  NERVOUS SYSTEM: Alert & Oriented X3  EXT: no peripheral edema  PSYCH: calm and cooperative       HOSPITAL MEDICATIONS:  MEDICATIONS  (STANDING):  albuterol/ipratropium for Nebulization 3 milliLiter(s) Nebulizer every 6 hours  amLODIPine   Tablet 5 milliGRAM(s) Oral daily  ascorbic acid 500 milliGRAM(s) Oral daily  aspirin enteric coated 81 milliGRAM(s) Oral daily  atorvastatin 80 milliGRAM(s) Oral at bedtime  azithromycin   Tablet 500 milliGRAM(s) Oral daily  budesonide  80 MICROgram(s)/formoterol 4.5 MICROgram(s) Inhaler 2 Puff(s) Inhalation two times a day  carvedilol 6.25 milliGRAM(s) Oral every 12 hours  cefTRIAXone   IVPB 1000 milliGRAM(s) IV Intermittent every 24 hours  cholecalciferol 1000 Unit(s) Oral daily  clopidogrel Tablet 600 milliGRAM(s) Oral once  dextrose 5%. 1000 milliLiter(s) (100 mL/Hr) IV Continuous <Continuous>  dextrose 5%. 1000 milliLiter(s) (50 mL/Hr) IV Continuous <Continuous>  dextrose 50% Injectable 25 Gram(s) IV Push once  dextrose 50% Injectable 12.5 Gram(s) IV Push once  dextrose 50% Injectable 25 Gram(s) IV Push once  furosemide    Tablet 40 milliGRAM(s) Oral daily  glucagon  Injectable 1 milliGRAM(s) IntraMuscular once  insulin lispro (ADMELOG) corrective regimen sliding scale   SubCutaneous three times a day before meals  insulin lispro Injectable (ADMELOG) 4 Unit(s) SubCutaneous three times a day before meals  montelukast 10 milliGRAM(s) Oral daily  multivitamin 1 Tablet(s) Oral daily  predniSONE   Tablet 40 milliGRAM(s) Oral daily  sodium chloride 0.9%. 1000 milliLiter(s) (75 mL/Hr) IV Continuous <Continuous>    MEDICATIONS  (PRN):  acetaminophen     Tablet .. 650 milliGRAM(s) Oral every 6 hours PRN Temp greater or equal to 38C (100.4F), Mild Pain (1 - 3)  albuterol    90 MICROgram(s) HFA Inhaler 2 Puff(s) Inhalation every 6 hours PRN Shortness of Breath and/or Wheezing  aluminum hydroxide/magnesium hydroxide/simethicone Suspension 30 milliLiter(s) Oral every 4 hours PRN Dyspepsia  dextrose Oral Gel 15 Gram(s) Oral once PRN Blood Glucose LESS THAN 70 milliGRAM(s)/deciliter  melatonin 3 milliGRAM(s) Oral at bedtime PRN Insomnia      LABS:                        11.8   9.46  )-----------( 196      ( 13 Nov 2023 05:36 )             36.6     11-13    143  |  104  |  30<H>  ----------------------------<  111<H>  3.8   |  27  |  0.98    Ca    9.2      13 Nov 2023 05:36        Urinalysis Basic - ( 13 Nov 2023 05:36 )    Color: x / Appearance: x / SG: x / pH: x  Gluc: 111 mg/dL / Ketone: x  / Bili: x / Urobili: x   Blood: x / Protein: x / Nitrite: x   Leuk Esterase: x / RBC: x / WBC x   Sq Epi: x / Non Sq Epi: x / Bacteria: x

## 2023-11-14 NOTE — PROGRESS NOTE ADULT - ATTENDING COMMENTS
68F with T2DM on oral agents, HFrEF, CAD c/b NSTEMI (s/p 2 stents ~4 years ago), asthma, who presents with dyspnea and was found to have acute hypoxemic respiratory failure likely 2/2 to CAP and asthma exacerbation, with suspected element of HF exacerbation as well.       #CAD:  -troponin elevated from 37 ---> 118 with mildly elevated CK. No active chest pain  -EKG changes; now with TWI in V4-V6  -pt has hx of STEMI in 2019 s/p 2 stents  -s/p pci to ramus, start asa, plavix per cards   -outpt follow up with cards      #CHF exacerbation:   -EF 45%  -lasix 40 daily  -outpt follow up with cards   -GDMT with coreg and entresto(if covered by insurance)      #Acute hypoxemic and hypercapnic respiratory failure:   -Resolved  -found to be hypoxic to 70s by EMS  -placed on BIPAP in ED, now improved and was able to be transitioned to 3 L NC  -PCO2 improved on BIPAP  -in setting of PNA, asthma exacerbation as above  -CAP coverage with CTX/Azithromcyin for suspected PNA  -EF 45-50% - lasix 40 daily    #Asthma exacerbation:   -likely precipitated by PNA  -RVP negative  -pulm following: Prednisone 40 mg daily x 5 days    #T2DM:   -moderate dose ISS given expected steroid induced hyperglycemia      DC home today. Patient has appointment with PCP on 11/16. Time spent 50 mins

## 2023-11-14 NOTE — DISCHARGE NOTE NURSING/CASE MANAGEMENT/SOCIAL WORK - NSDCFUADDAPPT_GEN_ALL_CORE_FT
APPTS ARE READY TO BE MADE: [ ] YES    Best Family or Patient Contact (if needed):    Additional Information about above appointments (if needed):    1: PCP within a week at [phone] within 1 week after discharge   2: Dr. Munson, cardiologist, within 1 week after discharge   3:     Other comments or requests:

## 2023-11-15 LAB
CULTURE RESULTS: SIGNIFICANT CHANGE UP
CULTURE RESULTS: SIGNIFICANT CHANGE UP
SPECIMEN SOURCE: SIGNIFICANT CHANGE UP
SPECIMEN SOURCE: SIGNIFICANT CHANGE UP

## 2023-11-16 ENCOUNTER — APPOINTMENT (OUTPATIENT)
Dept: PULMONOLOGY | Facility: CLINIC | Age: 68
End: 2023-11-16
Payer: COMMERCIAL

## 2023-11-16 DIAGNOSIS — I25.10 ATHEROSCLEROTIC HEART DISEASE OF NATIVE CORONARY ARTERY W/OUT ANGINA PECTORIS: ICD-10-CM

## 2023-11-16 DIAGNOSIS — J45.909 UNSPECIFIED ASTHMA, UNCOMPLICATED: ICD-10-CM

## 2023-11-16 DIAGNOSIS — Z95.5 PRESENCE OF CORONARY ANGIOPLASTY IMPLANT AND GRAFT: ICD-10-CM

## 2023-11-16 PROCEDURE — 99496 TRANSJ CARE MGMT HIGH F2F 7D: CPT | Mod: 95

## 2023-11-20 NOTE — CHART NOTE - NSCHARTNOTESELECT_GEN_ALL_CORE
Chart Note/Nutrition Services
Event Note
Event Note
Medications on DC/Event Note
Pharmacist/Post-Discharge Note
Pulm/Event Note

## 2023-11-20 NOTE — CHART NOTE - NSCHARTNOTEFT_GEN_A_CORE
Post-Discharge Medication Review    Three attempts were made to contact patient and caregiver to offer medication counseling post-discharge. Patient could not be reached, and medication counseling could not be completed.

## 2024-01-20 NOTE — ASU PATIENT PROFILE, ADULT - NS PRO ABUSE SCREEN SUSPICION NEGLECT YN
Likely MSK related pain  Patient has a L1 fracture and chronic pain  She follows with a pain specialist as an outpatient  PDMP does show some short courses of opiate prescriptions, but last prescription back in December  CT AP negative for acute pathology, already treated for suspected pyelo  No further intervention   no
